# Patient Record
Sex: MALE | Race: BLACK OR AFRICAN AMERICAN | NOT HISPANIC OR LATINO | Employment: OTHER | RURAL
[De-identification: names, ages, dates, MRNs, and addresses within clinical notes are randomized per-mention and may not be internally consistent; named-entity substitution may affect disease eponyms.]

---

## 2020-10-28 ENCOUNTER — HISTORICAL (OUTPATIENT)
Dept: ADMINISTRATIVE | Facility: HOSPITAL | Age: 30
End: 2020-10-28

## 2020-10-28 LAB
25(OH)D3 SERPL-MCNC: 13.3 NG/ML
ALBUMIN SERPL BCP-MCNC: 3.3 G/DL (ref 3.5–5)
ALBUMIN/GLOB SERPL: 0.8 {RATIO}
ALP SERPL-CCNC: 102 U/L (ref 45–115)
ALT SERPL W P-5'-P-CCNC: 30 U/L (ref 16–61)
ANION GAP SERPL CALCULATED.3IONS-SCNC: 10.6 MMOL/L (ref 7–16)
AST SERPL W P-5'-P-CCNC: 24 U/L (ref 15–37)
BASOPHILS # BLD AUTO: 0.06 X10E3/UL (ref 0–0.2)
BASOPHILS NFR BLD AUTO: 1.2 % (ref 0–1)
BILIRUB SERPL-MCNC: 0.4 MG/DL (ref 0–1.2)
BUN SERPL-MCNC: 8 MG/DL (ref 7–18)
BUN/CREAT SERPL: 8
CALCIUM SERPL-MCNC: 8.9 MG/DL (ref 8.5–10.1)
CHLORIDE SERPL-SCNC: 107 MMOL/L (ref 98–107)
CHOLEST SERPL-MCNC: 145 MG/DL
CHOLEST/HDLC SERPL: 3.6 {RATIO}
CO2 SERPL-SCNC: 26 MMOL/L (ref 21–32)
CREAT SERPL-MCNC: 0.97 MG/DL (ref 0.7–1.3)
EOSINOPHIL # BLD AUTO: 0.11 X10E3/UL (ref 0–0.5)
EOSINOPHIL NFR BLD AUTO: 2.2 % (ref 1–4)
ERYTHROCYTE [DISTWIDTH] IN BLOOD BY AUTOMATED COUNT: 14.5 % (ref 11.5–14.5)
EST. AVERAGE GLUCOSE BLD GHB EST-MCNC: 130 MG/DL
GLOBULIN SER-MCNC: 4.3 G/DL (ref 2–4)
GLUCOSE SERPL-MCNC: 96 MG/DL (ref 74–106)
HBA1C MFR BLD HPLC: 6.5 %
HCT VFR BLD AUTO: 38.9 % (ref 40–54)
HDLC SERPL-MCNC: 40 MG/DL
HGB BLD-MCNC: 12.9 G/DL (ref 13.5–18)
IMM GRANULOCYTES # BLD AUTO: 0.03 X10E3/UL (ref 0–0.04)
IMM GRANULOCYTES NFR BLD: 0.6 % (ref 0–0.4)
LDLC SERPL CALC-MCNC: 89 MG/DL
LYMPHOCYTES # BLD AUTO: 1.3 X10E3/UL (ref 1–4.8)
LYMPHOCYTES NFR BLD AUTO: 25.4 % (ref 27–41)
MCH RBC QN AUTO: 32.6 PG (ref 27–31)
MCHC RBC AUTO-ENTMCNC: 33.2 G/DL (ref 32–36)
MCV RBC AUTO: 98.2 FL (ref 80–96)
MONOCYTES # BLD AUTO: 0.43 X10E3/UL (ref 0–0.8)
MONOCYTES NFR BLD AUTO: 8.4 % (ref 2–6)
MPC BLD CALC-MCNC: 11 FL (ref 9.4–12.4)
NEUTROPHILS # BLD AUTO: 3.18 X10E3/UL (ref 1.8–7.7)
NEUTROPHILS NFR BLD AUTO: 62.2 % (ref 53–65)
NRBC # BLD AUTO: 0 X10E3/UL (ref 0–0)
NRBC, AUTO (.00): 0 /100 (ref 0–0)
PLATELET # BLD AUTO: 338 X10E3/UL (ref 150–400)
POTASSIUM SERPL-SCNC: 3.6 MMOL/L (ref 3.5–5.1)
PROT SERPL-MCNC: 7.6 G/DL (ref 6.4–8.2)
RBC # BLD AUTO: 3.96 X10E6/UL (ref 4.6–6.2)
SODIUM SERPL-SCNC: 140 MMOL/L (ref 136–145)
TRIGL SERPL-MCNC: 82 MG/DL
TSH SERPL DL<=0.005 MIU/L-ACNC: 2.21 UIU/ML (ref 0.36–3.74)
WBC # BLD AUTO: 5.11 X10E3/UL (ref 4.5–11)

## 2023-11-09 ENCOUNTER — HOSPITAL ENCOUNTER (INPATIENT)
Facility: HOSPITAL | Age: 33
LOS: 83 days | Discharge: HOME HEALTH | DRG: 189 | End: 2024-01-31
Attending: INTERNAL MEDICINE | Admitting: INTERNAL MEDICINE
Payer: MEDICARE

## 2023-11-09 DIAGNOSIS — J96.02 ACUTE HYPERCAPNIC RESPIRATORY FAILURE: Primary | ICD-10-CM

## 2023-11-09 DIAGNOSIS — J96.90 RESPIRATORY FAILURE: ICD-10-CM

## 2023-11-09 LAB
ALBUMIN SERPL BCP-MCNC: 1.9 G/DL (ref 3.5–5)
ALBUMIN/GLOB SERPL: 0.4 {RATIO}
ALP SERPL-CCNC: 141 U/L (ref 45–115)
ALT SERPL W P-5'-P-CCNC: 20 U/L (ref 16–61)
ANION GAP SERPL CALCULATED.3IONS-SCNC: 12 MMOL/L (ref 7–16)
ANISOCYTOSIS BLD QL SMEAR: ABNORMAL
AST SERPL W P-5'-P-CCNC: 20 U/L (ref 15–37)
BASOPHILS # BLD AUTO: 0.04 K/UL (ref 0–0.2)
BASOPHILS NFR BLD AUTO: 0.4 % (ref 0–1)
BILIRUB SERPL-MCNC: 0.3 MG/DL (ref ?–1.2)
BILIRUB UR QL STRIP: NEGATIVE
BUN SERPL-MCNC: 12 MG/DL (ref 7–18)
BUN/CREAT SERPL: 19 (ref 6–20)
CALCIUM SERPL-MCNC: 9 MG/DL (ref 8.5–10.1)
CHLORIDE SERPL-SCNC: 97 MMOL/L (ref 98–107)
CLARITY UR: CLEAR
CO2 SERPL-SCNC: 27 MMOL/L (ref 21–32)
COLOR UR: YELLOW
CREAT SERPL-MCNC: 0.62 MG/DL (ref 0.7–1.3)
DIFFERENTIAL METHOD BLD: ABNORMAL
EGFR (NO RACE VARIABLE) (RUSH/TITUS): 131 ML/MIN/1.73M2
EOSINOPHIL # BLD AUTO: 0.25 K/UL (ref 0–0.5)
EOSINOPHIL NFR BLD AUTO: 2.3 % (ref 1–4)
EOSINOPHIL NFR BLD MANUAL: 2 % (ref 1–4)
ERYTHROCYTE [DISTWIDTH] IN BLOOD BY AUTOMATED COUNT: 16.5 % (ref 11.5–14.5)
GLOBULIN SER-MCNC: 5.2 G/DL (ref 2–4)
GLUCOSE SERPL-MCNC: 159 MG/DL (ref 74–106)
GLUCOSE UR STRIP-MCNC: NEGATIVE MG/DL
HCO3 UR-SCNC: 28.9 MMOL/L (ref 21–28)
HCT VFR BLD AUTO: 29.6 % (ref 40–54)
HGB BLD-MCNC: 8.7 G/DL (ref 13.5–18)
HYPOCHROMIA BLD QL SMEAR: ABNORMAL
KETONES UR STRIP-SCNC: NEGATIVE MG/DL
LEUKOCYTE ESTERASE UR QL STRIP: NEGATIVE
LEVETIRACETAM SERPL-MCNC: 16.7 ΜG/ML (ref 12–46)
LYMPHOCYTES # BLD AUTO: 1.36 K/UL (ref 1–4.8)
LYMPHOCYTES NFR BLD AUTO: 12.3 % (ref 27–41)
LYMPHOCYTES NFR BLD MANUAL: 19 % (ref 27–41)
MAGNESIUM SERPL-MCNC: 1.9 MG/DL (ref 1.7–2.3)
MCH RBC QN AUTO: 27.4 PG (ref 27–31)
MCHC RBC AUTO-ENTMCNC: 29.4 G/DL (ref 32–36)
MCV RBC AUTO: 93.1 FL (ref 80–96)
MONOCYTES # BLD AUTO: 0.74 K/UL (ref 0–0.8)
MONOCYTES NFR BLD AUTO: 6.7 % (ref 2–6)
MONOCYTES NFR BLD MANUAL: 9 % (ref 2–6)
MPC BLD CALC-MCNC: 9.9 FL (ref 9.4–12.4)
NEUTROPHILS # BLD AUTO: 8.64 K/UL (ref 1.8–7.7)
NEUTROPHILS NFR BLD AUTO: 78.3 % (ref 53–65)
NEUTS BAND NFR BLD MANUAL: 3 % (ref 1–5)
NEUTS SEG NFR BLD MANUAL: 67 % (ref 50–62)
NITRITE UR QL STRIP: NEGATIVE
NRBC BLD MANUAL-RTO: ABNORMAL %
PCO2 BLDA: 37 MMHG (ref 35–48)
PH SMN: 7.5 [PH] (ref 7.35–7.45)
PH UR STRIP: 6 PH UNITS
PLATELET # BLD AUTO: 614 K/UL (ref 150–400)
PO2 BLDA: 86 MMHG (ref 83–108)
POC BASE EXCESS: 5.5 MMOL/L (ref -2–3)
POC SATURATED O2: 97 %
POIKILOCYTOSIS BLD QL SMEAR: ABNORMAL
POTASSIUM SERPL-SCNC: 4 MMOL/L (ref 3.5–5.1)
PREALB SERPL NEPH-MCNC: 20 MG/DL (ref 20–40)
PROT SERPL-MCNC: 7.1 G/DL (ref 6.4–8.2)
PROT UR QL STRIP: NEGATIVE
RBC # BLD AUTO: 3.18 M/UL (ref 4.6–6.2)
RBC # UR STRIP: NEGATIVE /UL
SODIUM SERPL-SCNC: 132 MMOL/L (ref 136–145)
SP GR UR STRIP: 1.02
UROBILINOGEN UR STRIP-ACNC: 0.2 MG/DL
WBC # BLD AUTO: 11.03 K/UL (ref 4.5–11)

## 2023-11-09 PROCEDURE — 27100108

## 2023-11-09 PROCEDURE — 25000003 PHARM REV CODE 250: Performed by: REGISTERED NURSE

## 2023-11-09 PROCEDURE — 84134 ASSAY OF PREALBUMIN: CPT | Performed by: NURSE PRACTITIONER

## 2023-11-09 PROCEDURE — 81003 URINALYSIS AUTO W/O SCOPE: CPT | Performed by: NURSE PRACTITIONER

## 2023-11-09 PROCEDURE — 82803 BLOOD GASES ANY COMBINATION: CPT

## 2023-11-09 PROCEDURE — 99900035 HC TECH TIME PER 15 MIN (STAT)

## 2023-11-09 PROCEDURE — 11000004 HC SNF PRIVATE

## 2023-11-09 PROCEDURE — 63600175 PHARM REV CODE 636 W HCPCS: Performed by: REGISTERED NURSE

## 2023-11-09 PROCEDURE — 27000221 HC OXYGEN, UP TO 24 HOURS

## 2023-11-09 PROCEDURE — 87186 SC STD MICRODIL/AGAR DIL: CPT | Performed by: INTERNAL MEDICINE

## 2023-11-09 PROCEDURE — 80053 COMPREHEN METABOLIC PANEL: CPT | Performed by: NURSE PRACTITIONER

## 2023-11-09 PROCEDURE — 93010 ELECTROCARDIOGRAM REPORT: CPT | Mod: ,,, | Performed by: INTERNAL MEDICINE

## 2023-11-09 PROCEDURE — 80177 DRUG SCRN QUAN LEVETIRACETAM: CPT | Performed by: NURSE PRACTITIONER

## 2023-11-09 PROCEDURE — 99900031 HC PATIENT EDUCATION (STAT)

## 2023-11-09 PROCEDURE — 94761 N-INVAS EAR/PLS OXIMETRY MLT: CPT

## 2023-11-09 PROCEDURE — 94002 VENT MGMT INPAT INIT DAY: CPT

## 2023-11-09 PROCEDURE — 27200966 HC CLOSED SUCTION SYSTEM

## 2023-11-09 PROCEDURE — 85025 COMPLETE CBC W/AUTO DIFF WBC: CPT | Performed by: NURSE PRACTITIONER

## 2023-11-09 PROCEDURE — 99900026 HC AIRWAY MAINTENANCE (STAT)

## 2023-11-09 PROCEDURE — 93005 ELECTROCARDIOGRAM TRACING: CPT

## 2023-11-09 PROCEDURE — 83036 HEMOGLOBIN GLYCOSYLATED A1C: CPT | Performed by: REGISTERED NURSE

## 2023-11-09 PROCEDURE — 36600 WITHDRAWAL OF ARTERIAL BLOOD: CPT

## 2023-11-09 PROCEDURE — 83735 ASSAY OF MAGNESIUM: CPT | Performed by: NURSE PRACTITIONER

## 2023-11-09 RX ORDER — PANTOPRAZOLE SODIUM 40 MG/1
40 FOR SUSPENSION ORAL DAILY
Status: DISCONTINUED | OUTPATIENT
Start: 2023-11-10 | End: 2023-11-12

## 2023-11-09 RX ORDER — IPRATROPIUM BROMIDE AND ALBUTEROL SULFATE 2.5; .5 MG/3ML; MG/3ML
3 SOLUTION RESPIRATORY (INHALATION) EVERY 6 HOURS
Status: ON HOLD | COMMUNITY
End: 2024-01-30

## 2023-11-09 RX ORDER — METOPROLOL TARTRATE 25 MG/1
25 TABLET, FILM COATED ORAL 2 TIMES DAILY
Status: ON HOLD | COMMUNITY
End: 2024-01-30

## 2023-11-09 RX ORDER — ALBUTEROL SULFATE 0.83 MG/ML
2.5 SOLUTION RESPIRATORY (INHALATION) EVERY 6 HOURS PRN
Status: DISCONTINUED | OUTPATIENT
Start: 2023-11-09 | End: 2024-01-31 | Stop reason: HOSPADM

## 2023-11-09 RX ORDER — LEVETIRACETAM 100 MG/ML
500 SOLUTION ORAL 2 TIMES DAILY
Status: DISCONTINUED | OUTPATIENT
Start: 2023-11-09 | End: 2024-01-31 | Stop reason: HOSPADM

## 2023-11-09 RX ORDER — PANTOPRAZOLE SODIUM 40 MG/1
40 FOR SUSPENSION ORAL DAILY
COMMUNITY

## 2023-11-09 RX ORDER — LEVETIRACETAM 100 MG/ML
500 SOLUTION ORAL 2 TIMES DAILY
Status: ON HOLD | COMMUNITY
End: 2024-01-30

## 2023-11-09 RX ORDER — ALBUTEROL SULFATE 0.83 MG/ML
2.5 SOLUTION RESPIRATORY (INHALATION) EVERY 12 HOURS
Status: DISCONTINUED | OUTPATIENT
Start: 2023-11-10 | End: 2023-11-09

## 2023-11-09 RX ORDER — LORAZEPAM 2 MG/ML
1 INJECTION INTRAMUSCULAR DAILY PRN
Status: DISCONTINUED | OUTPATIENT
Start: 2023-11-09 | End: 2024-01-31 | Stop reason: HOSPADM

## 2023-11-09 RX ORDER — IPRATROPIUM BROMIDE AND ALBUTEROL SULFATE 2.5; .5 MG/3ML; MG/3ML
3 SOLUTION RESPIRATORY (INHALATION) EVERY 12 HOURS
Status: DISCONTINUED | OUTPATIENT
Start: 2023-11-10 | End: 2024-01-31 | Stop reason: HOSPADM

## 2023-11-09 RX ORDER — IPRATROPIUM BROMIDE AND ALBUTEROL SULFATE 2.5; .5 MG/3ML; MG/3ML
3 SOLUTION RESPIRATORY (INHALATION) EVERY 6 HOURS
Status: DISCONTINUED | OUTPATIENT
Start: 2023-11-10 | End: 2023-11-09

## 2023-11-09 RX ORDER — IPRATROPIUM BROMIDE 0.5 MG/2.5ML
0.5 SOLUTION RESPIRATORY (INHALATION) EVERY 12 HOURS
Status: DISCONTINUED | OUTPATIENT
Start: 2023-11-10 | End: 2023-11-09

## 2023-11-09 RX ORDER — METOPROLOL TARTRATE 25 MG/1
25 TABLET, FILM COATED ORAL 2 TIMES DAILY
Status: DISCONTINUED | OUTPATIENT
Start: 2023-11-09 | End: 2023-11-12

## 2023-11-09 RX ADMIN — LEVETIRACETAM 500 MG: 500 SOLUTION ORAL at 09:11

## 2023-11-09 RX ADMIN — LORAZEPAM 1 MG: 2 INJECTION INTRAMUSCULAR; INTRAVENOUS at 10:11

## 2023-11-09 RX ADMIN — METOPROLOL TARTRATE 25 MG: 25 TABLET, FILM COATED ORAL at 09:11

## 2023-11-09 RX ADMIN — APIXABAN 5 MG: 2.5 TABLET, FILM COATED ORAL at 09:11

## 2023-11-09 NOTE — RESPIRATORY THERAPY
Patient arrived at 13:20 and was put on the following settings: SIMV, , Rate of 8 FIO2 40%, PEEP of 5 and Pressure Support of 12

## 2023-11-09 NOTE — PLAN OF CARE
Problem: Adult Inpatient Plan of Care  Goal: Plan of Care Review  Outcome: Ongoing, Progressing  Goal: Patient-Specific Goal (Individualized)  Outcome: Ongoing, Progressing     Problem: Device-Related Complication Risk (Mechanical Ventilation, Invasive)  Goal: Optimal Device Function  Outcome: Ongoing, Progressing     Problem: Nutrition Impairment (Mechanical Ventilation, Invasive)  Goal: Optimal Nutrition Delivery  Outcome: Ongoing, Progressing     Problem: Skin and Tissue Injury (Mechanical Ventilation, Invasive)  Goal: Absence of Device-Related Skin and Tissue Injury  Outcome: Ongoing, Progressing     Problem: Device-Related Complication Risk (Artificial Airway)  Goal: Optimal Device Function  Outcome: Ongoing, Progressing     Problem: Skin and Tissue Injury (Artificial Airway)  Goal: Absence of Device-Related Skin or Tissue Injury  Outcome: Ongoing, Progressing     Problem: Fall Injury Risk  Goal: Absence of Fall and Fall-Related Injury  Outcome: Ongoing, Progressing     Problem: Skin Injury Risk Increased  Goal: Skin Health and Integrity  Outcome: Ongoing, Progressing     Problem: Impaired Wound Healing  Goal: Optimal Wound Healing  Outcome: Ongoing, Progressing

## 2023-11-10 PROBLEM — M62.81 MUSCLE WEAKNESS: Status: ACTIVE | Noted: 2023-11-10

## 2023-11-10 LAB
EST. AVERAGE GLUCOSE BLD GHB EST-MCNC: 137 MG/DL
GLUCOSE SERPL-MCNC: 144 MG/DL (ref 70–105)
HBA1C MFR BLD HPLC: 6.4 % (ref 4.5–6.6)

## 2023-11-10 PROCEDURE — 94003 VENT MGMT INPAT SUBQ DAY: CPT

## 2023-11-10 PROCEDURE — 25000242 PHARM REV CODE 250 ALT 637 W/ HCPCS: Performed by: INTERNAL MEDICINE

## 2023-11-10 PROCEDURE — 99305 1ST NF CARE MODERATE MDM 35: CPT | Mod: 95,AI,, | Performed by: INTERNAL MEDICINE

## 2023-11-10 PROCEDURE — 94761 N-INVAS EAR/PLS OXIMETRY MLT: CPT

## 2023-11-10 PROCEDURE — 25000003 PHARM REV CODE 250: Performed by: REGISTERED NURSE

## 2023-11-10 PROCEDURE — 27200966 HC CLOSED SUCTION SYSTEM

## 2023-11-10 PROCEDURE — 94640 AIRWAY INHALATION TREATMENT: CPT

## 2023-11-10 PROCEDURE — 99900035 HC TECH TIME PER 15 MIN (STAT)

## 2023-11-10 PROCEDURE — 97167 OT EVAL HIGH COMPLEX 60 MIN: CPT

## 2023-11-10 PROCEDURE — 82962 GLUCOSE BLOOD TEST: CPT

## 2023-11-10 PROCEDURE — 97162 PT EVAL MOD COMPLEX 30 MIN: CPT

## 2023-11-10 PROCEDURE — 27000221 HC OXYGEN, UP TO 24 HOURS

## 2023-11-10 PROCEDURE — 99900026 HC AIRWAY MAINTENANCE (STAT)

## 2023-11-10 PROCEDURE — 25000242 PHARM REV CODE 250 ALT 637 W/ HCPCS: Performed by: REGISTERED NURSE

## 2023-11-10 PROCEDURE — 11000004 HC SNF PRIVATE

## 2023-11-10 RX ADMIN — METOPROLOL TARTRATE 25 MG: 25 TABLET, FILM COATED ORAL at 09:11

## 2023-11-10 RX ADMIN — IPRATROPIUM BROMIDE AND ALBUTEROL SULFATE 3 ML: 2.5; .5 SOLUTION RESPIRATORY (INHALATION) at 07:11

## 2023-11-10 RX ADMIN — APIXABAN 5 MG: 2.5 TABLET, FILM COATED ORAL at 08:11

## 2023-11-10 RX ADMIN — LEVETIRACETAM 500 MG: 500 SOLUTION ORAL at 09:11

## 2023-11-10 RX ADMIN — LEVETIRACETAM 500 MG: 500 SOLUTION ORAL at 08:11

## 2023-11-10 RX ADMIN — PANTOPRAZOLE SODIUM 40 MG: 40 GRANULE, DELAYED RELEASE ORAL at 09:11

## 2023-11-10 RX ADMIN — METOPROLOL TARTRATE 25 MG: 25 TABLET, FILM COATED ORAL at 08:11

## 2023-11-10 RX ADMIN — APIXABAN 5 MG: 2.5 TABLET, FILM COATED ORAL at 09:11

## 2023-11-10 NOTE — SUBJECTIVE & OBJECTIVE
Past Medical History:   Diagnosis Date    Diabetes mellitus     Digestive disorder     Seizures     Sleep apnea, unspecified        History reviewed. No pertinent surgical history.    Review of patient's allergies indicates:  Not on File    No current facility-administered medications on file prior to encounter.     Current Outpatient Medications on File Prior to Encounter   Medication Sig    albuterol-ipratropium (DUO-NEB) 2.5 mg-0.5 mg/3 mL nebulizer solution Take 3 mLs by nebulization every 6 (six) hours. Rescue    apixaban (ELIQUIS) 5 mg Tab 5 mg by Per G Tube route 2 (two) times daily.    levETIRAcetam (KEPPRA) 100 mg/mL Soln 500 mg by Per G Tube route 2 (two) times daily.    metoprolol tartrate (LOPRESSOR) 25 MG tablet 25 mg by Per G Tube route 2 (two) times daily.    pantoprazole (PROTONIX) 40 mg suspension 40 mg by Per G Tube route once daily.     Family History       Problem Relation (Age of Onset)    ADD / ADHD Sister    Hypertension Mother          Tobacco Use    Smoking status: Never    Smokeless tobacco: Never   Substance and Sexual Activity    Alcohol use: Never    Drug use: Never    Sexual activity: Never     Review of Systems   Unable to perform ROS: Acuity of condition     Objective:     Vital Signs (Most Recent):  Temp: (!) 100.9 °F (38.3 °C) (11/10/23 0737)  Pulse: (!) 113 (11/10/23 0745)  Resp: (!) 31 (11/10/23 0745)  BP: (!) 155/70 (11/10/23 0737)  SpO2: 100 % (11/10/23 0745) Vital Signs (24h Range):  Temp:  [98.7 °F (37.1 °C)-100.9 °F (38.3 °C)] 100.9 °F (38.3 °C)  Pulse:  [] 113  Resp:  [17-45] 31  SpO2:  [91 %-100 %] 100 %  BP: (100-155)/(52-70) 155/70     Weight: 89.5 kg (197 lb 4.8 oz)  Body mass index is 39.85 kg/m².     Physical Exam  Vitals reviewed.   Constitutional:       Appearance: Normal appearance.      Interventions: He is not intubated.  HENT:      Head: Normocephalic and atraumatic.      Nose: Nose normal.      Mouth/Throat:      Mouth: Mucous membranes are dry.       Pharynx: Oropharynx is clear.   Eyes:      Extraocular Movements: Extraocular movements intact.      Conjunctiva/sclera: Conjunctivae normal.      Pupils: Pupils are equal, round, and reactive to light.   Cardiovascular:      Rate and Rhythm: Normal rate.      Heart sounds: Normal heart sounds. No murmur heard.  Pulmonary:      Effort: Pulmonary effort is normal. He is not intubated.      Breath sounds: Normal breath sounds.   Abdominal:      General: Abdomen is flat. Bowel sounds are normal.      Palpations: Abdomen is soft.   Musculoskeletal:         General: Normal range of motion.      Cervical back: Normal range of motion and neck supple.      Right lower leg: No edema.      Left lower leg: No edema.   Skin:     General: Skin is warm and dry.      Capillary Refill: Capillary refill takes less than 2 seconds.   Neurological:      General: No focal deficit present.      Mental Status: He is alert and oriented to person, place, and time.   Psychiatric:         Mood and Affect: Mood normal.         Behavior: Behavior normal.              CRANIAL NERVES     CN III, IV, VI   Pupils are equal, round, and reactive to light.       Significant Labs: All pertinent labs within the past 24 hours have been reviewed.  Recent Lab Results         11/10/23  0524   11/09/23  1811   11/09/23  1700   11/09/23  1513        Albumin/Globulin Ratio     0.4         Albumin     1.9         ALP     141         ALT     20         Anion Gap     12         Aniso     1+         Appearance, UA   Clear           AST     20         Bands     3         Baso #     0.04         Basophil %     0.4         Bilirubin (UA)   Negative           BILIRUBIN TOTAL     0.3         BUN     12         BUN/CREAT RATIO     19         Calcium     9.0         Chloride     97         CO2     27         Color, UA   Yellow           Creatinine     0.62         Differential Method     Manual         eGFR     131         Eos #     0.25         Eosinophil %     2.3               2         Globulin, Total     5.2         Glucose     159         Glucose, UA   Negative           Hematocrit     29.6         Hemoglobin     8.7         Hypo     Few         Ketones, UA   Negative           Leukocytes, UA   Negative           Levetiracetam Lvl     16.7         Lymph #     1.36         Lymph %     12.3              19         Magnesium      1.9         MCH     27.4         MCHC     29.4         MCV     93.1         Mono #     0.74         Mono %     6.7              9         MPV     9.9         Neutrophils, Abs     8.64         Neutrophils Relative     78.3         NITRITE UA   Negative           nRBC             Occult Blood UA   Negative           pH, UA   6.0           Platelet Count     614         POC Base Excess       5.5       POC Glucose 144             POC HCO3       28.9       POC PCO2       37       POC PH       7.50       POC PO2       86       POC SATURATED O2       97       Poikilocytosis     1+         Potassium     4.0         Prealbumin     20         PROTEIN TOTAL     7.1         Protein, UA   Negative           RBC     3.18         RDW     16.5         Segmented Neutrophils, Man %     67         Sodium     132         Specific Gravity, UA   1.020           UROBILINOGEN UA   0.2           WBC     11.03                 Significant Imaging: I have reviewed all pertinent imaging results/findings within the past 24 hours.

## 2023-11-10 NOTE — RESPIRATORY THERAPY
10:11   Put patient on AVAPS heart rate and respirations were a little elevated.  Heart rate was 101 and Respirations were 24.  Patient was resting comfortably.      11:55  Took patient off of AVAPS and put him back on SIMV, Respirations were up toward the end, but SAT was 96%. After a few minutes on SIMV patients respirations went down into the 20's    14:06 Put patient on AVAPS, vital signs were good    15:12  Took patient off of AVAPS and put patient back on SIMV.  Patient had no problems during this trial.

## 2023-11-10 NOTE — PLAN OF CARE
Problem: Communication Impairment (Mechanical Ventilation, Invasive)  Goal: Effective Communication  Outcome: Ongoing, Progressing     Problem: Device-Related Complication Risk (Mechanical Ventilation, Invasive)  Goal: Optimal Device Function  Outcome: Ongoing, Progressing     Problem: Inability to Wean (Mechanical Ventilation, Invasive)  Goal: Mechanical Ventilation Liberation  Outcome: Ongoing, Progressing     Problem: Nutrition Impairment (Mechanical Ventilation, Invasive)  Goal: Optimal Nutrition Delivery  Outcome: Ongoing, Progressing     Problem: Skin and Tissue Injury (Mechanical Ventilation, Invasive)  Goal: Absence of Device-Related Skin and Tissue Injury  Outcome: Ongoing, Progressing     Problem: Ventilator-Induced Lung Injury (Mechanical Ventilation, Invasive)  Goal: Absence of Ventilator-Induced Lung Injury  Outcome: Ongoing, Progressing     Problem: Communication Impairment (Artificial Airway)  Goal: Effective Communication  Outcome: Ongoing, Progressing     Problem: Device-Related Complication Risk (Artificial Airway)  Goal: Optimal Device Function  Outcome: Ongoing, Progressing     Problem: Skin and Tissue Injury (Artificial Airway)  Goal: Absence of Device-Related Skin or Tissue Injury  Outcome: Ongoing, Progressing     Problem: Noninvasive Ventilation Acute  Goal: Effective Unassisted Ventilation and Oxygenation  Outcome: Ongoing, Progressing

## 2023-11-10 NOTE — PLAN OF CARE
Problem: Occupational Therapy  Goal: Occupational Therapy Goal  Description: ST. Pt will visually track from L<>R sides   2. Patient will follow one step command for Active Range of Motion of extremities to allow active participation in therapeutic intervention.       LTG  to be established if patient is able to participate in therapy.   Outcome: Ongoing, Progressing

## 2023-11-10 NOTE — PLAN OF CARE
[facility] - Medical Surgical Unit  Initial Discharge Assessment       Primary Care Provider: Nati Doyle FNP    Admission Diagnosis: Respiratory failure [J96.90]    Admission Date: 11/9/2023  Expected Discharge Date:     Transition of Care Barriers: None    Payor: MEDICARE / Plan: MEDICARE PART A & B / Product Type: Government /     Extended Emergency Contact Information  Primary Emergency Contact: Ramon Ramirez  Mobile Phone: [phone]  Relation: Mother   needed? No    Discharge Plan A: Home with family, Home Health       No Pharmacies Listed    Initial Assessment (most recent)       Adult Discharge Assessment - 11/10/23 1000          Discharge Assessment    Assessment Type Discharge Planning Assessment     Confirmed/corrected address, phone number and insurance Yes     Confirmed Demographics Correct on Facesheet     Source of Information family     If unable to respond/provide information was family/caregiver contacted? Yes     Contact Name/Number Ramon Ramirez mother     Communicated CRICKET with patient/caregiver Date not available/Unable to determine     Reason For Admission vent weaning and decannulation     People in Home parent(s)     Facility Arrived From: ramon ramirez mother and step dad     Do you expect to return to your current living situation? Yes     Do you have help at home or someone to help you manage your care at home? Yes     Who are your caregiver(s) and their phone number(s)? mother and step father and sister and surrounding family     Current cognitive status: Unable to Assess     Do you have any problems with: Needs other help     Specify other help pt is disable and mother provides all necessities     Home Accessibility stairs to enter home;not wheelchair accessible     Number of Stairs, Main Entrance five     Surface of Stairs, Main Entrance carpeting     Landing, Stairs, Main Entrance adequate turning radius     Home Layout Able to live on 1st floor      Equipment Currently Used at Home nebulizer;CPAP     Readmission within 30 days? No     Patient currently being followed by outpatient case management? No     Do you currently have service(s) that help you manage your care at home? No     Do you take prescription medications? Yes     Do you have prescription coverage? Yes     Coverage medicaid     Do you have any problems affording any of your prescribed medications? No     Is the patient taking medications as prescribed? yes     Who is going to help you get home at discharge? mother Linn Valdez     How do you get to doctors appointments? family or friend will provide     Are you on dialysis? No     Do you take coumadin? No     DME Needed Upon Discharge  hospital bed     Discharge Plan discussed with: Caregiver     Name(s) and Number(s) Linn Valdez [phone]     Transition of Care Barriers None     Discharge Plan A Home with family;Home Health        Physical Activity    On average, how many days per week do you engage in moderate to strenuous exercise (like a brisk walk)? 0 days     On average, how many minutes do you engage in exercise at this level? 0 min        Financial Resource Strain    How hard is it for you to pay for the very basics like food, housing, medical care, and heating? Not hard at all        Housing Stability    In the last 12 months, was there a time when you were not able to pay the mortgage or rent on time? No     In the last 12 months, how many places have you lived? 1     In the last 12 months, was there a time when you did not have a steady place to sleep or slept in a shelter (including now)? No        Transportation Needs    In the past 12 months, has lack of transportation kept you from medical appointments or from getting medications? No     In the past 12 months, has lack of transportation kept you from meetings, work, or from getting things needed for daily living? No        Food Insecurity    Within the past 12 months, you  worried that your food would run out before you got the money to buy more. Never true     Within the past 12 months, the food you bought just didn't last and you didn't have money to get more. Never true        Stress    Do you feel stress - tense, restless, nervous, or anxious, or unable to sleep at night because your mind is troubled all the time - these days? Not at all        Social Connections    In a typical week, how many times do you talk on the phone with family, friends, or neighbors? More than three times a week     How often do you get together with friends or relatives? Twice a week     How often do you attend Yarsanism or Moravian services? More than 4 times per year     Do you belong to any clubs or organizations such as Yarsanism groups, unions, fraternal or athletic groups, or school groups? No     How often do you attend meetings of the clubs or organizations you belong to? Never     Are you , , , , never , or living with a partner? Never         Alcohol Use    Q1: How often do you have a drink containing alcohol? Never     Q2: How many drinks containing alcohol do you have on a typical day when you are drinking? Patient does not drink     Q3: How often do you have six or more drinks on one occasion? Never        OTHER    Name(s) of People in Home Ena Valdez and Mr Valdez                    ADMITTED TO [facility] VENT WEANING AND DECANNULATION , MOTHER PRESENT , PT LIVES WITH MOTHER ENA AND HIS STEP FATHER , WAS ABLE TO BATH SELF AND SERVE HIMSELF , MOTHER STILL HAD TO OBSERVE HIS CARE . DISCHARGE PLAN IS HOME WITH MOTHER AND WILL NEED DME AT DISCHARGE AND HOME HEALTH OF CHOICE.

## 2023-11-10 NOTE — ASSESSMENT & PLAN NOTE
Patient with Hypercapnic and Hypoxic Respiratory failure which is Chronic.  he is not on home oxygen. Supplemental oxygen was provided and noted- Vent Mode: SIMV  Oxygen Concentration (%):  [40] 40  Resp Rate Total:  [15 br/min-45 br/min] 27 br/min  Vt Set:  [450 mL] 450 mL  PEEP/CPAP:  [5 cmH20] 5 cmH20  Pressure Support:  [12 cmH20] 12 cmH20  Mean Airway Pressure:  [10.6 cmH20] 10.6 cmH20    .   Signs/symptoms of respiratory failure include- tachypnea, increased work of breathing, respiratory distress, use of accessory muscles, wheezing and stridor. Contributing diagnoses includes - Aspiration Labs and images were reviewed. Patient Has not had a recent ABG. Will treat underlying causes and adjust management of respiratory failure as follows- Vent weaning per protocol

## 2023-11-10 NOTE — PT/OT/SLP EVAL
Physical Therapy Evaluation    Patient Name:  Blue Abdul   MRN:  [id number]    Recommendations:     Discharge Recommendations: Low Intensity Therapy   Discharge Equipment Recommendations: hospital bed, lift device   Barriers to discharge: Inaccessible home and respiratory instability with vent dependence    Assessment:     Blue Abdul is a 33 y.o. male admitted with a medical diagnosis of Acute hypercapnic respiratory failure.  He presents with the following impairments/functional limitations: weakness, impaired self care skills, impaired functional mobility, impaired cognition, impaired cardiopulmonary response to activity Patient did not follow any commands throughout eval.  During gentle Active Range of Motion of Ue's and LE's his respiratory rate increased to 40 breaths per minute, his heartrate increased to 130 BPM, and his O2 sats dropped to 72%.  He was not appropriate to attempt any bed mobility or transfers.  I recommend one week trial to determine if he is able to actively participate in therapy safely. If no improvement will discharge to a ROM program for caregiver to perform.  His mother reported she was educated on a UE and LE ROM program at a previous hospital and she has been performing that but he does have some negative cardiovascular reactions when receiving Passive range of motion per her report.      Rehab Prognosis: Poor; patient would benefit from acute skilled PT services to address these deficits and reach maximum level of function.    Recent Surgery: * No surgery found *      Plan:     During this hospitalization, patient to be seen 5 x/week to address the identified rehab impairments via therapeutic exercises, therapeutic activities and progress toward the following goals:    Plan of Care Expires:  11/17/23    Subjective     Chief Complaint: vent dependence and lack of active following of commands  Patient/Family Comments/goals: return home with mother    Pain/Comfort:  Pain Rating 1:  (unresponsive)    Patients cultural, spiritual, Christian conflicts given the current situation: no    Living Environment:  Patient lived at home with mother prior to illness and hospitalization.  He was independent with mobility and used 3-4 words to communicate per his mother's report.    Prior to admission, patients level of function was independent with supervision due to Down Syndrome diagnosis.  Equipment used at home: none.  DME owned (not currently used): none.  Upon discharge, patient will have assistance from mother.    Objective:     Communicated with nurse prior to session.  Patient found supine with blood pressure cuff, rodriguez catheter, PEG Tube, peripheral IV, pulse ox (continuous), telemetry, ventilator, Tracheostomy  upon PT entry to room.    General Precautions: Standard,    Orthopedic Precautions:    Braces: N/A  Respiratory Status: Ventilator    Exams:  RLE ROM: WFL  RLE Strength: unable to follow any commands and did not demonstrate any Active Range of Motion in either of his LE's   LLE ROM: WFL  LLE Strength: unable to follow any commands and did not demonstrate any Active Range of Motion in either of his LE's    Functional Mobility:  Patient with very high heart rate and high respiration rate with decreasing O2 sats while on the ventilator during Passive range of motion and not stable enough to attempt any bed mobility or transfers.      AM-PAC 6 CLICK MOBILITY  Total Score:6       Treatment & Education:  Eval only.  Educated mother on PT Plan of care.     Patient left supine with call button in reach and mother present.    GOALS:   Multidisciplinary Problems       Physical Therapy Goals          Problem: Physical Therapy    Goal Priority Disciplines Outcome Goal Variances Interventions   Physical Therapy Goal     PT, PT/OT Ongoing, Not Progressing     Description: ST. Patient will follow one step command for Active Range of Motion of extremities to allow  active participation in therapeutic intervention.       LTG- to be established if patient is able to participate in therapy.                        History:     Past Medical History:   Diagnosis Date    Diabetes mellitus     Digestive disorder     Seizures     Sleep apnea, unspecified        History reviewed. No pertinent surgical history.    Time Tracking:     PT Received On: 11/10/23  PT Start Time: 0930     PT Stop Time: 0950  PT Total Time (min): 20 min     Billable Minutes: Evaluation 20      11/10/2023

## 2023-11-10 NOTE — PLAN OF CARE
Problem: Occupational Therapy  Goal: Occupational Therapy Goal  Description: ST. Pt will visually track from L<>R sides   2. Patient will follow one step command for Active Range of Motion of extremities to allow active participation in therapeutic intervention.       LTG  to be established if patient is able to participate in therapy.   11/10/2023 1305 by Alcides Jerez, OT  Outcome: Ongoing, Progressing  11/10/2023 1008 by Alcides Jerez, OT  Outcome: Ongoing, Progressing

## 2023-11-10 NOTE — CONSULTS
"Pt admitted to chronic vent unit with hx of hypoglycemic episode, aspiration. Resp failure with trach, cardiac arrest.  PMHx: DM, asthma, GERD, LILLIE, PEG, Seizure disorder.  Meds: Protonix, SS Insulin.  Ht: 59" Wt: 197# IBW: 100# %IBW: 197%, Adjusted BW: 121# EEN: 1577-7180 kcal, 55-66 g PRO, 1800-1980ml fluid.  Current TF: Glucerna 1.5@40ml per hour with 125ml water flush every 3 hours=1440 kcal, 79g PRO, 1728ml free water, 1960ml total fluids.  Pt has multiple HR areas to his skin.  BSS 10 noted.  Labs: Na 132, Cl 97, Alb 1.9, Hgb 8.7, Hct 29.6.  Last BM 11/10.  B-159.  Rec 1. Change flush to: 45ml water every hour.  This will provide: 2040ml free water, 1808ml free water.  F/U for further assessment upon return to facility.   "

## 2023-11-10 NOTE — PROGRESS NOTES
[facility] - [facility] Surgical Unit  Hospital Medicine  Progress Note    Patient Name: Blue Abdul  MRN: [id number]  Patient Class: IP- Swing   Admission Date: 11/9/2023  Length of Stay: 0 days  Attending Physician: Ramiro Flores MD  Primary Care Provider: Nati Doyle FNP        Subjective:     Principal Problem:<principal problem not specified>        HPI:  33 y-old AAM with PMH of Trisomy 21, asthma, DM, GERD, and LILLIE. Patient presented to an outside hospital system on 09/08/2023 for behavioral issues r/t medication changes. While at the hospital he experienced a hypoglycemic episode with aspiration and was coded. Patient developed new onset seizure at that time with MRI showing cerebral edema. Patient experienced difficulty with vent weaning hattie trach was placed on 09/27/2023, PEG tube placed on 10/05/2023. Patient is admitted to [facility] for continued vent weaning, PT/OT eval and treatment.       Overview/Hospital Course:  No notes on file    Past Medical History:   Diagnosis Date    Diabetes mellitus     Digestive disorder     Seizures     Sleep apnea, unspecified        History reviewed. No pertinent surgical history.    Review of patient's allergies indicates:  Not on File    No current facility-administered medications on file prior to encounter.     Current Outpatient Medications on File Prior to Encounter   Medication Sig    albuterol-ipratropium (DUO-NEB) 2.5 mg-0.5 mg/3 mL nebulizer solution Take 3 mLs by nebulization every 6 (six) hours. Rescue    apixaban (ELIQUIS) 5 mg Tab 5 mg by Per G Tube route 2 (two) times daily.    levETIRAcetam (KEPPRA) 100 mg/mL Soln 500 mg by Per G Tube route 2 (two) times daily.    metoprolol tartrate (LOPRESSOR) 25 MG tablet 25 mg by Per G Tube route 2 (two) times daily.    pantoprazole (PROTONIX) 40 mg suspension 40 mg by Per G Tube route once daily.     Family History       Problem Relation (Age of Onset)    ADD / ADHD  Sister    Hypertension Mother          Tobacco Use    Smoking status: Never    Smokeless tobacco: Never   Substance and Sexual Activity    Alcohol use: Never    Drug use: Never    Sexual activity: Never     Review of Systems   Constitutional:  Positive for activity change and fatigue.   HENT: Negative.     Eyes: Negative.    Respiratory:  Positive for cough.    Cardiovascular: Negative.    Gastrointestinal: Negative.    Endocrine: Negative.    Genitourinary: Negative.    Musculoskeletal: Negative.    Skin: Negative.    Allergic/Immunologic: Negative.    Neurological: Negative.    Hematological: Negative.    Psychiatric/Behavioral: Negative.       Objective:     Vital Signs (Most Recent):  Temp: 98.8 °F (37.1 °C) (11/09/23 1930)  Pulse: 88 (11/09/23 1930)  Resp: (!) 27 (11/09/23 1930)  BP: 123/69 (11/09/23 1930)  SpO2: 98 % (11/09/23 1930) Vital Signs (24h Range):  Temp:  [98.8 °F (37.1 °C)-99.6 °F (37.6 °C)] 98.8 °F (37.1 °C)  Pulse:  [] 88  Resp:  [27-45] 27  SpO2:  [94 %-99 %] 98 %  BP: (115-123)/(54-69) 123/69     Weight: 84.8 kg (187 lb)  Body mass index is 37.77 kg/m².     Physical Exam  Vitals and nursing note reviewed. Exam conducted with a chaperone present.   Constitutional:       Appearance: He is ill-appearing.   HENT:      Head: Normocephalic.      Right Ear: Ear canal and external ear normal.      Left Ear: Ear canal and external ear normal.      Nose: Nose normal.      Mouth/Throat:      Mouth: Mucous membranes are moist.      Pharynx: Oropharynx is clear.   Eyes:      Conjunctiva/sclera: Conjunctivae normal.   Cardiovascular:      Rate and Rhythm: Normal rate and regular rhythm.      Pulses: Normal pulses.      Heart sounds: Normal heart sounds.   Pulmonary:      Effort: Pulmonary effort is normal.      Breath sounds: Normal breath sounds.      Comments: Mechanically ventilated SIMV  Abdominal:      General: Bowel sounds are normal.   Skin:     General: Skin is warm and dry.      Capillary  Refill: Capillary refill takes 2 to 3 seconds.   Neurological:      Mental Status: Mental status is at baseline.      Comments: Patient does not follow commands and does not acknowledge the NP on initial exam.                 Significant Labs: All pertinent labs within the past 24 hours have been reviewed.  CBC:   Recent Labs   Lab 11/09/23  1700   WBC 11.03*   HGB 8.7*   HCT 29.6*   *     CMP:   Recent Labs   Lab 11/09/23  1700   *   K 4.0   CL 97*   CO2 27   *   BUN 12   CREATININE 0.62*   CALCIUM 9.0   PROT 7.1   ALBUMIN 1.9*   BILITOT 0.3   ALKPHOS 141*   AST 20   ALT 20   ANIONGAP 12     Urine Studies:   Recent Labs   Lab 11/09/23  1811   COLORU Yellow   APPEARANCEUA Clear   PHUR 6.0   SPECGRAV 1.020   PROTEINUA Negative   GLUCUA Negative   KETONESU Negative   BILIRUBINUA Negative   OCCULTUA Negative   NITRITE Negative   UROBILINOGEN 0.2   LEUKOCYTESUR Negative       Significant Imaging: I have reviewed all pertinent imaging results/findings within the past 24 hours.      Assessment/Plan:      Acute hypercapnic respiratory failure  Patient with Hypercapnic and Hypoxic Respiratory failure which is Chronic.  he is not on home oxygen. Supplemental oxygen was provided and noted- Vent Mode: SIMV  Oxygen Concentration (%):  [40] 40  Resp Rate Total:  [15 br/min-45 br/min] 27 br/min  Vt Set:  [450 mL] 450 mL  PEEP/CPAP:  [5 cmH20] 5 cmH20  Pressure Support:  [12 cmH20] 12 cmH20  Mean Airway Pressure:  [10.6 cmH20] 10.6 cmH20    .   Signs/symptoms of respiratory failure include- tachypnea, increased work of breathing, respiratory distress, use of accessory muscles, wheezing and stridor. Contributing diagnoses includes - Aspiration Labs and images were reviewed. Patient Has not had a recent ABG. Will treat underlying causes and adjust management of respiratory failure as follows- Vent weaning per protocol    Seizures  MRI showed encephalopathy  Keppra  Ativan PRN      Diabetes mellitus  Patient's  "FSGs are uncontrolled due to hyperglycemia on current medication regimen.  Last A1c reviewed-   Lab Results   Component Value Date    HGBA1C 6.5 10/28/2020     Most recent fingerstick glucose reviewed- No results for input(s): "POCTGLUCOSE" in the last 24 hours.  Current correctional scale  Low  Maintain anti-hyperglycemic dose as follows-   Antihyperglycemics (From admission, onward)    None        Hold Oral hypoglycemics while patient is in the hospital.    VTE Risk Mitigation (From admission, onward)         Ordered     apixaban tablet 5 mg  2 times daily         11/09/23 2008     IP VTE HIGH RISK PATIENT  Once         11/09/23 1641     Place NORMA hose  Until discontinued         11/09/23 1641     Place sequential compression device  Until discontinued         11/09/23 1641                Discharge Planning   CRICKET:      Code Status: Full Code   Is the patient medically ready for discharge?:     Reason for patient still in hospital (select all that apply): Patient unstable, Treatment and PT / OT recommendations                     Jose De Jesus Saravia NP-C  Department of Hospital Medicine   [facility]  "

## 2023-11-10 NOTE — H&P
[facility]  Hospital Medicine  History & Physical    Patient Name: Blue Abdul  MRN: [id number]  Patient Class: IP- Swing  Admission Date: 11/9/2023  Attending Physician: Ramiro Flores MD   Primary Care Provider: Nati Doyle FNP         Patient information was obtained from parent and past medical records.     Subjective:     Principal Problem:Acute hypercapnic respiratory failure    Chief Complaint: No chief complaint on file.       HPI: 33 y-old AAM with PMH of Trisomy 21, asthma, DM, GERD, and LILLIE. Patient presented to an outside hospital system on 09/08/2023 for behavioral issues r/t medication changes. While at the hospital he experienced a hypoglycemic episode with aspiration and was coded. Patient developed new onset seizure at that time with MRI showing cerebral edema. Patient experienced difficulty with vent weaning hattie trach was placed on 09/27/2023, PEG tube placed on 10/05/2023. Patient is admitted to [facility] for continued vent weaning, PT/OT eval and treatment.       Past Medical History:   Diagnosis Date    Diabetes mellitus     Digestive disorder     Seizures     Sleep apnea, unspecified        History reviewed. No pertinent surgical history.    Review of patient's allergies indicates:  Not on File    No current facility-administered medications on file prior to encounter.     Current Outpatient Medications on File Prior to Encounter   Medication Sig    albuterol-ipratropium (DUO-NEB) 2.5 mg-0.5 mg/3 mL nebulizer solution Take 3 mLs by nebulization every 6 (six) hours. Rescue    apixaban (ELIQUIS) 5 mg Tab 5 mg by Per G Tube route 2 (two) times daily.    levETIRAcetam (KEPPRA) 100 mg/mL Soln 500 mg by Per G Tube route 2 (two) times daily.    metoprolol tartrate (LOPRESSOR) 25 MG tablet 25 mg by Per G Tube route 2 (two) times daily.    pantoprazole (PROTONIX) 40 mg suspension 40 mg by Per G Tube route once daily.     Family  History       Problem Relation (Age of Onset)    ADD / ADHD Sister    Hypertension Mother          Tobacco Use    Smoking status: Never    Smokeless tobacco: Never   Substance and Sexual Activity    Alcohol use: Never    Drug use: Never    Sexual activity: Never     Review of Systems   Unable to perform ROS: Acuity of condition     Objective:     Vital Signs (Most Recent):  Temp: (!) 100.9 °F (38.3 °C) (11/10/23 0737)  Pulse: (!) 113 (11/10/23 0745)  Resp: (!) 31 (11/10/23 0745)  BP: (!) 155/70 (11/10/23 0737)  SpO2: 100 % (11/10/23 0745) Vital Signs (24h Range):  Temp:  [98.7 °F (37.1 °C)-100.9 °F (38.3 °C)] 100.9 °F (38.3 °C)  Pulse:  [] 113  Resp:  [17-45] 31  SpO2:  [91 %-100 %] 100 %  BP: (100-155)/(52-70) 155/70     Weight: 89.5 kg (197 lb 4.8 oz)  Body mass index is 39.85 kg/m².     Physical Exam  Vitals reviewed.   Constitutional:       Appearance: Normal appearance.      Interventions: He is not intubated.  HENT:      Head: Normocephalic and atraumatic.      Nose: Nose normal.      Mouth/Throat:      Mouth: Mucous membranes are dry.      Pharynx: Oropharynx is clear.   Eyes:      Extraocular Movements: Extraocular movements intact.      Conjunctiva/sclera: Conjunctivae normal.      Pupils: Pupils are equal, round, and reactive to light.   Cardiovascular:      Rate and Rhythm: Normal rate.      Heart sounds: Normal heart sounds. No murmur heard.  Pulmonary:      Effort: Pulmonary effort is normal. He is not intubated.      Breath sounds: Normal breath sounds.   Abdominal:      General: Abdomen is flat. Bowel sounds are normal.      Palpations: Abdomen is soft.   Musculoskeletal:         General: Normal range of motion.      Cervical back: Normal range of motion and neck supple.      Right lower leg: No edema.      Left lower leg: No edema.   Skin:     General: Skin is warm and dry.      Capillary Refill: Capillary refill takes less than 2 seconds.   Neurological:      General: No focal deficit  present.      Mental Status: He is alert and oriented to person, place, and time.   Psychiatric:         Mood and Affect: Mood normal.         Behavior: Behavior normal.              CRANIAL NERVES     CN III, IV, VI   Pupils are equal, round, and reactive to light.       Significant Labs: All pertinent labs within the past 24 hours have been reviewed.  Recent Lab Results         11/10/23  0524   11/09/23  1811   11/09/23  1700   11/09/23  1513        Albumin/Globulin Ratio     0.4         Albumin     1.9         ALP     141         ALT     20         Anion Gap     12         Aniso     1+         Appearance, UA   Clear           AST     20         Bands     3         Baso #     0.04         Basophil %     0.4         Bilirubin (UA)   Negative           BILIRUBIN TOTAL     0.3         BUN     12         BUN/CREAT RATIO     19         Calcium     9.0         Chloride     97         CO2     27         Color, UA   Yellow           Creatinine     0.62         Differential Method     Manual         eGFR     131         Eos #     0.25         Eosinophil %     2.3              2         Globulin, Total     5.2         Glucose     159         Glucose, UA   Negative           Hematocrit     29.6         Hemoglobin     8.7         Hypo     Few         Ketones, UA   Negative           Leukocytes, UA   Negative           Levetiracetam Lvl     16.7         Lymph #     1.36         Lymph %     12.3              19         Magnesium      1.9         MCH     27.4         MCHC     29.4         MCV     93.1         Mono #     0.74         Mono %     6.7              9         MPV     9.9         Neutrophils, Abs     8.64         Neutrophils Relative     78.3         NITRITE UA   Negative           nRBC             Occult Blood UA   Negative           pH, UA   6.0           Platelet Count     614         POC Base Excess       5.5       POC Glucose 144             POC HCO3       28.9       POC PCO2       37       POC PH       7.50        "POC PO2       86       POC SATURATED O2       97       Poikilocytosis     1+         Potassium     4.0         Prealbumin     20         PROTEIN TOTAL     7.1         Protein, UA   Negative           RBC     3.18         RDW     16.5         Segmented Neutrophils, Man %     67         Sodium     132         Specific Gravity, UA   1.020           UROBILINOGEN UA   0.2           WBC     11.03                 Significant Imaging: I have reviewed all pertinent imaging results/findings within the past 24 hours.    Assessment/Plan:     * Acute hypercapnic respiratory failure  Patient with prolonged mechanical ventilation will begin AVAPS weaning trials today    Muscle weakness  Start PT and OT      Seizures  MRI showed encephalopathy  Keppra  Ativan PRN      Diabetes mellitus  Patient's FSGs are uncontrolled due to hyperglycemia on current medication regimen.  Last A1c reviewed-   Lab Results   Component Value Date    HGBA1C 6.5 10/28/2020     Most recent fingerstick glucose reviewed- No results for input(s): "POCTGLUCOSE" in the last 24 hours.  Current correctional scale  Low  Maintain anti-hyperglycemic dose as follows-   Antihyperglycemics (From admission, onward)    None        Hold Oral hypoglycemics while patient is in the hospital.      VTE Risk Mitigation (From admission, onward)         Ordered     apixaban tablet 5 mg  2 times daily         11/09/23 2008     IP VTE HIGH RISK PATIENT  Once         11/09/23 1641     Place NORMA hose  Until discontinued         11/09/23 1641     Place sequential compression device  Until discontinued         11/09/23 1641                 This was a telemedicine visit I was at [facility] and patient was at [facility] video time was 5 minutes RT and nursing were in the room for the visit              Ramiro Flores MD  Department of Hospital Medicine  [facility]    COMPLETED  Family history is reviewed and has not changed "   Pertinent information:

## 2023-11-10 NOTE — PT/OT/SLP EVAL
Occupational Therapy   Evaluation    Name: Blue Abdul  MRN: [id number]  Admitting Diagnosis: Acute hypercapnic respiratory failure  Recent Surgery: * No surgery found *      Recommendations:     Discharge Recommendations: Low Intensity Therapy  Discharge Equipment Recommendations:  hospital bed, lift device   Barriers to discharge:  Inaccessible home and respiratory instability with vent dependence    Assessment:     Blue Abdul is a 33 y.o. male with a medical diagnosis of Acute hypercapnic respiratory failure.  He presents with the following performance deficits affecting function: weakness, impaired self care skills, impaired functional mobility, impaired cognition, impaired cardiopulmonary response to activity.  Patient did not follow any commands throughout evaluation.  During gentle AROM of upper and lower extremities, his RR increased to 40 breaths per minute, his HR increased to 130 BPM, and his O2 sats dropped to 72%.  He was not appropriate to attempt any bed mobility or transfers.  OT recommends a 1 week trial to determine if he is able to actively participate in therapy safely. If no improvement is observed, pt will be discharged to a ROM program for caregiver to perform.  His mother reported she was educated on a UE and LE ROM program at a previous hospital and she has been performing that but he does have some negative cardiovascular reactions when receiving Passive range of motion per her report.       Rehab Prognosis: Poor; patient would benefit from acute skilled OT services to address these deficits and reach maximum level of function.       Plan:     Patient to be seen 5 x/week to address the above listed problems via self-care/home management, neuromuscular re-education, therapeutic activities, therapeutic exercises  Plan of Care Expires: 11/17/23  Plan of Care Reviewed with: mother    Subjective     Chief Complaint: not responsive to cues,  vent dependence and lack of active  following of commands  Patient/Family Comments/goals: return home with mother     Occupational Profile:  Living Environment: lives with mother   Previous level of function: pt was able to perform ADL with supervision due to Down Syndrome; mother reports pt had 3-4 word vocabulary   Equipment Used at Home: none  Assistance upon Discharge: to be determined     Pain/Comfort:  Pain Rating 1:  (unresponsive)    Patients cultural, spiritual, Anabaptist conflicts given the current situation: no    Objective:     Communicated with: RN and mother prior to session.  Patient found supine with blood pressure cuff, rodriguez catheter, PEG Tube, peripheral IV, pulse ox (continuous), telemetry, ventilator, Tracheostomy upon OT entry to room.    General Precautions: Standard,    Orthopedic Precautions: N/A  Braces: N/A  Respiratory Status: Ventilator    Occupational Performance:    Functional Mobility/Transfers:  Patient with very high heart rate and high respiration rate with decreasing O2 sats while on the ventilator during Passive range of motion and not stable enough to attempt any bed mobility or transfers    Activities of Daily Living:  Dependent    Cognitive/Visual Perceptual:  Does not respond to cues     Physical Exam:  BUE motion:  WFL passively  BUE strength:  unable to follow any commands and did not demonstrate any Active Range of Motion in either of his UE's     AMPAC 6 Click ADL:  AMPAC Total Score: 6    Treatment & Education:  Eval only.  Educated mother on OT plan of care.     OT met face to face and performed supervision visit with JATINDER Cosby to discuss patient's diagnosis and POC including goals, intervention strategies and D/C planning.     Patient left supine with all lines intact, call button in reach, and mother present    GOALS:   Multidisciplinary Problems       Occupational Therapy Goals          Problem: Occupational Therapy    Goal Priority Disciplines Outcome Interventions   Occupational Therapy  Goal     OT, PT/OT Ongoing, Progressing    Description: ST. Pt will visually track from L<>R sides   2. Patient will follow one step command for Active Range of Motion of extremities to allow active participation in therapeutic intervention.       LTG  to be established if patient is able to participate in therapy.                        History:     Past Medical History:   Diagnosis Date    Diabetes mellitus     Digestive disorder     Seizures     Sleep apnea, unspecified        History reviewed. No pertinent surgical history.    Time Tracking:     OT Date of Treatment: 11/10/23  OT Start Time: 930  OT Stop Time: 950  OT Total Time (min): 20 min    Billable Minutes:Evaluation 20    11/10/2023

## 2023-11-10 NOTE — PLAN OF CARE
Problem: Physical Therapy  Goal: Physical Therapy Goal  Description: ST. Patient will follow one step command for Active Range of Motion of extremities to allow active participation in therapeutic intervention.       LTG- to be established if patient is able to participate in therapy.   Outcome: Ongoing, Not Progressing

## 2023-11-10 NOTE — PLAN OF CARE
Patient's SAT this AM was 100% on SIMV with FIO2 of 40%          Problem: Device-Related Complication Risk (Mechanical Ventilation, Invasive)  Goal: Optimal Device Function  Outcome: Ongoing, Progressing     Problem: Inability to Wean (Mechanical Ventilation, Invasive)  Goal: Mechanical Ventilation Liberation  Outcome: Ongoing, Progressing     Problem: Skin and Tissue Injury (Mechanical Ventilation, Invasive)  Goal: Absence of Device-Related Skin and Tissue Injury  Outcome: Ongoing, Progressing     Problem: Ventilator-Induced Lung Injury (Mechanical Ventilation, Invasive)  Goal: Absence of Ventilator-Induced Lung Injury  Outcome: Ongoing, Progressing     Problem: Communication Impairment (Artificial Airway)  Goal: Effective Communication  Outcome: Ongoing, Progressing     Problem: Device-Related Complication Risk (Artificial Airway)  Goal: Optimal Device Function  Outcome: Ongoing, Progressing     Problem: Skin and Tissue Injury (Artificial Airway)  Goal: Absence of Device-Related Skin or Tissue Injury  Outcome: Ongoing, Progressing

## 2023-11-10 NOTE — SUBJECTIVE & OBJECTIVE
Past Medical History:   Diagnosis Date    Diabetes mellitus     Digestive disorder     Seizures     Sleep apnea, unspecified        History reviewed. No pertinent surgical history.    Review of patient's allergies indicates:  Not on File    No current facility-administered medications on file prior to encounter.     Current Outpatient Medications on File Prior to Encounter   Medication Sig    albuterol-ipratropium (DUO-NEB) 2.5 mg-0.5 mg/3 mL nebulizer solution Take 3 mLs by nebulization every 6 (six) hours. Rescue    apixaban (ELIQUIS) 5 mg Tab 5 mg by Per G Tube route 2 (two) times daily.    levETIRAcetam (KEPPRA) 100 mg/mL Soln 500 mg by Per G Tube route 2 (two) times daily.    metoprolol tartrate (LOPRESSOR) 25 MG tablet 25 mg by Per G Tube route 2 (two) times daily.    pantoprazole (PROTONIX) 40 mg suspension 40 mg by Per G Tube route once daily.     Family History       Problem Relation (Age of Onset)    ADD / ADHD Sister    Hypertension Mother          Tobacco Use    Smoking status: Never    Smokeless tobacco: Never   Substance and Sexual Activity    Alcohol use: Never    Drug use: Never    Sexual activity: Never     Review of Systems   Constitutional:  Positive for activity change and fatigue.   HENT: Negative.     Eyes: Negative.    Respiratory:  Positive for cough.    Cardiovascular: Negative.    Gastrointestinal: Negative.    Endocrine: Negative.    Genitourinary: Negative.    Musculoskeletal: Negative.    Skin: Negative.    Allergic/Immunologic: Negative.    Neurological: Negative.    Hematological: Negative.    Psychiatric/Behavioral: Negative.       Objective:     Vital Signs (Most Recent):  Temp: 98.8 °F (37.1 °C) (11/09/23 1930)  Pulse: 88 (11/09/23 1930)  Resp: (!) 27 (11/09/23 1930)  BP: 123/69 (11/09/23 1930)  SpO2: 98 % (11/09/23 1930) Vital Signs (24h Range):  Temp:  [98.8 °F (37.1 °C)-99.6 °F (37.6 °C)] 98.8 °F (37.1 °C)  Pulse:  [] 88  Resp:  [27-45] 27  SpO2:  [94 %-99 %] 98 %  BP:  (115-123)/(54-69) 123/69     Weight: 84.8 kg (187 lb)  Body mass index is 37.77 kg/m².     Physical Exam  Vitals and nursing note reviewed. Exam conducted with a chaperone present.   Constitutional:       Appearance: He is ill-appearing.   HENT:      Head: Normocephalic.      Right Ear: Ear canal and external ear normal.      Left Ear: Ear canal and external ear normal.      Nose: Nose normal.      Mouth/Throat:      Mouth: Mucous membranes are moist.      Pharynx: Oropharynx is clear.   Eyes:      Conjunctiva/sclera: Conjunctivae normal.   Cardiovascular:      Rate and Rhythm: Normal rate and regular rhythm.      Pulses: Normal pulses.      Heart sounds: Normal heart sounds.   Pulmonary:      Effort: Pulmonary effort is normal.      Breath sounds: Normal breath sounds.      Comments: Mechanically ventilated SIMV  Abdominal:      General: Bowel sounds are normal.   Skin:     General: Skin is warm and dry.      Capillary Refill: Capillary refill takes 2 to 3 seconds.   Neurological:      Mental Status: Mental status is at baseline.      Comments: Patient does not follow commands and does not acknowledge the NP on initial exam.                 Significant Labs: All pertinent labs within the past 24 hours have been reviewed.  CBC:   Recent Labs   Lab 11/09/23  1700   WBC 11.03*   HGB 8.7*   HCT 29.6*   *     CMP:   Recent Labs   Lab 11/09/23  1700   *   K 4.0   CL 97*   CO2 27   *   BUN 12   CREATININE 0.62*   CALCIUM 9.0   PROT 7.1   ALBUMIN 1.9*   BILITOT 0.3   ALKPHOS 141*   AST 20   ALT 20   ANIONGAP 12     Urine Studies:   Recent Labs   Lab 11/09/23  1811   COLORU Yellow   APPEARANCEUA Clear   PHUR 6.0   SPECGRAV 1.020   PROTEINUA Negative   GLUCUA Negative   KETONESU Negative   BILIRUBINUA Negative   OCCULTUA Negative   NITRITE Negative   UROBILINOGEN 0.2   LEUKOCYTESUR Negative       Significant Imaging: I have reviewed all pertinent imaging results/findings within the past 24 hours.

## 2023-11-10 NOTE — HPI
33 y-old AAM with PMH of Trisomy 21, asthma, DM, GERD, and LILLIE. Patient presented to an outside hospital system on 09/08/2023 for behavioral issues r/t medication changes. While at the hospital he experienced a hypoglycemic episode with aspiration and was coded. Patient developed new onset seizure at that time with MRI showing cerebral edema. Patient experienced difficulty with vent weaning hattie trach was placed on 09/27/2023, PEG tube placed on 10/05/2023. Patient is admitted to [facility] for continued vent weaning, PT/OT eval and treatment.

## 2023-11-10 NOTE — ASSESSMENT & PLAN NOTE
"Patient's FSGs are uncontrolled due to hyperglycemia on current medication regimen.  Last A1c reviewed-   Lab Results   Component Value Date    HGBA1C 6.5 10/28/2020     Most recent fingerstick glucose reviewed- No results for input(s): "POCTGLUCOSE" in the last 24 hours.  Current correctional scale  Low  Maintain anti-hyperglycemic dose as follows-   Antihyperglycemics (From admission, onward)    None        Hold Oral hypoglycemics while patient is in the hospital.  "

## 2023-11-11 LAB
GLUCOSE SERPL-MCNC: 131 MG/DL (ref 70–105)
GLUCOSE SERPL-MCNC: 131 MG/DL (ref 70–105)
GLUCOSE SERPL-MCNC: 132 MG/DL (ref 70–105)
GLUCOSE SERPL-MCNC: 138 MG/DL (ref 70–105)
GLUCOSE SERPL-MCNC: 148 MG/DL (ref 70–105)

## 2023-11-11 PROCEDURE — 94761 N-INVAS EAR/PLS OXIMETRY MLT: CPT

## 2023-11-11 PROCEDURE — 25000003 PHARM REV CODE 250: Performed by: REGISTERED NURSE

## 2023-11-11 PROCEDURE — 99900035 HC TECH TIME PER 15 MIN (STAT)

## 2023-11-11 PROCEDURE — 99900026 HC AIRWAY MAINTENANCE (STAT)

## 2023-11-11 PROCEDURE — 94003 VENT MGMT INPAT SUBQ DAY: CPT

## 2023-11-11 PROCEDURE — 27000221 HC OXYGEN, UP TO 24 HOURS

## 2023-11-11 PROCEDURE — 27000935

## 2023-11-11 PROCEDURE — 11000004 HC SNF PRIVATE

## 2023-11-11 PROCEDURE — 25000242 PHARM REV CODE 250 ALT 637 W/ HCPCS: Performed by: INTERNAL MEDICINE

## 2023-11-11 PROCEDURE — 27000958

## 2023-11-11 PROCEDURE — 27200966 HC CLOSED SUCTION SYSTEM

## 2023-11-11 PROCEDURE — 94640 AIRWAY INHALATION TREATMENT: CPT

## 2023-11-11 PROCEDURE — 82962 GLUCOSE BLOOD TEST: CPT

## 2023-11-11 RX ADMIN — METOPROLOL TARTRATE 25 MG: 25 TABLET, FILM COATED ORAL at 08:11

## 2023-11-11 RX ADMIN — APIXABAN 5 MG: 2.5 TABLET, FILM COATED ORAL at 09:11

## 2023-11-11 RX ADMIN — IPRATROPIUM BROMIDE AND ALBUTEROL SULFATE 3 ML: 2.5; .5 SOLUTION RESPIRATORY (INHALATION) at 07:11

## 2023-11-11 RX ADMIN — APIXABAN 5 MG: 2.5 TABLET, FILM COATED ORAL at 08:11

## 2023-11-11 RX ADMIN — LEVETIRACETAM 500 MG: 500 SOLUTION ORAL at 08:11

## 2023-11-11 RX ADMIN — METOPROLOL TARTRATE 25 MG: 25 TABLET, FILM COATED ORAL at 09:11

## 2023-11-11 RX ADMIN — LEVETIRACETAM 500 MG: 500 SOLUTION ORAL at 09:11

## 2023-11-11 RX ADMIN — PANTOPRAZOLE SODIUM 40 MG: 40 GRANULE, DELAYED RELEASE ORAL at 09:11

## 2023-11-11 NOTE — PLAN OF CARE
Problem: Adult Inpatient Plan of Care  Goal: Plan of Care Review  Outcome: Ongoing, Progressing  Goal: Patient-Specific Goal (Individualized)  Outcome: Ongoing, Progressing  Goal: Absence of Hospital-Acquired Illness or Injury  Outcome: Ongoing, Progressing  Goal: Optimal Comfort and Wellbeing  Outcome: Ongoing, Progressing  Goal: Readiness for Transition of Care  Outcome: Ongoing, Progressing     Problem: Communication Impairment (Mechanical Ventilation, Invasive)  Goal: Effective Communication  Outcome: Ongoing, Progressing     Problem: Device-Related Complication Risk (Mechanical Ventilation, Invasive)  Goal: Optimal Device Function  Outcome: Ongoing, Progressing     Problem: Inability to Wean (Mechanical Ventilation, Invasive)  Goal: Mechanical Ventilation Liberation  Outcome: Ongoing, Progressing     Problem: Nutrition Impairment (Mechanical Ventilation, Invasive)  Goal: Optimal Nutrition Delivery  Outcome: Ongoing, Progressing     Problem: Skin and Tissue Injury (Mechanical Ventilation, Invasive)  Goal: Absence of Device-Related Skin and Tissue Injury  Outcome: Ongoing, Progressing     Problem: Ventilator-Induced Lung Injury (Mechanical Ventilation, Invasive)  Goal: Absence of Ventilator-Induced Lung Injury  Outcome: Ongoing, Progressing     Problem: Communication Impairment (Artificial Airway)  Goal: Effective Communication  Outcome: Ongoing, Progressing     Problem: Device-Related Complication Risk (Artificial Airway)  Goal: Optimal Device Function  Outcome: Ongoing, Progressing     Problem: Skin and Tissue Injury (Artificial Airway)  Goal: Absence of Device-Related Skin or Tissue Injury  Outcome: Ongoing, Progressing     Problem: Noninvasive Ventilation Acute  Goal: Effective Unassisted Ventilation and Oxygenation  Outcome: Ongoing, Progressing     Problem: Fall Injury Risk  Goal: Absence of Fall and Fall-Related Injury  Outcome: Ongoing, Progressing     Problem: Skin Injury Risk Increased  Goal: Skin  Health and Integrity  Outcome: Ongoing, Progressing     Problem: Impaired Wound Healing  Goal: Optimal Wound Healing  Outcome: Ongoing, Progressing     Problem: Diabetes Comorbidity  Goal: Blood Glucose Level Within Targeted Range  Outcome: Ongoing, Progressing     Problem: Infection  Goal: Absence of Infection Signs and Symptoms  Outcome: Ongoing, Progressing     Problem: Bariatric Environmental Safety  Goal: Safety Maintained with Care  Outcome: Ongoing, Progressing

## 2023-11-11 NOTE — PLAN OF CARE
Problem: Adult Inpatient Plan of Care  Goal: Plan of Care Review  Outcome: Ongoing, Progressing     Problem: Adult Inpatient Plan of Care  Goal: Absence of Hospital-Acquired Illness or Injury  Outcome: Ongoing, Progressing     Problem: Adult Inpatient Plan of Care  Goal: Optimal Comfort and Wellbeing  Outcome: Ongoing, Progressing     Problem: Nutrition Impairment (Mechanical Ventilation, Invasive)  Goal: Optimal Nutrition Delivery  Outcome: Ongoing, Progressing

## 2023-11-11 NOTE — NURSING
Pt vomited feeding. Feeding held, will reassess within an hour. Respiratory called, I suctioned pt 3 times, RT suctioned pt. Feeding noted coming from tracheostomy. Vs stable, o2 sats 98%, no s/s resp distress. MD notified.

## 2023-11-11 NOTE — CARE UPDATE
Placed back on SIMV. Patient did 2 - 2 hour trials and did well with them. Patient will get another 2 hour trial tonight.

## 2023-11-12 LAB
ANION GAP SERPL CALCULATED.3IONS-SCNC: 13 MMOL/L (ref 7–16)
BUN SERPL-MCNC: 8 MG/DL (ref 7–18)
BUN/CREAT SERPL: 15 (ref 6–20)
CALCIUM SERPL-MCNC: 8.9 MG/DL (ref 8.5–10.1)
CHLORIDE SERPL-SCNC: 98 MMOL/L (ref 98–107)
CO2 SERPL-SCNC: 29 MMOL/L (ref 21–32)
CREAT SERPL-MCNC: 0.55 MG/DL (ref 0.7–1.3)
CULTURE, LOWER RESPIRATORY: ABNORMAL
CULTURE, LOWER RESPIRATORY: ABNORMAL
EGFR (NO RACE VARIABLE) (RUSH/TITUS): 136 ML/MIN/1.73M2
GLUCOSE SERPL-MCNC: 132 MG/DL (ref 70–105)
GLUCOSE SERPL-MCNC: 134 MG/DL (ref 70–105)
GLUCOSE SERPL-MCNC: 141 MG/DL (ref 74–106)
GLUCOSE SERPL-MCNC: 143 MG/DL (ref 70–105)
GLUCOSE SERPL-MCNC: 159 MG/DL (ref 70–105)
GRAM STN SPEC: ABNORMAL
GRAM STN SPEC: ABNORMAL
POTASSIUM SERPL-SCNC: 3.8 MMOL/L (ref 3.5–5.1)
SODIUM SERPL-SCNC: 136 MMOL/L (ref 136–145)

## 2023-11-12 PROCEDURE — 99900026 HC AIRWAY MAINTENANCE (STAT)

## 2023-11-12 PROCEDURE — 94761 N-INVAS EAR/PLS OXIMETRY MLT: CPT

## 2023-11-12 PROCEDURE — 27000221 HC OXYGEN, UP TO 24 HOURS

## 2023-11-12 PROCEDURE — 27200966 HC CLOSED SUCTION SYSTEM

## 2023-11-12 PROCEDURE — 25000003 PHARM REV CODE 250: Performed by: REGISTERED NURSE

## 2023-11-12 PROCEDURE — 25000242 PHARM REV CODE 250 ALT 637 W/ HCPCS: Performed by: INTERNAL MEDICINE

## 2023-11-12 PROCEDURE — 99900035 HC TECH TIME PER 15 MIN (STAT)

## 2023-11-12 PROCEDURE — 94640 AIRWAY INHALATION TREATMENT: CPT

## 2023-11-12 PROCEDURE — 27000935

## 2023-11-12 PROCEDURE — 82962 GLUCOSE BLOOD TEST: CPT

## 2023-11-12 PROCEDURE — 25000003 PHARM REV CODE 250: Performed by: INTERNAL MEDICINE

## 2023-11-12 PROCEDURE — 94003 VENT MGMT INPAT SUBQ DAY: CPT

## 2023-11-12 PROCEDURE — 11000004 HC SNF PRIVATE

## 2023-11-12 PROCEDURE — 27000958

## 2023-11-12 PROCEDURE — 80048 BASIC METABOLIC PNL TOTAL CA: CPT | Performed by: INTERNAL MEDICINE

## 2023-11-12 RX ORDER — SODIUM CHLORIDE 450 MG/100ML
INJECTION, SOLUTION INTRAVENOUS CONTINUOUS
Status: DISPENSED | OUTPATIENT
Start: 2023-11-12 | End: 2023-11-13

## 2023-11-12 RX ORDER — PANTOPRAZOLE SODIUM 40 MG/1
40 FOR SUSPENSION ORAL 2 TIMES DAILY
Status: DISCONTINUED | OUTPATIENT
Start: 2023-11-12 | End: 2024-01-31 | Stop reason: HOSPADM

## 2023-11-12 RX ADMIN — SODIUM CHLORIDE: 4.5 INJECTION, SOLUTION INTRAVENOUS at 09:11

## 2023-11-12 RX ADMIN — LEVETIRACETAM 500 MG: 500 SOLUTION ORAL at 09:11

## 2023-11-12 RX ADMIN — APIXABAN 5 MG: 2.5 TABLET, FILM COATED ORAL at 09:11

## 2023-11-12 RX ADMIN — PANTOPRAZOLE SODIUM 40 MG: 40 GRANULE, DELAYED RELEASE ORAL at 09:11

## 2023-11-12 RX ADMIN — IPRATROPIUM BROMIDE AND ALBUTEROL SULFATE 3 ML: 2.5; .5 SOLUTION RESPIRATORY (INHALATION) at 07:11

## 2023-11-12 NOTE — PLAN OF CARE
Patient's SAT this AM was 93% on FIO2 of 40%        Problem: Device-Related Complication Risk (Mechanical Ventilation, Invasive)  Goal: Optimal Device Function  Outcome: Ongoing, Progressing     Problem: Skin and Tissue Injury (Mechanical Ventilation, Invasive)  Goal: Absence of Device-Related Skin and Tissue Injury  Outcome: Ongoing, Progressing     Problem: Ventilator-Induced Lung Injury (Mechanical Ventilation, Invasive)  Goal: Absence of Ventilator-Induced Lung Injury  Outcome: Ongoing, Progressing     Problem: Device-Related Complication Risk (Artificial Airway)  Goal: Optimal Device Function  Outcome: Ongoing, Progressing     Problem: Skin and Tissue Injury (Artificial Airway)  Goal: Absence of Device-Related Skin or Tissue Injury  Outcome: Ongoing, Progressing

## 2023-11-12 NOTE — NURSING
Pt vomited tube feeding about an hour after tube feeding was restarted. Respiratory called, I suctioned pt twice, some tube feeding noted in tracheostomy. Rt suctioned pt. MD notified, pending KUB and held tube feedings until further changes.

## 2023-11-12 NOTE — RESPIRATORY THERAPY
Trial #1    0911 Put patient on AVAPS.  Vital signs were: Heart Rate 110, Respirations 21, SAT was 93% with FIO2 of 40%.  Patient was awake and did not appear to be in any distress.    1207  Switched patient from AVAPS to SIMV mode.  Vital yadiel were: Heart Rate 100, SAT 99% and Respirations were 24.  Patient was resting comfortably.  No problems or issues during this trial.        Trial #2    1415  Switched patient from SIMV to AVAPS mode. Vital signs were: Heart Rate 99%, SAT 98, Respirations 16.  Patient was asleep and resting comfortably.      1703  Switched patient form AVAPS to SIMV mode.  Vital signs were Heart Rate 97, SAT 95%, respirations 24.  Patient was awake and resting comfortably.  There were no problems durig this trial

## 2023-11-12 NOTE — PLAN OF CARE
Patient did two 2 hour AVAPS trials today and passed each. He is currently on his third two hour trial. O2 sat 95% prior to treatment and 98% post.

## 2023-11-13 LAB
GLUCOSE SERPL-MCNC: 126 MG/DL (ref 70–105)
GLUCOSE SERPL-MCNC: 141 MG/DL (ref 70–105)
GLUCOSE SERPL-MCNC: 148 MG/DL (ref 70–105)
GLUCOSE SERPL-MCNC: 154 MG/DL (ref 70–105)

## 2023-11-13 PROCEDURE — 11000004 HC SNF PRIVATE

## 2023-11-13 PROCEDURE — 27000958

## 2023-11-13 PROCEDURE — 94003 VENT MGMT INPAT SUBQ DAY: CPT

## 2023-11-13 PROCEDURE — 94640 AIRWAY INHALATION TREATMENT: CPT

## 2023-11-13 PROCEDURE — 25000003 PHARM REV CODE 250: Performed by: INTERNAL MEDICINE

## 2023-11-13 PROCEDURE — 25000003 PHARM REV CODE 250: Performed by: REGISTERED NURSE

## 2023-11-13 PROCEDURE — 94761 N-INVAS EAR/PLS OXIMETRY MLT: CPT

## 2023-11-13 PROCEDURE — 27000935

## 2023-11-13 PROCEDURE — 99900026 HC AIRWAY MAINTENANCE (STAT)

## 2023-11-13 PROCEDURE — 27000221 HC OXYGEN, UP TO 24 HOURS

## 2023-11-13 PROCEDURE — 25000242 PHARM REV CODE 250 ALT 637 W/ HCPCS: Performed by: INTERNAL MEDICINE

## 2023-11-13 PROCEDURE — 99900035 HC TECH TIME PER 15 MIN (STAT)

## 2023-11-13 PROCEDURE — 97110 THERAPEUTIC EXERCISES: CPT | Mod: CO

## 2023-11-13 PROCEDURE — 97140 MANUAL THERAPY 1/> REGIONS: CPT

## 2023-11-13 PROCEDURE — 82962 GLUCOSE BLOOD TEST: CPT

## 2023-11-13 RX ADMIN — APIXABAN 5 MG: 2.5 TABLET, FILM COATED ORAL at 09:11

## 2023-11-13 RX ADMIN — LEVETIRACETAM 500 MG: 500 SOLUTION ORAL at 09:11

## 2023-11-13 RX ADMIN — IPRATROPIUM BROMIDE AND ALBUTEROL SULFATE 3 ML: 2.5; .5 SOLUTION RESPIRATORY (INHALATION) at 07:11

## 2023-11-13 RX ADMIN — PANTOPRAZOLE SODIUM 40 MG: 40 GRANULE, DELAYED RELEASE ORAL at 09:11

## 2023-11-13 NOTE — PLAN OF CARE
[facility] - Medical Surgical Unit  Discharge Reassessment    Primary Care Provider: Nati Doyle FNP    Expected Discharge Date:     Reassessment (most recent)       Discharge Reassessment - 11/13/23 1156          Discharge Reassessment    Assessment Type Discharge Planning Assessment     Did the patient's condition or plan change since previous assessment? No     Discharge Plan discussed with: Caregiver     Name(s) and Number(s) Linn     Communicated CRICKET with patient/caregiver Date not available/Unable to determine     Discharge Plan A Home with family;Home Health     DME Needed Upon Discharge  nutrition supplies;oxygen;suction machine;ventilator;colostomy/ostomy supplies;lift device;hospital bed;feeding device     Transition of Care Barriers Mobility     Why the patient remains in the hospital Requires continued medical care        Post-Acute Status    Post-Acute Authorization Home Health     Patient choice form signed by patient/caregiver List with quality metrics by geographic area provided     Discharge Delays None known at this time                 Weaning vent and PT an OT  DR Flores will see tomorrow and invited mother Linn to swingbed meeting discharge planis homewith family and will need all DME and education

## 2023-11-13 NOTE — PT/OT/SLP PROGRESS
Occupational Therapy   Treatment    Name: Blue Abdul  MRN: [id number]  Admitting Diagnosis:  Acute hypercapnic respiratory failure       Recommendations:     Discharge Recommendations: Low Intensity Therapy  Discharge Equipment Recommendations:  hospital bed, lift device, wheelchair  Barriers to discharge:       Assessment:     Blue Abdul is a 33 y.o. male with a medical diagnosis of Acute hypercapnic respiratory failure.  He presents with the following Performance deficits affecting function are weakness, decreased upper extremity function, impaired endurance, impaired balance, decreased safety awareness, impaired self care skills, impaired functional mobility, decreased coordination, impaired cardiopulmonary response to activity.   Case conference regarding plan of care and goals completed with Alcides Jerez prior to treatment.   Rehab Prognosis:  Fair and Poor; patient would benefit from acute skilled OT services to address these deficits and reach maximum level of function.       Plan:     Patient to be seen 5 x/week to address the above listed problems via self-care/home management, therapeutic activities, therapeutic exercises, neuromuscular re-education  Plan of Care Expires: 11/17/23  Plan of Care Reviewed with: mother    Subjective   Patient was laying supine in bed upon TOBIAS arrival. Patient mom was in room throughout treatment. Patient is unable to respond and track with eyes when talked to. TOBIAS completed PROM on BUE today for treatment.   Pain/Comfort:  Pain Rating 1: 0/10    Objective:     Communicated with: nursing staff prior to session.  Patient found HOB elevated with blood pressure cuff, rodriguez catheter, oxygen, pulse ox (continuous), PEG Tube, telemetry, Tracheostomy, ventilator, peripheral IV upon OT entry to room.    General Precautions: Standard, fall, contact    Orthopedic Precautions:N/A  Braces: N/A  Respiratory Status: Ventilator    Therapeutic exercises:  Patient  completed the following PROM exercises on right and left upper extremities  to work on ROM/strengthening in preparation to complete of bed mobility, ADLs and transfers:     -Elbow flexion/extension: 2 sets of 10   -Shoulder flexion: 2 sets of 10   -Chest press: 2 sets of 10   -Internal Rotation/External Rotation: 2 sets of 10    Increased time/effort needed to complete each exercise.       Patient left HOB elevated with all lines intact, call button in reach, RN notified, and mom present    GOALS:   Multidisciplinary Problems       Occupational Therapy Goals          Problem: Occupational Therapy    Goal Priority Disciplines Outcome Interventions   Occupational Therapy Goal     OT, PT/OT Ongoing, Progressing    Description: ST. Pt will visually track from L<>R sides   2. Patient will follow one step command for Active Range of Motion of extremities to allow active participation in therapeutic intervention.       LTG  to be established if patient is able to participate in therapy.                        Time Tracking:     OT Date of Treatment: 23  OT Start Time: 1425  OT Stop Time: 1440  OT Total Time (min): 15 min    Billable Minutes:Therapeutic Exercise 15 minutes    OT/PARDEEP: PARDEEP     Number of PARDEEP visits since last OT visit: 1    PARDEEP Cosby  2023  3:34 PM

## 2023-11-13 NOTE — RESPIRATORY THERAPY
0957  Switched patient from SIMV to AVAPS.  Patient's vitals were Heart Rate 111, Respirations 16, % on 40% FIO2.  Patient was awake and resting comfortably.      1400  Switched patient from AVAPS to SIMV.  Patient's vitals were Heart Rate 124, Respirations 41, % on 40% FIO2.  Patient was awake and was a little anxious.  Changed out HME and suctioned patient, that helped respirations and heart rate went down some.

## 2023-11-13 NOTE — PLAN OF CARE
Patient's SAT this AM was 100% on SIMV with 40% FIO2        Problem: Device-Related Complication Risk (Mechanical Ventilation, Invasive)  Goal: Optimal Device Function  Outcome: Ongoing, Progressing     Problem: Inability to Wean (Mechanical Ventilation, Invasive)  Goal: Mechanical Ventilation Liberation  Outcome: Ongoing, Progressing     Problem: Skin and Tissue Injury (Mechanical Ventilation, Invasive)  Goal: Absence of Device-Related Skin and Tissue Injury  Outcome: Ongoing, Progressing     Problem: Device-Related Complication Risk (Artificial Airway)  Goal: Optimal Device Function  Outcome: Ongoing, Progressing     Problem: Skin and Tissue Injury (Artificial Airway)  Goal: Absence of Device-Related Skin or Tissue Injury  Outcome: Ongoing, Progressing

## 2023-11-13 NOTE — CARE UPDATE
2255 Placed patient back on simv rate of 8 vt 450 peep 5 PS 12 @ 40%. Patient did 3 hour avaps trial with no complications.  RR 29 Sats 100%. CH crt

## 2023-11-14 LAB
ANION GAP SERPL CALCULATED.3IONS-SCNC: 15 MMOL/L (ref 7–16)
ANISOCYTOSIS BLD QL SMEAR: ABNORMAL
BASOPHILS # BLD AUTO: 0.05 K/UL (ref 0–0.2)
BASOPHILS NFR BLD AUTO: 0.7 % (ref 0–1)
BUN SERPL-MCNC: 7 MG/DL (ref 7–18)
BUN/CREAT SERPL: 11 (ref 6–20)
CALCIUM SERPL-MCNC: 8.8 MG/DL (ref 8.5–10.1)
CHLORIDE SERPL-SCNC: 100 MMOL/L (ref 98–107)
CO2 SERPL-SCNC: 26 MMOL/L (ref 21–32)
CREAT SERPL-MCNC: 0.64 MG/DL (ref 0.7–1.3)
DIFFERENTIAL METHOD BLD: ABNORMAL
EGFR (NO RACE VARIABLE) (RUSH/TITUS): 130 ML/MIN/1.73M2
EOSINOPHIL # BLD AUTO: 0.17 K/UL (ref 0–0.5)
EOSINOPHIL NFR BLD AUTO: 2.4 % (ref 1–4)
EOSINOPHIL NFR BLD MANUAL: 1 % (ref 1–4)
ERYTHROCYTE [DISTWIDTH] IN BLOOD BY AUTOMATED COUNT: 16.3 % (ref 11.5–14.5)
GLUCOSE SERPL-MCNC: 128 MG/DL (ref 70–105)
GLUCOSE SERPL-MCNC: 136 MG/DL (ref 70–105)
GLUCOSE SERPL-MCNC: 147 MG/DL (ref 74–106)
GLUCOSE SERPL-MCNC: 149 MG/DL (ref 70–105)
GLUCOSE SERPL-MCNC: 150 MG/DL (ref 70–105)
GLUCOSE SERPL-MCNC: 162 MG/DL (ref 70–105)
HCT VFR BLD AUTO: 29.3 % (ref 40–54)
HGB BLD-MCNC: 8.7 G/DL (ref 13.5–18)
HYPOCHROMIA BLD QL SMEAR: ABNORMAL
LYMPHOCYTES # BLD AUTO: 1.17 K/UL (ref 1–4.8)
LYMPHOCYTES NFR BLD AUTO: 16.9 % (ref 27–41)
LYMPHOCYTES NFR BLD MANUAL: 26 % (ref 27–41)
MCH RBC QN AUTO: 27.5 PG (ref 27–31)
MCHC RBC AUTO-ENTMCNC: 29.7 G/DL (ref 32–36)
MCV RBC AUTO: 92.7 FL (ref 80–96)
MONOCYTES # BLD AUTO: 0.54 K/UL (ref 0–0.8)
MONOCYTES NFR BLD AUTO: 7.8 % (ref 2–6)
MONOCYTES NFR BLD MANUAL: 4 % (ref 2–6)
MPC BLD CALC-MCNC: 9.5 FL (ref 9.4–12.4)
NEUTROPHILS # BLD AUTO: 5.01 K/UL (ref 1.8–7.7)
NEUTROPHILS NFR BLD AUTO: 72.2 % (ref 53–65)
NEUTS BAND NFR BLD MANUAL: 4 % (ref 1–5)
NEUTS SEG NFR BLD MANUAL: 65 % (ref 50–62)
NRBC BLD MANUAL-RTO: ABNORMAL %
PLATELET # BLD AUTO: 550 K/UL (ref 150–400)
POIKILOCYTOSIS BLD QL SMEAR: ABNORMAL
POLYCHROMASIA BLD QL SMEAR: ABNORMAL
POTASSIUM SERPL-SCNC: 3.8 MMOL/L (ref 3.5–5.1)
RBC # BLD AUTO: 3.16 M/UL (ref 4.6–6.2)
SODIUM SERPL-SCNC: 137 MMOL/L (ref 136–145)
WBC # BLD AUTO: 6.94 K/UL (ref 4.5–11)

## 2023-11-14 PROCEDURE — 27000939

## 2023-11-14 PROCEDURE — 94003 VENT MGMT INPAT SUBQ DAY: CPT

## 2023-11-14 PROCEDURE — 94640 AIRWAY INHALATION TREATMENT: CPT

## 2023-11-14 PROCEDURE — 85025 COMPLETE CBC W/AUTO DIFF WBC: CPT | Performed by: INTERNAL MEDICINE

## 2023-11-14 PROCEDURE — 99900026 HC AIRWAY MAINTENANCE (STAT)

## 2023-11-14 PROCEDURE — 99900035 HC TECH TIME PER 15 MIN (STAT)

## 2023-11-14 PROCEDURE — 25000003 PHARM REV CODE 250: Performed by: REGISTERED NURSE

## 2023-11-14 PROCEDURE — 11000004 HC SNF PRIVATE

## 2023-11-14 PROCEDURE — 25000003 PHARM REV CODE 250: Performed by: INTERNAL MEDICINE

## 2023-11-14 PROCEDURE — 82962 GLUCOSE BLOOD TEST: CPT

## 2023-11-14 PROCEDURE — 25000242 PHARM REV CODE 250 ALT 637 W/ HCPCS: Performed by: INTERNAL MEDICINE

## 2023-11-14 PROCEDURE — 97110 THERAPEUTIC EXERCISES: CPT | Mod: CO

## 2023-11-14 PROCEDURE — A6212 FOAM DRG <=16 SQ IN W/BORDER: HCPCS

## 2023-11-14 PROCEDURE — 27000221 HC OXYGEN, UP TO 24 HOURS

## 2023-11-14 PROCEDURE — 94761 N-INVAS EAR/PLS OXIMETRY MLT: CPT

## 2023-11-14 PROCEDURE — 97110 THERAPEUTIC EXERCISES: CPT | Mod: CQ

## 2023-11-14 PROCEDURE — 80048 BASIC METABOLIC PNL TOTAL CA: CPT | Performed by: INTERNAL MEDICINE

## 2023-11-14 RX ADMIN — LEVETIRACETAM 500 MG: 500 SOLUTION ORAL at 09:11

## 2023-11-14 RX ADMIN — IPRATROPIUM BROMIDE AND ALBUTEROL SULFATE 3 ML: 2.5; .5 SOLUTION RESPIRATORY (INHALATION) at 07:11

## 2023-11-14 RX ADMIN — PANTOPRAZOLE SODIUM 40 MG: 40 GRANULE, DELAYED RELEASE ORAL at 09:11

## 2023-11-14 RX ADMIN — APIXABAN 5 MG: 2.5 TABLET, FILM COATED ORAL at 09:11

## 2023-11-14 RX ADMIN — APIXABAN 5 MG: 2.5 TABLET, FILM COATED ORAL at 08:11

## 2023-11-14 RX ADMIN — LEVETIRACETAM 500 MG: 500 SOLUTION ORAL at 08:11

## 2023-11-14 RX ADMIN — PANTOPRAZOLE SODIUM 40 MG: 40 GRANULE, DELAYED RELEASE ORAL at 08:11

## 2023-11-14 NOTE — PLAN OF CARE
Patient's SAT this AM was 100% on SIMV with FIO2 of 40%        Problem: Device-Related Complication Risk (Mechanical Ventilation, Invasive)  Goal: Optimal Device Function  Outcome: Ongoing, Progressing     Problem: Skin and Tissue Injury (Mechanical Ventilation, Invasive)  Goal: Absence of Device-Related Skin and Tissue Injury  Outcome: Ongoing, Progressing     Problem: Ventilator-Induced Lung Injury (Mechanical Ventilation, Invasive)  Goal: Absence of Ventilator-Induced Lung Injury  Outcome: Ongoing, Progressing     Problem: Device-Related Complication Risk (Artificial Airway)  Goal: Optimal Device Function  Outcome: Ongoing, Progressing     Problem: Skin and Tissue Injury (Artificial Airway)  Goal: Absence of Device-Related Skin or Tissue Injury  Outcome: Ongoing, Progressing

## 2023-11-14 NOTE — PLAN OF CARE
Weekly Swing Bed Notes    Patient is doing AVAPS trials today.  He did AVAPS for 5 hours with the following settings , Fio2 50%.  Patient will do another trial tonight for 5 hours.  He will rest on SIMV with , rate 8, PS 12, peep 5, Fio2 40%.  BBS coarse.  Sats have been ranging 92% to 97% today.

## 2023-11-14 NOTE — PROGRESS NOTES
[facility] - Medical Surgical Unit  Adult Nutrition  Consult Note    SUMMARY     Recommendations    Recommendation/Intervention: 1. Change TF to: Peptamen AF @50ml with 35ml water every 1 hour=1440 kcal, 91g PRO, 1813ml free water, 2040ml total fluids 2. F/U to monitor progress and NS  Goals: Meet % via NS  Nutrition Goal Status: new  Communication of RD Recs: discussed on rounds    Assessment and Plan    No new Assessment & Plan notes have been filed under this hospital service since the last note was generated.  Service: Nutrition       Malnutrition Assessment  Malnutrition Level:  (Pt is at high risk for malnutrition R/T multiple medical problems.)  Skin (Micronutrient): red, thinned                                 Reason for Assessment    Reason For Assessment: consult, ginna Barrie (chronic vent pt)  Diagnosis:  (Behavioral change, hypoglycemic episode, Trach/PEG, Trisomy 21)  Relevant Medical History: Trisomy 21, DM, GERD, LILLIE, Seizure disorder, Obesity, IBS  Interdisciplinary Rounds: did not attend  General Information Comments: RDN visited pt this a.m. and talked with pt's mother.  He has been having gas, belching and vomitted several times but no inc residuals noted.  Protonix was started.  Pt's mother stated that he is a mouth breather and had GI problems prior to this event.  Last BM 11/13.  He has several HR areas including L thigh (open), L heel, scrotum, Aroud PEG. Will adjust TF then consider treating skin issues.    Nutrition Risk Screen    Nutrition Risk Screen: tube feeding or parenteral nutrition    Nutrition/Diet History    Spiritual, Cultural Beliefs, Jew Practices, Values that Affect Care: no  Food Allergies: NKFA  Factors Affecting Nutritional Intake: NPO  Nutrition Support Formula Prior to Admit: Glucerna 1.5  Nutrition Support Rate Prior to Admit: 40 (ml)  Nutrtion Support Frequency Prior to Admit: continuous  Nutrition Support Provision Prior to Admit:  "PEG    Anthropometrics    Temp: 98.4 °F (36.9 °C)  Height: 4' 11" (149.9 cm)  Height (inches): 59 in  Weight Method: Bed Scale  Weight: 91.2 kg (201 lb)  Weight (lb): 201 lb  Ideal Body Weight (IBW), Male: 100 lb  % Ideal Body Weight, Male (lb): 201 %  BMI (Calculated): 40.6       Lab/Procedures/Meds    Pertinent Labs Reviewed: reviewed  Pertinent Labs Comments: Alb 1.9, B-159, Hgb A1C 6.4  Pertinent Medications Reviewed: reviewed  Pertinent Medications Comments: Protonix    Physical Findings/Assessment         Estimated/Assessed Needs    Weight Used For Calorie Calculations: 56.7 kg (125 lb)  Energy Calorie Requirements (kcal): 6618-0798  Energy Need Method: Kcal/kg  Protein Requirements: 57-63  Weight Used For Protein Calculations: 56.7 kg (125 lb)     Estimated Fluid Requirement Method: other (see comments) (0942-6413)  RDA Method (mL): 1417         Nutrition Prescription Ordered    Current Nutrition Support Formula Ordered: Glucerna 1.5  Current Nutrition Support Rate Ordered: 40 (ml)  Current Nutrition Support Frequency Ordered: continuous    Evaluation of Received Nutrient/Fluid Intake    Enteral Calories (kcal): 1440  Enteral Protein (gm): 79  Enteral (Free Water) Fluid (mL): 728  Free Water Flush Fluid (mL): 1080  % Kcal Needs: 100  % Protein Needs: 100  IV Fluid (mL): 1728  Total Fluid Intake (mL): 19 (ABW, 30ml per kg AdjBW)  Energy Calories Required: meeting needs  Protein Required: meeting needs  Fluid Required: meeting needs  Tolerance: not tolerating  % Intake of Estimated Energy Needs: 75 - 100 %  % Meal Intake: NPO    Nutrition Risk    Level of Risk/Frequency of Follow-up: high       Monitor and Evaluation    Food and Nutrient Intake: enteral nutrition intake  Food and Nutrient Adminstration: enteral and parenteral nutrition administration  Anthropometric Measurements: weight  Biochemical Data, Medical Tests and Procedures: electrolyte and renal panel, glucose/endocrine " profile  Nutrition-Focused Physical Findings: overall appearance, skin       Nutrition Follow-Up    RD Follow-up?: Yes

## 2023-11-14 NOTE — PT/OT/SLP PROGRESS
Physical Therapy Treatment    Patient Name:  Blue Abdul   MRN:  [id number]    Recommendations:     Discharge Recommendations: Low Intensity Therapy  Discharge Equipment Recommendations: hospital bed, lift device  Barriers to discharge: Decreased caregiver support    Assessment:     Blue Abdul is a 33 y.o. male admitted with a medical diagnosis of Acute hypercapnic respiratory failure.  He presents with the following impairments/functional limitations: weakness, impaired functional mobility, impaired cognition, decreased safety awareness, impaired coordination, impaired endurance, gait instability, decreased coordination, impaired fine motor, impaired sensation, impaired balance, impaired self care skills, visual deficits, decreased lower extremity function .    Pt was supine, pt unable to voice pain , pt presented no facial grimacing.  Pt tolerated tx poor, unable to perform any AROM or track therapist with eyes with voice cues.     Rehab Prognosis: Poor; patient would benefit from acute skilled PT services to address these deficits and reach maximum level of function.    Recent Surgery: * No surgery found *      Plan:     During this hospitalization, patient to be seen 5 x/week to address the identified rehab impairments via therapeutic exercises and progress toward the following goals:    Plan of Care Expires:  11/17/23    Subjective     Chief Complaint: no pain voiced, no facial grimacing present  Patient/Family Comments/goals: increased BLE ROM  Pain/Comfort:  Pain Rating 1: 0/10  Pain Rating Post-Intervention 2: 0/10      Objective:     Communicated with pt prior to session.  Patient found supine with blood pressure cuff, rodriguez catheter, oxygen, PEG Tube, telemetry, ventilator, peripheral IV upon PT entry to room.     General Precautions: Standard,    Orthopedic Precautions:    Braces: N/A  Respiratory Status: Ventilator     Functional Mobility:  Bed Mobility:     Rolling Left:   unable  Rolling Right: unable      AM-PAC 6 CLICK MOBILITY  Turning over in bed (including adjusting bedclothes, sheets and blankets)?: 1  Sitting down on and standing up from a chair with arms (e.g., wheelchair, bedside commode, etc.): 1  Moving from lying on back to sitting on the side of the bed?: 1  Moving to and from a bed to a chair (including a wheelchair)?: 1  Need to walk in hospital room?: 1  Climbing 3-5 steps with a railing?: 1  Basic Mobility Total Score: 6       Treatment & Education:  PTA performed x 15 min PROM of the following BLE exercises to promote increased ROM, increased blood flow to enhance circulation: heel slides, hip ADD/ABD, PF/DF, SLRs, HS and PF/DF stretching.  Pt was unable to communicate throughout Tx, with no facial grimacing present, remains unable to track therapist with eyes with voice commands.     Patient left supine with all lines intact and pt mother present..    GOALS:   Multidisciplinary Problems       Physical Therapy Goals          Problem: Physical Therapy    Goal Priority Disciplines Outcome Goal Variances Interventions   Physical Therapy Goal     PT, PT/OT Ongoing, Not Progressing     Description: ST. Patient will follow one step command for Active Range of Motion of extremities to allow active participation in therapeutic intervention.       LTG- to be established if patient is able to participate in therapy.                        Time Tracking:     PT Received On: 23  PT Start Time: 1220     PT Stop Time: 1235  PT Total Time (min): 15 min     Billable Minutes: Therapeutic Exercise 15    Treatment Type: Treatment  PT/PTA: PTA     Number of PTA visits since last PT visit: 2023

## 2023-11-14 NOTE — PT/OT/SLP PROGRESS
Occupational Therapy   Treatment    Name: Blue Abdul  MRN: [id number]  Admitting Diagnosis:  Acute hypercapnic respiratory failure       Recommendations:     Discharge Recommendations: Low Intensity Therapy  Discharge Equipment Recommendations:  wheelchair, hospital bed, lift device  Barriers to discharge:       Assessment:     Blue Abdul is a 33 y.o. male with a medical diagnosis of Acute hypercapnic respiratory failure.  He presents with the following Performance deficits affecting function are weakness, decreased upper extremity function, impaired endurance, impaired balance, decreased safety awareness, impaired self care skills, impaired functional mobility, decreased coordination, abnormal tone, impaired coordination, decreased ROM.     Rehab Prognosis:  Fair and Poor; patient would benefit from acute skilled OT services to address these deficits and reach maximum level of function.       Plan:     Patient to be seen 5 x/week to address the above listed problems via self-care/home management, therapeutic activities, therapeutic exercises  Plan of Care Expires: 11/17/23  Plan of Care Reviewed with: mother    Subjective   Patient was laying supine in bed upon TOBIAS arrival. Patient mom was in room throughout treatment. Patient is unable to respond and track with eyes when talked to. TOBIAS completed PROM on BUE today for treatment.   Pain/Comfort:  Pain Rating 1: 0/10 (unable to voice pain with no grimace of any pain present)    Objective:     Communicated with: nursing staff prior to session.  Patient found HOB elevated with blood pressure cuff, rodriguez catheter, oxygen, pulse ox (continuous), PEG Tube, telemetry, Tracheostomy, ventilator, peripheral IV, SCD upon OT entry to room.    General Precautions: Standard, contact, fall    Orthopedic Precautions:N/A  Braces: N/A  Respiratory Status: Ventilator    Therapeutic exercises:  Patient completed the following PROM exercises on right and left upper  extremities  to work on ROM/strengthening in preparation to complete of bed mobility, ADLs and transfers:     -Elbow flexion/extension: 2 sets of 10   -Shoulder flexion: 2 sets of 10   -Chest press: 2 sets of 10   -Internal Rotation/External Rotation: 2 sets of 10   -Pronation/Supination: 2 sets of 10    Increased time/effort needed to complete each exercise.       Patient left HOB elevated with all lines intact, call button in reach, RN notified, and mom present    GOALS:   Multidisciplinary Problems       Occupational Therapy Goals          Problem: Occupational Therapy    Goal Priority Disciplines Outcome Interventions   Occupational Therapy Goal     OT, PT/OT Ongoing, Progressing    Description: ST. Pt will visually track from L<>R sides   2. Patient will follow one step command for Active Range of Motion of extremities to allow active participation in therapeutic intervention.       LTG  to be established if patient is able to participate in therapy.                        Time Tracking:     OT Date of Treatment: 23  OT Start Time: 1240  OT Stop Time: 1255  OT Total Time (min): 15 min    Billable Minutes:Therapeutic Exercise 15 minutes    OT/PARDEEP: PARDEEP     Number of PARDEEP visits since last OT visit: 2    PARDEEP Cosby  2023  2:27 PM

## 2023-11-14 NOTE — PT/OT/SLP PROGRESS
Physical Therapy Treatment    Patient Name:  Blue Abdul   MRN:  [id number]    Recommendations:     Discharge Recommendations: Low Intensity Therapy  Discharge Equipment Recommendations: hospital bed, lift device  Barriers to discharge: None    Assessment:     Blue Abdul is a 33 y.o. male admitted with a medical diagnosis of Acute hypercapnic respiratory failure.  He presents with the following impairments/functional limitations: weakness, impaired self care skills, impaired functional mobility, impaired cognition, impaired cardiopulmonary response to activity .    Rehab Prognosis: Good; patient would benefit from acute skilled PT services to address these deficits and reach maximum level of function.    Recent Surgery: * No surgery found *      Plan:     During this hospitalization, patient to be seen 5 x/week to address the identified rehab impairments via therapeutic exercises, therapeutic activities and progress toward the following goals:    Plan of Care Expires:  11/17/23    Subjective     Chief Complaint: N/A  Patient/Family Comments/goals:   Pain/Comfort:         Objective:     Communicated with nursing staff and mother prior to session.  Patient found supine with   upon PT entry to room.     General Precautions: Standard,    Orthopedic Precautions:    Braces: N/A  Respiratory Status: Ventilator     Functional Mobility:  Bed Mobility:     Rolling Left:  total assistance  Rolling Right: total assistance  Supine to Sit: total assistance  Transfers:     Sit to Stand: Unable    AM-PAC 6 CLICK MOBILITY          Treatment & Education:  Therapist facilitated PROM of all joints of LE to maintain joint integrity and improve blood flow. Patient tolerated treatment well    Patient left supine with  mother present..    GOALS:   Multidisciplinary Problems       Physical Therapy Goals          Problem: Physical Therapy    Goal Priority Disciplines Outcome Goal Variances Interventions   Physical Therapy  Goal     PT, PT/OT Ongoing, Not Progressing     Description: ST. Patient will follow one step command for Active Range of Motion of extremities to allow active participation in therapeutic intervention.       LTG- to be established if patient is able to participate in therapy.                        Time Tracking:     PT Received On: 23  PT Start Time: 1400     PT Stop Time: 1425  PT Total Time (min): 25 min

## 2023-11-14 NOTE — CARE UPDATE
0000 Placed patient back on simv rate of 8 vt 450 peep of 5 PS of 12 @ 40%. Patient did 4 hour avaps trial with no complications.  RR 36 Sats 95%. CH crt

## 2023-11-14 NOTE — PROGRESS NOTES
Wt: 166# ST rec [facility] diet with chopped meats and gravy. Meal intake ~60%.  RDN took Ensure Clear for pt to try and she liked it.  Noted new order for Nitrofurantoin 111/8 R/T UTI.  BUN 21, crt 1.1.  RDN enc intake.  Rec 1. Ensure Clear one carton po daily.

## 2023-11-14 NOTE — RESPIRATORY THERAPY
0745  Switched patient from SIMV to AVAPS for a trial.  Patient's vitals were: Heart rate 79, Respirations 10, %  with FIO2 of 40%      1320  Switched patient from AVAPS to SIMV.  Patient's vital signs were within the normal range.  Patient was asleep and resting comfortably.

## 2023-11-15 PROBLEM — G93.41 ENCEPHALOPATHY, METABOLIC: Status: ACTIVE | Noted: 2023-11-15

## 2023-11-15 LAB
GLUCOSE SERPL-MCNC: 144 MG/DL (ref 70–105)
GLUCOSE SERPL-MCNC: 159 MG/DL (ref 70–105)
GLUCOSE SERPL-MCNC: 180 MG/DL (ref 70–105)

## 2023-11-15 PROCEDURE — 25000003 PHARM REV CODE 250: Performed by: INTERNAL MEDICINE

## 2023-11-15 PROCEDURE — 27100108

## 2023-11-15 PROCEDURE — 97110 THERAPEUTIC EXERCISES: CPT | Mod: CO

## 2023-11-15 PROCEDURE — 94003 VENT MGMT INPAT SUBQ DAY: CPT

## 2023-11-15 PROCEDURE — 82962 GLUCOSE BLOOD TEST: CPT

## 2023-11-15 PROCEDURE — 63600175 PHARM REV CODE 636 W HCPCS: Performed by: INTERNAL MEDICINE

## 2023-11-15 PROCEDURE — 25000242 PHARM REV CODE 250 ALT 637 W/ HCPCS: Performed by: INTERNAL MEDICINE

## 2023-11-15 PROCEDURE — 94640 AIRWAY INHALATION TREATMENT: CPT

## 2023-11-15 PROCEDURE — 94761 N-INVAS EAR/PLS OXIMETRY MLT: CPT

## 2023-11-15 PROCEDURE — 27000221 HC OXYGEN, UP TO 24 HOURS

## 2023-11-15 PROCEDURE — 99309 SBSQ NF CARE MODERATE MDM 30: CPT | Mod: ,,, | Performed by: INTERNAL MEDICINE

## 2023-11-15 PROCEDURE — 99900035 HC TECH TIME PER 15 MIN (STAT)

## 2023-11-15 PROCEDURE — 11000004 HC SNF PRIVATE

## 2023-11-15 PROCEDURE — 25000003 PHARM REV CODE 250: Performed by: REGISTERED NURSE

## 2023-11-15 PROCEDURE — 97110 THERAPEUTIC EXERCISES: CPT | Mod: CQ

## 2023-11-15 PROCEDURE — 99900026 HC AIRWAY MAINTENANCE (STAT)

## 2023-11-15 RX ORDER — LINEZOLID 2 MG/ML
600 INJECTION, SOLUTION INTRAVENOUS
Status: COMPLETED | OUTPATIENT
Start: 2023-11-15 | End: 2023-11-18

## 2023-11-15 RX ORDER — MUPIROCIN 20 MG/G
OINTMENT TOPICAL 2 TIMES DAILY
Status: COMPLETED | OUTPATIENT
Start: 2023-11-15 | End: 2023-11-20

## 2023-11-15 RX ADMIN — LEVETIRACETAM 500 MG: 500 SOLUTION ORAL at 08:11

## 2023-11-15 RX ADMIN — IPRATROPIUM BROMIDE AND ALBUTEROL SULFATE 3 ML: 2.5; .5 SOLUTION RESPIRATORY (INHALATION) at 07:11

## 2023-11-15 RX ADMIN — PANTOPRAZOLE SODIUM 40 MG: 40 GRANULE, DELAYED RELEASE ORAL at 08:11

## 2023-11-15 RX ADMIN — APIXABAN 5 MG: 2.5 TABLET, FILM COATED ORAL at 08:11

## 2023-11-15 RX ADMIN — MUPIROCIN: 20 OINTMENT TOPICAL at 08:11

## 2023-11-15 RX ADMIN — LINEZOLID 600 MG: 600 INJECTION, SOLUTION INTRAVENOUS at 03:11

## 2023-11-15 NOTE — CARE UPDATE
Patient completed first 6 hour AVAPS trial.  HR 97, R R 21, o2 sat 91% on 40% vent.  O2 sat incr. To 95% once mode was switched to SIMV. Will do one more six hour trial ramakrishna.

## 2023-11-15 NOTE — ASSESSMENT & PLAN NOTE
Accuchecks are ok  Currently on no medications  Can monitor blood sugar and if needed add sliding scale

## 2023-11-15 NOTE — PROGRESS NOTES
[facility] - Medical Surgical Unit  Pulmonology  Progress Note    Patient Name: Blue Abdul  MRN: [id number]  Admission Date: 11/9/2023  Hospital Length of Stay: 6 days  Code Status: Full Code  Attending Provider: Ramiro Flores MD  Primary Care Provider: Nati Doyle FNP   Principal Problem: Acute hypercapnic respiratory failure    Subjective:     Interval History: No acute events. Currently resting comfortably. Opens eyes spontaneously but does not obey any commands. Afebrile today and vital signs are stable.      Objective:     Vital Signs (Most Recent):  Temp: 99.8 °F (37.7 °C) (11/15/23 1230)  Pulse: 92 (11/15/23 1230)  Resp: (!) 26 (11/15/23 1230)  BP: (!) 113/59 (11/15/23 1230)  SpO2: 100 % (11/15/23 1230) Vital Signs (24h Range):  Temp:  [98.8 °F (37.1 °C)-99.9 °F (37.7 °C)] 99.8 °F (37.7 °C)  Pulse:  [] 92  Resp:  [8-44] 26  SpO2:  [96 %-100 %] 100 %  BP: ()/(53-71) 113/59     Weight: 91.6 kg (202 lb)  Body mass index is 40.8 kg/m².      Intake/Output Summary (Last 24 hours) at 11/15/2023 1419  Last data filed at 11/14/2023 1809  Gross per 24 hour   Intake 909 ml   Output --   Net 909 ml        Physical Exam  Vitals reviewed.   Constitutional:       General: He is not in acute distress.     Appearance: He is ill-appearing.      Interventions: He is not intubated.  HENT:      Head: Normocephalic and atraumatic.      Right Ear: External ear normal.      Left Ear: External ear normal.      Nose: Nose normal.      Mouth/Throat:      Mouth: Mucous membranes are dry.      Pharynx: Oropharynx is clear.      Comments: Trach in place  Eyes:      Extraocular Movements: Extraocular movements intact.      Conjunctiva/sclera: Conjunctivae normal.      Pupils: Pupils are equal, round, and reactive to light.   Neck:      Comments: trach  Cardiovascular:      Rate and Rhythm: Normal rate.      Heart sounds: Normal heart sounds. No murmur heard.  Pulmonary:      Effort: Pulmonary  effort is normal. He is not intubated.      Breath sounds: Rhonchi present. No wheezing or rales.   Abdominal:      General: Abdomen is flat. Bowel sounds are normal.      Palpations: Abdomen is soft.      Comments: peg   Musculoskeletal:         General: Normal range of motion.      Cervical back: Neck supple.      Right lower leg: No edema.      Left lower leg: No edema.   Skin:     General: Skin is warm and dry.      Capillary Refill: Capillary refill takes less than 2 seconds.      Coloration: Skin is not pale.   Neurological:      Mental Status: He is alert.      Comments: Opens eyes spontaneously but does not obey any commands   Psychiatric:         Behavior: Behavior normal.           Review of Systems    Vents:  Vent Mode: AVAPS AE (11/15/23 1150)  Ventilator Initiated: Yes (11/14/23 1802)  Set Rate: 0 BPM (11/15/23 1150)  Vt Set: 450 mL (11/15/23 1150)  Pressure Support: 12 cmH20 (11/15/23 0940)  PEEP/CPAP: 5 cmH20 (11/15/23 0940)  Oxygen Concentration (%): 40 (11/15/23 1230)  Peak Airway Pressure: 15.1 cmH20 (11/15/23 1150)  Total Ve: 11.4 L/m (11/15/23 0511)  F/VT Ratio<105 (RSBI): (!) 85.55 (11/15/23 1150)    Lines/Drains/Airways       Drain  Duration                  Gastrostomy/Enterostomy midline -- days              Airway  Duration             Adult Surgical Airway Portex 8.0 mm DIC cuffed -- days                    Significant Labs:    CBC/Anemia Profile:  Recent Labs   Lab 11/14/23  0550   WBC 6.94   HGB 8.7*   HCT 29.3*   *   MCV 92.7   RDW 16.3*        Chemistries:  Recent Labs   Lab 11/14/23  0550      K 3.8      CO2 26   BUN 7   CREATININE 0.64*   CALCIUM 8.8       All pertinent labs within the past 24 hours have been reviewed.    Significant Imaging:  I have reviewed all pertinent imaging results/findings within the past 24 hours.  Assessment/Plan:     Neuro  Encephalopathy, metabolic  Opens eyes but does not do anything to command  Likely component of anoxic  encephalopathy  He also had low blood sugar which could have contributed    Seizures  No recent seizure activity  Patient is on keppra 500 mg bid    Pulmonary  * Acute hypercapnic respiratory failure  Currently on AVAPS 6 hours bid and doing ok  Continue to wean as tolerated  Sputum culture with heavy growth MRSA so Started linezolid bid for 7 days    Endocrine  Diabetes mellitus  Accuchecks are ok  Currently on no medications  Can monitor blood sugar and if needed add sliding scale                 Craig Estrada, DO  Pulmonology  [facility] - [facility]

## 2023-11-15 NOTE — PT/OT/SLP PROGRESS
Physical Therapy Treatment    Patient Name:  Blue Abdul   MRN:  [id number]    Recommendations:     Discharge Recommendations: Low Intensity Therapy  Discharge Equipment Recommendations: hospital bed, lift device  Barriers to discharge: Decreased caregiver support    Assessment:     Blue Abdul is a 33 y.o. male admitted with a medical diagnosis of Acute hypercapnic respiratory failure.  He presents with the following impairments/functional limitations: weakness, impaired functional mobility, impaired cognition, decreased safety awareness, impaired coordination, impaired endurance, gait instability, decreased coordination, impaired fine motor, impaired sensation, impaired balance, impaired self care skills, visual deficits, decreased lower extremity function .    Pt was supine, pt unable to voice pain , pt presented no facial grimacing.  Pt tolerated tx poor, unable to perform any AROM or track therapist with eyes with voice cues.     Rehab Prognosis: Poor; patient would benefit from acute skilled PT services to address these deficits and reach maximum level of function.    Recent Surgery: * No surgery found *      Plan:     During this hospitalization, patient to be seen 5 x/week to address the identified rehab impairments via therapeutic exercises and progress toward the following goals:    Plan of Care Expires:  11/17/23    Subjective     Chief Complaint: no pain voiced, no facial grimacing present  Patient/Family Comments/goals: increased BLE ROM  Pain/Comfort:         Objective:     Communicated with pt prior to session.  Patient found supine with blood pressure cuff, rodriguez catheter, oxygen, PEG Tube, telemetry, ventilator, peripheral IV upon PT entry to room.     General Precautions: Standard,    Orthopedic Precautions:    Braces: N/A  Respiratory Status: Ventilator     Functional Mobility:  Bed Mobility:     Rolling Left:  unable  Rolling Right: unable      AM-PAC 6 CLICK MOBILITY           Treatment & Education:  PTA performed x 15 min PROM of the following BLE exercises to promote increased ROM, increased blood flow to enhance circulation: heel slides, hip ADD/ABD, PF/DF, SLRs, HS and PF/DF stretching.  Pt was unable to communicate throughout Tx, with no facial grimacing present, remains unable to track therapist with eyes with voice commands.     Patient left supine with all lines intact and pt mother present..    GOALS:   Multidisciplinary Problems       Physical Therapy Goals          Problem: Physical Therapy    Goal Priority Disciplines Outcome Goal Variances Interventions   Physical Therapy Goal     PT, PT/OT Ongoing, Not Progressing     Description: ST. Patient will follow one step command for Active Range of Motion of extremities to allow active participation in therapeutic intervention.       LTG- to be established if patient is able to participate in therapy.                        Time Tracking:     PT Received On: 11/15/23  PT Start Time: 812     PT Stop Time: 824  PT Total Time (min): 12 min     Billable Minutes: Therapeutic Exercise 12    Treatment Type: Treatment  PT/PTA: PTA     Number of PTA visits since last PT visit: 2     11/15/2023

## 2023-11-15 NOTE — PT/OT/SLP PROGRESS
Occupational Therapy   Treatment    Name: Blue Abdul  MRN: [id number]  Admitting Diagnosis:  Acute hypercapnic respiratory failure       Recommendations:     Discharge Recommendations: Low Intensity Therapy  Discharge Equipment Recommendations:  hospital bed, lift device, wheelchair  Barriers to discharge:       Assessment:     Blue Abdul is a 33 y.o. male with a medical diagnosis of Acute hypercapnic respiratory failure.  He presents with the following Performance deficits affecting function are weakness, decreased upper extremity function, impaired endurance, impaired balance, decreased ROM, decreased safety awareness, impaired self care skills, impaired functional mobility, decreased coordination.     Rehab Prognosis:  Fair and Poor; patient would benefit from acute skilled OT services to address these deficits and reach maximum level of function.       Plan:     Patient to be seen 5 x/week to address the above listed problems via self-care/home management, therapeutic activities, therapeutic exercises  Plan of Care Expires: 11/17/23  Plan of Care Reviewed with: mother    Subjective   Patient was laying supine in bed upon TOBIAS arrival. Patient is unable to respond and track with eyes when talked to. TOBIAS completed PROM on BUE today for treatment.   Pain/Comfort:  Pain Rating 1: 0/10    Objective:     Communicated with: nursing staff prior to session.  Patient found HOB elevated with blood pressure cuff, rodriguez catheter, oxygen, pulse ox (continuous), PEG Tube, SCD, telemetry, Tracheostomy, ventilator, peripheral IV upon OT entry to room.    General Precautions: Standard, fall, contact    Orthopedic Precautions:N/A  Braces: N/A  Respiratory Status: Ventilator    Therapeutic exercises:  Patient completed the following PROM exercises on right and left upper extremities  to work on ROM/strengthening in preparation to complete of bed mobility, ADLs and transfers:     -Elbow flexion/extension: 2 sets  of 10   -Shoulder flexion: 2 sets of 10   -Chest press: 2 sets of 10   -Internal Rotation/External Rotation: 2 sets of 10   -Pronation/Supination: 2 sets of 10    Increased time/effort needed to complete each exercise.       Patient left HOB elevated with all lines intact, call button in reach, and RN notified    GOALS:   Multidisciplinary Problems       Occupational Therapy Goals          Problem: Occupational Therapy    Goal Priority Disciplines Outcome Interventions   Occupational Therapy Goal     OT, PT/OT Ongoing, Progressing    Description: ST. Pt will visually track from L<>R sides   2. Patient will follow one step command for Active Range of Motion of extremities to allow active participation in therapeutic intervention.       LTG  to be established if patient is able to participate in therapy.                        Time Tracking:     OT Date of Treatment: 11/15/23  OT Start Time: 48  OT Stop Time: 1003  OT Total Time (min): 15 min    Billable Minutes:Therapeutic Exercise 15 minutes    OT/PARDEEP: PARDEEP     Number of PARDEEP visits since last OT visit: 3    PARDEEP Cosby  11/15/2023  4:15 PM

## 2023-11-15 NOTE — PLAN OF CARE
Patient has eyes open but does not try to respond.  HR 99, RR 44, o2 sat 99% on 40% on vent. Patient has bilateral rhonci and coarseness.    Problem: Communication Impairment (Mechanical Ventilation, Invasive)  Goal: Effective Communication  Outcome: Ongoing, Progressing  Intervention: Ensure Effective Communication  Flowsheets (Taken 11/15/2023 0741)  Communication Enhancement Strategies:   call light answered in person   family involved in communication plan   family/caregiver assisted with communication     Problem: Device-Related Complication Risk (Mechanical Ventilation, Invasive)  Goal: Optimal Device Function  Outcome: Ongoing, Progressing  Intervention: Optimize Device Care and Function  Flowsheets (Taken 11/15/2023 0741)  Airway Safety Measures:   manual resuscitator/mask at bedside   suction at bedside     Problem: Inability to Wean (Mechanical Ventilation, Invasive)  Goal: Mechanical Ventilation Liberation  Outcome: Ongoing, Progressing  Intervention: Promote Extubation and Mechanical Ventilation Liberation  Flowsheets (Taken 11/15/2023 0741)  Sleep/Rest Enhancement: family presence promoted  Environmental Support:   calm environment promoted   caregiver consistency promoted   rest periods encouraged   rooming-in facilitated     Problem: Skin and Tissue Injury (Mechanical Ventilation, Invasive)  Goal: Absence of Device-Related Skin and Tissue Injury  Outcome: Ongoing, Progressing  Intervention: Maintain Skin and Tissue Health  Flowsheets (Taken 11/15/2023 0741)  Device Skin Pressure Protection: absorbent pad utilized/changed     Problem: Ventilator-Induced Lung Injury (Mechanical Ventilation, Invasive)  Goal: Absence of Ventilator-Induced Lung Injury  Outcome: Ongoing, Progressing  Intervention: Facilitate Lung-Protection Measures  Flowsheets (Taken 11/15/2023 0741)  Lung Protection Measures:   low tidal volume provided   lung compliance monitored   optimal PEEP applied   plateau/inspiratory pressure  monitored   ventilator synchrony promoted   low inspiratory pressure provided  Intervention: Prevent Ventilator-Associated Pneumonia  Flowsheets (Taken 11/15/2023 0741)  Head of Bed (HOB) Positioning: HOB at 30-45 degrees  Oral Care:   suction provided   swabbed with antiseptic solution     Problem: Communication Impairment (Artificial Airway)  Goal: Effective Communication  Outcome: Ongoing, Progressing  Intervention: Ensure Effective Communication  Flowsheets (Taken 11/15/2023 0741)  Communication Enhancement Strategies:   call light answered in person   family involved in communication plan   family/caregiver assisted with communication  Family/Support System Care:   caregiver stress acknowledged   involvement promoted   presence promoted  Trust Relationship/Rapport: care explained     Problem: Device-Related Complication Risk (Artificial Airway)  Goal: Optimal Device Function  Outcome: Ongoing, Progressing  Intervention: Optimize Device Care and Function  Flowsheets (Taken 11/15/2023 0741)  Airway/Ventilation Management:   airway patency maintained   calming measures promoted   pulmonary hygiene promoted   humidification applied  Airway Safety Measures:   manual resuscitator/mask at bedside   suction at bedside  Oral Care:   suction provided   swabbed with antiseptic solution     Problem: Skin and Tissue Injury (Artificial Airway)  Goal: Absence of Device-Related Skin or Tissue Injury  Outcome: Ongoing, Progressing  Intervention: Maintain Skin and Tissue Health  Flowsheets (Taken 11/15/2023 0741)  Device Skin Pressure Protection: absorbent pad utilized/changed     Problem: Noninvasive Ventilation Acute  Goal: Effective Unassisted Ventilation and Oxygenation  Outcome: Ongoing, Progressing  Intervention: Monitor and Manage Noninvasive Ventilation  Flowsheets (Taken 11/15/2023 0741)  Airway/Ventilation Management:   airway patency maintained   calming measures promoted   pulmonary hygiene promoted   humidification  applied

## 2023-11-15 NOTE — CARE UPDATE
0052 Placed patient back on simv rate of 8 vt 450 peep of 5 PS of 12 @ 40%. Patient did 5 hour avaps trial with no complications. HR 98 RR 32 Sats 100%. CH crt

## 2023-11-15 NOTE — SUBJECTIVE & OBJECTIVE
Interval History: No acute events. Currently resting comfortably. Opens eyes spontaneously but does not obey any commands. Afebrile today and vital signs are stable.      Objective:     Vital Signs (Most Recent):  Temp: 99.8 °F (37.7 °C) (11/15/23 1230)  Pulse: 92 (11/15/23 1230)  Resp: (!) 26 (11/15/23 1230)  BP: (!) 113/59 (11/15/23 1230)  SpO2: 100 % (11/15/23 1230) Vital Signs (24h Range):  Temp:  [98.8 °F (37.1 °C)-99.9 °F (37.7 °C)] 99.8 °F (37.7 °C)  Pulse:  [] 92  Resp:  [8-44] 26  SpO2:  [96 %-100 %] 100 %  BP: ()/(53-71) 113/59     Weight: 91.6 kg (202 lb)  Body mass index is 40.8 kg/m².      Intake/Output Summary (Last 24 hours) at 11/15/2023 1419  Last data filed at 11/14/2023 1809  Gross per 24 hour   Intake 909 ml   Output --   Net 909 ml        Physical Exam  Vitals reviewed.   Constitutional:       General: He is not in acute distress.     Appearance: He is ill-appearing.      Interventions: He is not intubated.  HENT:      Head: Normocephalic and atraumatic.      Right Ear: External ear normal.      Left Ear: External ear normal.      Nose: Nose normal.      Mouth/Throat:      Mouth: Mucous membranes are dry.      Pharynx: Oropharynx is clear.      Comments: Trach in place  Eyes:      Extraocular Movements: Extraocular movements intact.      Conjunctiva/sclera: Conjunctivae normal.      Pupils: Pupils are equal, round, and reactive to light.   Neck:      Comments: trach  Cardiovascular:      Rate and Rhythm: Normal rate.      Heart sounds: Normal heart sounds. No murmur heard.  Pulmonary:      Effort: Pulmonary effort is normal. He is not intubated.      Breath sounds: Rhonchi present. No wheezing or rales.   Abdominal:      General: Abdomen is flat. Bowel sounds are normal.      Palpations: Abdomen is soft.      Comments: peg   Musculoskeletal:         General: Normal range of motion.      Cervical back: Neck supple.      Right lower leg: No edema.      Left lower leg: No edema.    Skin:     General: Skin is warm and dry.      Capillary Refill: Capillary refill takes less than 2 seconds.      Coloration: Skin is not pale.   Neurological:      Mental Status: He is alert.      Comments: Opens eyes spontaneously but does not obey any commands   Psychiatric:         Behavior: Behavior normal.           Review of Systems    Vents:  Vent Mode: AVAPS AE (11/15/23 1150)  Ventilator Initiated: Yes (11/14/23 1802)  Set Rate: 0 BPM (11/15/23 1150)  Vt Set: 450 mL (11/15/23 1150)  Pressure Support: 12 cmH20 (11/15/23 0940)  PEEP/CPAP: 5 cmH20 (11/15/23 0940)  Oxygen Concentration (%): 40 (11/15/23 1230)  Peak Airway Pressure: 15.1 cmH20 (11/15/23 1150)  Total Ve: 11.4 L/m (11/15/23 0511)  F/VT Ratio<105 (RSBI): (!) 85.55 (11/15/23 1150)    Lines/Drains/Airways       Drain  Duration                  Gastrostomy/Enterostomy midline -- days              Airway  Duration             Adult Surgical Airway Portex 8.0 mm DIC cuffed -- days                    Significant Labs:    CBC/Anemia Profile:  Recent Labs   Lab 11/14/23  0550   WBC 6.94   HGB 8.7*   HCT 29.3*   *   MCV 92.7   RDW 16.3*        Chemistries:  Recent Labs   Lab 11/14/23  0550      K 3.8      CO2 26   BUN 7   CREATININE 0.64*   CALCIUM 8.8       All pertinent labs within the past 24 hours have been reviewed.    Significant Imaging:  I have reviewed all pertinent imaging results/findings within the past 24 hours.

## 2023-11-15 NOTE — CARE UPDATE
[facility] - Medical Surgical Unit - Swing Bed   Interdisciplinary Team Meeting    Patient: Blue Abdul   Today's Date: 11/15/2023   Estimated D/C Date:         Physician: Ramiro Flores MD Nurse Practitioner:  NIDA   Pharmacy:  [facility] Unit Director: Holly Carrillo   : Anita Krause Physical/Occupational Therapy: Jadon Mark   Speech Therapy:  Monica Anderson Activity Therapy: NIDA   Nursing: Anita Krause  Respiratory: Tona Beckwith Dietary: Kuldeep Boggs  Other: NA     Nurse  New Symptoms/Problems: NA  Last Bowel Movement: 11/13/23   Urine: incontinent  Rojas: Yes  Bowel: incontinent   Constipated: No  Diarrhea: No   Isolation: Yes  Wound Care: No  Wound Location/Tx: NA  Cognition: patient unresponsive  Aspiration Precautions: Yes  Comment(s): NA    Respiratory   O2 Device:  AVAPS WITH WEANING PROTOCOL . RESTINAG WITH SIMV  O2 Flow:   SpO2: 100%  Neb Tx: No  Comment(s): NA     Dietary  Nutrition: NPO  Comment(s): PEPTAMEN AF @ 50 CC WITH 35 CC H20 FLUSH Q 1 HOUR     Speech Therapy  Speech/Swallowing:  NPO UNRESPONSIVE   Comment(s): NA    Physical Therapy  Gait/Assistive Device: NA ELOS:CONT    Transfers: Activity did not occur  Bed Mobility: Total Assistance Range of Motion/Restrictions: NA  Comment(s): NA     Occupational Therapy  Eating/Grooming: Total Assistance Toileting: Total Assistance   Bathing: Total Assistance Dressing (Upper Body): Total Assistance   Dressing (Lower Body): Total Assistance Comment(s): NA     Pharmacy  Medication Changes (see all MD orders in chart): Yes  Labs Reviewed: Yes  New Lab Orders: Yes  Comment(s): INCREASED PROTONIX TO BID       Tx Plan/Recommendations reviewed with family and/or patient on (date) 11/15/23.  Additional family Conference/Training: NIDA  D/C Plan/Recommendations:  HOMEWITH FAMILY AND HOME HEALTH    CRICKET:   Comment(s): NIDA

## 2023-11-15 NOTE — ASSESSMENT & PLAN NOTE
Currently on AVAPS 6 hours bid and doing ok  Continue to wean as tolerated  Sputum culture with heavy growth MRSA so Started linezolid bid for 7 days

## 2023-11-15 NOTE — HOSPITAL COURSE
11/15-Currently no acute issues. No seizure activity noted. He is on AVAPS 6 hours bid and doing ok. Sputum culture with MRSA so started on antibiotics  11/22- Continues to do well from a respiratory standpoint. No significant change neurologically  12/13- much more awake and alert. Does not obey any commands for me. On trach collar and doing well.  12/19- having some purulent sputum/secretions from trach. Cultures pending. Doing well with trials  1/9- he is on trach collar and doing ok. Not tolerating capping  1/17- No significant changes. Mother is learning trach care

## 2023-11-15 NOTE — ASSESSMENT & PLAN NOTE
Opens eyes but does not do anything to command  Likely component of anoxic encephalopathy  He also had low blood sugar which could have contributed

## 2023-11-16 LAB
GLUCOSE SERPL-MCNC: 130 MG/DL (ref 70–105)
GLUCOSE SERPL-MCNC: 133 MG/DL (ref 70–105)
GLUCOSE SERPL-MCNC: 150 MG/DL (ref 70–105)
GLUCOSE SERPL-MCNC: 157 MG/DL (ref 70–105)

## 2023-11-16 PROCEDURE — 99900035 HC TECH TIME PER 15 MIN (STAT)

## 2023-11-16 PROCEDURE — 94003 VENT MGMT INPAT SUBQ DAY: CPT

## 2023-11-16 PROCEDURE — 63600175 PHARM REV CODE 636 W HCPCS: Performed by: INTERNAL MEDICINE

## 2023-11-16 PROCEDURE — 97110 THERAPEUTIC EXERCISES: CPT | Mod: CO

## 2023-11-16 PROCEDURE — 97140 MANUAL THERAPY 1/> REGIONS: CPT

## 2023-11-16 PROCEDURE — 27000940

## 2023-11-16 PROCEDURE — 25000003 PHARM REV CODE 250: Performed by: REGISTERED NURSE

## 2023-11-16 PROCEDURE — 11000004 HC SNF PRIVATE

## 2023-11-16 PROCEDURE — 82962 GLUCOSE BLOOD TEST: CPT

## 2023-11-16 PROCEDURE — 94761 N-INVAS EAR/PLS OXIMETRY MLT: CPT

## 2023-11-16 PROCEDURE — 27000958

## 2023-11-16 PROCEDURE — 27000221 HC OXYGEN, UP TO 24 HOURS

## 2023-11-16 PROCEDURE — 25000003 PHARM REV CODE 250: Performed by: INTERNAL MEDICINE

## 2023-11-16 PROCEDURE — 25000242 PHARM REV CODE 250 ALT 637 W/ HCPCS: Performed by: INTERNAL MEDICINE

## 2023-11-16 PROCEDURE — 99900026 HC AIRWAY MAINTENANCE (STAT)

## 2023-11-16 PROCEDURE — 94640 AIRWAY INHALATION TREATMENT: CPT

## 2023-11-16 RX ADMIN — LINEZOLID 600 MG: 600 INJECTION, SOLUTION INTRAVENOUS at 02:11

## 2023-11-16 RX ADMIN — SODIUM CHLORIDE 250 ML: 9 INJECTION, SOLUTION INTRAVENOUS at 03:11

## 2023-11-16 RX ADMIN — PANTOPRAZOLE SODIUM 40 MG: 40 GRANULE, DELAYED RELEASE ORAL at 08:11

## 2023-11-16 RX ADMIN — APIXABAN 5 MG: 2.5 TABLET, FILM COATED ORAL at 08:11

## 2023-11-16 RX ADMIN — LEVETIRACETAM 500 MG: 500 SOLUTION ORAL at 08:11

## 2023-11-16 RX ADMIN — MUPIROCIN: 20 OINTMENT TOPICAL at 08:11

## 2023-11-16 RX ADMIN — IPRATROPIUM BROMIDE AND ALBUTEROL SULFATE 3 ML: 2.5; .5 SOLUTION RESPIRATORY (INHALATION) at 07:11

## 2023-11-16 RX ADMIN — LINEZOLID 600 MG: 600 INJECTION, SOLUTION INTRAVENOUS at 03:11

## 2023-11-16 NOTE — CARE UPDATE
Patient successfully completed first 7 hr. Trial.  Vitals stable at , RR 36, and o2 sat 99% on 40 % vent.

## 2023-11-16 NOTE — PLAN OF CARE
Problem: Adult Inpatient Plan of Care  Goal: Plan of Care Review  Outcome: Ongoing, Progressing     Problem: Adult Inpatient Plan of Care  Goal: Absence of Hospital-Acquired Illness or Injury  Outcome: Ongoing, Progressing     Problem: Adult Inpatient Plan of Care  Goal: Optimal Comfort and Wellbeing  Outcome: Ongoing, Progressing     Problem: Device-Related Complication Risk (Mechanical Ventilation, Invasive)  Goal: Optimal Device Function  Outcome: Ongoing, Progressing     Problem: Skin Injury Risk Increased  Goal: Skin Health and Integrity  Outcome: Ongoing, Progressing     Problem: Diabetes Comorbidity  Goal: Blood Glucose Level Within Targeted Range  Outcome: Ongoing, Progressing

## 2023-11-16 NOTE — PT/OT/SLP PROGRESS
Physical Therapy Treatment    Patient Name:  Blue Abudl   MRN:  [id number]    Recommendations:     Discharge Recommendations: Low Intensity Therapy  Discharge Equipment Recommendations: hospital bed, lift device  Barriers to discharge: None    Assessment:     Blue Abdul is a 33 y.o. male admitted with a medical diagnosis of Acute hypercapnic respiratory failure.  He presents with the following impairments/functional limitations: weakness, impaired functional mobility, impaired cognition, decreased safety awareness, impaired coordination, impaired endurance, gait instability, decreased coordination, impaired fine motor, impaired sensation, impaired balance, impaired self care skills, visual deficits, decreased lower extremity function .    Rehab Prognosis: Good; patient would benefit from acute skilled PT services to address these deficits and reach maximum level of function.    Recent Surgery: * No surgery found *      Plan:     During this hospitalization, patient to be seen 5 x/week to address the identified rehab impairments via therapeutic exercises and progress toward the following goals:    Plan of Care Expires:  11/17/23    Subjective     Chief Complaint: N/A  Patient/Family Comments/goals:   Pain/Comfort:         Objective:     Communicated with nursing staff and mother prior to session.  Patient found supine with   upon PT entry to room.     General Precautions: Standard,    Orthopedic Precautions:    Braces: N/A  Respiratory Status: Ventilator     Functional Mobility:  Bed Mobility:     Rolling Left:  total assistance  Rolling Right: total assistance  Supine to Sit: total assistance  Transfers:     Sit to Stand: Unable    AM-PAC 6 CLICK MOBILITY          Treatment & Education:  Therapist facilitated PROM of all joints of LE to maintain joint integrity and improve blood flow. Patient tolerated treatment well    Patient left supine with  mother present..    GOALS:   Multidisciplinary  Problems       Physical Therapy Goals          Problem: Physical Therapy    Goal Priority Disciplines Outcome Goal Variances Interventions   Physical Therapy Goal     PT, PT/OT Ongoing, Not Progressing     Description: ST. Patient will follow one step command for Active Range of Motion of extremities to allow active participation in therapeutic intervention.       LTG- to be established if patient is able to participate in therapy.                        Time Tracking:     PT Received On: 23  PT Start Time: 1325     PT Stop Time: 1335  PT Total Time (min): 10 min

## 2023-11-16 NOTE — PLAN OF CARE
Care plan reviewed  Problem: Adult Inpatient Plan of Care  Goal: Plan of Care Review  Outcome: Ongoing, Progressing     Problem: Adult Inpatient Plan of Care  Goal: Patient-Specific Goal (Individualized)  Outcome: Ongoing, Progressing     Problem: Adult Inpatient Plan of Care  Goal: Absence of Hospital-Acquired Illness or Injury  Outcome: Ongoing, Progressing     Problem: Adult Inpatient Plan of Care  Goal: Readiness for Transition of Care  Outcome: Ongoing, Progressing

## 2023-11-16 NOTE — PT/OT/SLP PROGRESS
Occupational Therapy   Treatment    Name: Blue Abdul  MRN: [id number]  Admitting Diagnosis:  Acute hypercapnic respiratory failure       Recommendations:     Discharge Recommendations: Low Intensity Therapy  Discharge Equipment Recommendations:  hospital bed, lift device, wheelchair  Barriers to discharge:       Assessment:     Blue Abdul is a 33 y.o. male with a medical diagnosis of Acute hypercapnic respiratory failure.  He presents with the following Performance deficits affecting function are weakness, decreased upper extremity function, impaired endurance, impaired balance, decreased safety awareness, impaired self care skills, impaired functional mobility, decreased coordination, decreased ROM, abnormal tone.     Rehab Prognosis:  Fair and Poor; patient would benefit from acute skilled OT services to address these deficits and reach maximum level of function.       Plan:     Patient to be seen 5 x/week to address the above listed problems via self-care/home management, therapeutic activities, therapeutic exercises  Plan of Care Expires: 11/17/23  Plan of Care Reviewed with: mother    Subjective   Patient was laying supine in bed upon TOBIAS arrival. Patient mom was present in room today. Patient is unable to respond and track with eyes when talked to. TOBIAS completed PROM on BUE today for treatment.   Pain/Comfort:  Pain Rating 1: 0/10    Objective:     Communicated with: nursing staff prior to session.  Patient found HOB elevated with blood pressure cuff, rodriguez catheter, pulse ox (continuous), oxygen, PEG Tube, telemetry, Tracheostomy, ventilator, peripheral IV, SCD upon OT entry to room.    General Precautions: Standard, contact, fall    Orthopedic Precautions:N/A  Braces: N/A  Respiratory Status: Ventilator    Therapeutic exercises:  Patient completed the following PROM exercises on right and left upper extremities  to work on ROM/strengthening in preparation to complete of bed mobility,  ADLs and transfers:     -Elbow flexion/extension: 2 sets of 10   -Shoulder flexion: 2 sets of 10   -Chest press: 2 sets of 10   -Internal Rotation/External Rotation: 2 sets of 10   -Shoulder abduction: 2 sets of 10    Increased time/effort needed to complete each exercise.       Patient left HOB elevated with all lines intact, call button in reach, and RN notified    GOALS:   Multidisciplinary Problems       Occupational Therapy Goals          Problem: Occupational Therapy    Goal Priority Disciplines Outcome Interventions   Occupational Therapy Goal     OT, PT/OT Ongoing, Progressing    Description: ST. Pt will visually track from L<>R sides   2. Patient will follow one step command for Active Range of Motion of extremities to allow active participation in therapeutic intervention.       LTG  to be established if patient is able to participate in therapy.                        Time Tracking:     OT Date of Treatment: 23  OT Start Time: 1350  OT Stop Time: 1405  OT Total Time (min): 15 min    Billable Minutes:Therapeutic Exercise 15 minutes    OT/PARDEEP: PARDEEP     Number of PARDEEP visits since last OT visit: 4    PARDEEP Cosby  2023  2:31 PM

## 2023-11-16 NOTE — PLAN OF CARE
Patient has eyes open but does not respond to commands.  HR 83, RR 17, o2 sat. 96% on 40% o2 on vent.    Problem: Communication Impairment (Mechanical Ventilation, Invasive)  Goal: Effective Communication  Outcome: Ongoing, Progressing  Intervention: Ensure Effective Communication  Flowsheets (Taken 11/16/2023 0806)  Communication Enhancement Strategies:   family involved in communication plan   family/caregiver assisted with communication     Problem: Device-Related Complication Risk (Mechanical Ventilation, Invasive)  Goal: Optimal Device Function  Outcome: Ongoing, Progressing  Intervention: Optimize Device Care and Function  Flowsheets (Taken 11/16/2023 0806)  Airway Safety Measures:   manual resuscitator/mask at bedside   suction at bedside     Problem: Inability to Wean (Mechanical Ventilation, Invasive)  Goal: Mechanical Ventilation Liberation  Outcome: Ongoing, Progressing  Intervention: Promote Extubation and Mechanical Ventilation Liberation  Flowsheets (Taken 11/16/2023 0806)  Environmental Support:   calm environment promoted   caregiver consistency promoted   environmental consistency promoted   personal routine supported   rest periods encouraged   rooming-in facilitated     Problem: Skin and Tissue Injury (Mechanical Ventilation, Invasive)  Goal: Absence of Device-Related Skin and Tissue Injury  Outcome: Ongoing, Progressing  Intervention: Maintain Skin and Tissue Health  Flowsheets (Taken 11/16/2023 0806)  Device Skin Pressure Protection: absorbent pad utilized/changed     Problem: Ventilator-Induced Lung Injury (Mechanical Ventilation, Invasive)  Goal: Absence of Ventilator-Induced Lung Injury  Outcome: Ongoing, Progressing  Intervention: Facilitate Lung-Protection Measures  Flowsheets (Taken 11/16/2023 0806)  Lung Protection Measures:   low inspiratory pressure provided   low tidal volume provided   lung compliance monitored   optimal PEEP applied   ventilator synchrony promoted  Intervention:  Prevent Ventilator-Associated Pneumonia  Flowsheets (Taken 11/16/2023 0806)  Head of Bed (HOB) Positioning: HOB at 30 degrees  Oral Care:   tongue brushed   teeth brushed   mouth wash rinse   suction provided     Problem: Communication Impairment (Artificial Airway)  Goal: Effective Communication  Outcome: Ongoing, Progressing  Intervention: Ensure Effective Communication  Flowsheets (Taken 11/16/2023 0806)  Communication Enhancement Strategies:   family involved in communication plan   family/caregiver assisted with communication  Trust Relationship/Rapport: care explained     Problem: Device-Related Complication Risk (Artificial Airway)  Goal: Optimal Device Function  Outcome: Ongoing, Progressing  Intervention: Optimize Device Care and Function  Flowsheets (Taken 11/16/2023 0806)  Airway/Ventilation Management:   airway patency maintained   calming measures promoted   humidification applied   pulmonary hygiene promoted  Airway Safety Measures:   manual resuscitator/mask at bedside   suction at bedside  Oral Care:   tongue brushed   teeth brushed   mouth wash rinse   suction provided     Problem: Skin and Tissue Injury (Artificial Airway)  Goal: Absence of Device-Related Skin or Tissue Injury  Outcome: Ongoing, Progressing  Intervention: Maintain Skin and Tissue Health  Flowsheets (Taken 11/16/2023 0806)  Device Skin Pressure Protection: absorbent pad utilized/changed

## 2023-11-17 LAB
GLUCOSE SERPL-MCNC: 121 MG/DL (ref 70–105)
GLUCOSE SERPL-MCNC: 139 MG/DL (ref 70–105)
GLUCOSE SERPL-MCNC: 141 MG/DL (ref 70–105)
GLUCOSE SERPL-MCNC: 158 MG/DL (ref 70–105)
GLUCOSE SERPL-MCNC: 160 MG/DL (ref 70–105)

## 2023-11-17 PROCEDURE — 82962 GLUCOSE BLOOD TEST: CPT

## 2023-11-17 PROCEDURE — 94640 AIRWAY INHALATION TREATMENT: CPT

## 2023-11-17 PROCEDURE — 27000958

## 2023-11-17 PROCEDURE — 63600175 PHARM REV CODE 636 W HCPCS: Performed by: INTERNAL MEDICINE

## 2023-11-17 PROCEDURE — 11000004 HC SNF PRIVATE

## 2023-11-17 PROCEDURE — 27000221 HC OXYGEN, UP TO 24 HOURS

## 2023-11-17 PROCEDURE — 25000003 PHARM REV CODE 250: Performed by: INTERNAL MEDICINE

## 2023-11-17 PROCEDURE — 97110 THERAPEUTIC EXERCISES: CPT | Mod: CQ

## 2023-11-17 PROCEDURE — 27000935

## 2023-11-17 PROCEDURE — 99900035 HC TECH TIME PER 15 MIN (STAT)

## 2023-11-17 PROCEDURE — 25000242 PHARM REV CODE 250 ALT 637 W/ HCPCS: Performed by: INTERNAL MEDICINE

## 2023-11-17 PROCEDURE — 94003 VENT MGMT INPAT SUBQ DAY: CPT

## 2023-11-17 PROCEDURE — 94761 N-INVAS EAR/PLS OXIMETRY MLT: CPT

## 2023-11-17 PROCEDURE — A6212 FOAM DRG <=16 SQ IN W/BORDER: HCPCS

## 2023-11-17 PROCEDURE — 25000003 PHARM REV CODE 250: Performed by: REGISTERED NURSE

## 2023-11-17 PROCEDURE — 27200966 HC CLOSED SUCTION SYSTEM

## 2023-11-17 RX ADMIN — LEVETIRACETAM 500 MG: 500 SOLUTION ORAL at 08:11

## 2023-11-17 RX ADMIN — LEVETIRACETAM 500 MG: 500 SOLUTION ORAL at 09:11

## 2023-11-17 RX ADMIN — MUPIROCIN: 20 OINTMENT TOPICAL at 08:11

## 2023-11-17 RX ADMIN — IPRATROPIUM BROMIDE AND ALBUTEROL SULFATE 3 ML: 2.5; .5 SOLUTION RESPIRATORY (INHALATION) at 07:11

## 2023-11-17 RX ADMIN — APIXABAN 5 MG: 2.5 TABLET, FILM COATED ORAL at 08:11

## 2023-11-17 RX ADMIN — LINEZOLID 600 MG: 600 INJECTION, SOLUTION INTRAVENOUS at 03:11

## 2023-11-17 RX ADMIN — PANTOPRAZOLE SODIUM 40 MG: 40 GRANULE, DELAYED RELEASE ORAL at 08:11

## 2023-11-17 RX ADMIN — PANTOPRAZOLE SODIUM 40 MG: 40 GRANULE, DELAYED RELEASE ORAL at 09:11

## 2023-11-17 RX ADMIN — MUPIROCIN: 20 OINTMENT TOPICAL at 09:11

## 2023-11-17 RX ADMIN — APIXABAN 5 MG: 2.5 TABLET, FILM COATED ORAL at 09:11

## 2023-11-17 NOTE — PLAN OF CARE
Problem: Adult Inpatient Plan of Care  Goal: Plan of Care Review  Outcome: Ongoing, Progressing     Problem: Communication Impairment (Mechanical Ventilation, Invasive)  Goal: Effective Communication  Outcome: Ongoing, Not Progressing     Problem: Inability to Wean (Mechanical Ventilation, Invasive)  Goal: Mechanical Ventilation Liberation  Outcome: Ongoing, Progressing     Problem: Nutrition Impairment (Mechanical Ventilation, Invasive)  Goal: Optimal Nutrition Delivery  Outcome: Ongoing, Progressing

## 2023-11-17 NOTE — CARE UPDATE
RT tech discussed with manager, Tona Beckwith, and agreed to do 16 hours today instead of two 8 hr trials. Then let patient rest through the night.

## 2023-11-17 NOTE — PROGRESS NOTES
No further vomiting reported. No inc residuals.  Pt is doing AVAP trials.    B-180.  Last BM .  Continue POC.

## 2023-11-17 NOTE — CARE UPDATE
Changed Patient's vent mode to AVAPS for first 8 hr. Trial. HR 93, RR 17, o2 sat. 99% on 40% vent.

## 2023-11-17 NOTE — PLAN OF CARE
Patient is sleeping.  HR 93, Rr 17, o2 sat 99% on 40% vent. Patient has slightly coarse bilateral breath sounds.     Problem: Communication Impairment (Mechanical Ventilation, Invasive)  Goal: Effective Communication  Outcome: Ongoing, Progressing  Intervention: Ensure Effective Communication  Flowsheets (Taken 11/17/2023 1054)  Communication Enhancement Strategies:   call light answered in person   family/caregiver assisted with communication   family involved in communication plan     Problem: Device-Related Complication Risk (Mechanical Ventilation, Invasive)  Goal: Optimal Device Function  Outcome: Ongoing, Progressing  Intervention: Optimize Device Care and Function  Flowsheets (Taken 11/17/2023 1054)  Airway Safety Measures:   manual resuscitator/mask at bedside   suction at bedside   oxygen flowmeter at bedside     Problem: Inability to Wean (Mechanical Ventilation, Invasive)  Goal: Mechanical Ventilation Liberation  Outcome: Ongoing, Progressing  Intervention: Promote Extubation and Mechanical Ventilation Liberation  Flowsheets (Taken 11/17/2023 1054)  Environmental Support:   calm environment promoted   caregiver consistency promoted   environmental consistency promoted     Problem: Skin and Tissue Injury (Mechanical Ventilation, Invasive)  Goal: Absence of Device-Related Skin and Tissue Injury  Outcome: Ongoing, Progressing  Intervention: Maintain Skin and Tissue Health  Flowsheets (Taken 11/17/2023 1054)  Device Skin Pressure Protection: absorbent pad utilized/changed     Problem: Ventilator-Induced Lung Injury (Mechanical Ventilation, Invasive)  Goal: Absence of Ventilator-Induced Lung Injury  Outcome: Ongoing, Progressing  Intervention: Facilitate Lung-Protection Measures  Flowsheets (Taken 11/17/2023 1054)  Lung Protection Measures:   low inspiratory pressure provided   low tidal volume provided   lung compliance monitored   optimal PEEP applied   ventilator synchrony promoted  Intervention: Prevent  Ventilator-Associated Pneumonia  Flowsheets (Taken 11/17/2023 1054)  Head of Bed (HOB) Positioning: HOB at 30 degrees     Problem: Communication Impairment (Artificial Airway)  Goal: Effective Communication  Outcome: Ongoing, Progressing  Intervention: Ensure Effective Communication  Flowsheets (Taken 11/17/2023 1054)  Communication Enhancement Strategies:   call light answered in person   family/caregiver assisted with communication   family involved in communication plan  Trust Relationship/Rapport:   care explained   reassurance provided     Problem: Device-Related Complication Risk (Artificial Airway)  Goal: Optimal Device Function  Outcome: Ongoing, Progressing  Intervention: Optimize Device Care and Function  Flowsheets (Taken 11/17/2023 1054)  Airway/Ventilation Management:   airway patency maintained   calming measures promoted   humidification applied   pulmonary hygiene promoted  Airway Safety Measures:   manual resuscitator/mask at bedside   suction at bedside   oxygen flowmeter at bedside     Problem: Skin and Tissue Injury (Artificial Airway)  Goal: Absence of Device-Related Skin or Tissue Injury  Outcome: Ongoing, Progressing     Problem: Noninvasive Ventilation Acute  Goal: Effective Unassisted Ventilation and Oxygenation  Outcome: Ongoing, Progressing  Intervention: Monitor and Manage Noninvasive Ventilation  Flowsheets (Taken 11/17/2023 1054)  Airway/Ventilation Management:   airway patency maintained   calming measures promoted   humidification applied   pulmonary hygiene promoted

## 2023-11-17 NOTE — PT/OT/SLP PROGRESS
Physical Therapy Treatment    Patient Name:  Blue Abdul   MRN:  [id number]    Recommendations:     Discharge Recommendations: Low Intensity Therapy  Discharge Equipment Recommendations: hospital bed, lift device  Barriers to discharge: Decreased caregiver support    Assessment:     Blue Abdul is a 33 y.o. male admitted with a medical diagnosis of Acute hypercapnic respiratory failure.  He presents with the following impairments/functional limitations: weakness, impaired functional mobility, impaired cognition, decreased safety awareness, impaired coordination, impaired endurance, gait instability, decreased coordination, impaired fine motor, impaired sensation, impaired balance, impaired self care skills, visual deficits, decreased lower extremity function .    Pt was supine, pt unable to voice pain , pt presented no facial grimacing.  Pt tolerated tx poor, pt continues unable to perform any AROM or track therapist with eyes with voice cues.     Rehab Prognosis: Poor; patient would benefit from acute skilled PT services to address these deficits and reach maximum level of function.    Recent Surgery: * No surgery found *      Plan:     During this hospitalization, patient to be seen 5 x/week to address the identified rehab impairments via therapeutic exercises and progress toward the following goals:    Plan of Care Expires:  11/17/23    Subjective     Chief Complaint: no pain voiced, no facial grimacing present  Patient/Family Comments/goals: increased BLE ROM  Pain/Comfort:         Objective:     Communicated with pt prior to session.  Patient found supine with blood pressure cuff, rodriguez catheter, oxygen, PEG Tube, telemetry, ventilator, peripheral IV upon PT entry to room.     General Precautions: Standard,    Orthopedic Precautions:    Braces: N/A  Respiratory Status: Ventilator     Functional Mobility:  Bed Mobility:     Rolling Left:  unable  Rolling Right: unable      AM-PAC 6 CLICK  MOBILITY          Treatment & Education:  PTA performed x 10 min PROM of the following BLE exercises to promote increased ROM, decrease contractures, increased blood flow to enhance circulation: heel slides, hip ADD/ABD, PF/DF, SLRs, HS and PF/DF stretching.  Pt was unable to communicate throughout Tx, with no facial grimacing present, remains unable to track therapist with eyes with voice commands.   PTA repositioned pt to L sidelying maxA x2.     Patient left supine with all lines intact and pt mother present, pt nurse, Kaylen notified.     GOALS:   Multidisciplinary Problems       Physical Therapy Goals          Problem: Physical Therapy    Goal Priority Disciplines Outcome Goal Variances Interventions   Physical Therapy Goal     PT, PT/OT Ongoing, Not Progressing     Description: ST. Patient will follow one step command for Active Range of Motion of extremities to allow active participation in therapeutic intervention.       LTG- to be established if patient is able to participate in therapy.                        Time Tracking:     PT Received On: 23  PT Start Time: 1500     PT Stop Time: 1510  PT Total Time (min): 10 min     Billable Minutes: Therapeutic Exercise 10    Treatment Type: Treatment  PT/PTA: PTA     Number of PTA visits since last PT visit: 2023

## 2023-11-17 NOTE — PLAN OF CARE
Problem: Adult Inpatient Plan of Care  Goal: Plan of Care Review  Outcome: Ongoing, Progressing     Problem: Adult Inpatient Plan of Care  Goal: Absence of Hospital-Acquired Illness or Injury  Outcome: Ongoing, Progressing     Problem: Adult Inpatient Plan of Care  Goal: Optimal Comfort and Wellbeing  Outcome: Ongoing, Progressing     Problem: Nutrition Impairment (Mechanical Ventilation, Invasive)  Goal: Optimal Nutrition Delivery  Outcome: Ongoing, Progressing     Problem: Diabetes Comorbidity  Goal: Blood Glucose Level Within Targeted Range  Outcome: Ongoing, Progressing

## 2023-11-17 NOTE — CARE UPDATE
Oral care done by nurse. Trach care completed by RT. Lots of yellow drainage coming out of trach from under the flange.

## 2023-11-17 NOTE — PT/OT/SLP DISCHARGE
Occupational Therapy Discharge Summary    Blue Abdul  MRN: [id number]   Principal Problem: Acute hypercapnic respiratory failure      Patient Discharged from acute Occupational Therapy on 23.  Please refer to prior OT note dated 23 for functional status.    Assessment:      Patient has not met goals.  Pt is unable to actively participate in therapy services. Therapist discussed D/c with Pt's mother and informed her that Pt can be reevaluated when he can participate. Pt's mother was educated on how to perform PROM with Pt's Ues, She stated she understood and would continue to perform PROM and was also agreeable to D/C  Objective:     GOALS:   Multidisciplinary Problems       Occupational Therapy Goals          Problem: Occupational Therapy    Goal Priority Disciplines Outcome Interventions   Occupational Therapy Goal     OT, PT/OT Ongoing, Progressing    Description: ST. Pt will visually track from L<>R sides   2. Patient will follow one step command for Active Range of Motion of extremities to allow active participation in therapeutic intervention.       LTG  to be established if patient is able to participate in therapy.                        Reasons for Discontinuation of Therapy Services  Therapist determines that the patient will no longer benefit from therapy services.      Plan:     Patient Discharged to:  same level of care    2023

## 2023-11-18 LAB
GLUCOSE SERPL-MCNC: 151 MG/DL (ref 70–105)
GLUCOSE SERPL-MCNC: 218 MG/DL (ref 70–105)

## 2023-11-18 PROCEDURE — 27000935

## 2023-11-18 PROCEDURE — 99900035 HC TECH TIME PER 15 MIN (STAT)

## 2023-11-18 PROCEDURE — 94640 AIRWAY INHALATION TREATMENT: CPT

## 2023-11-18 PROCEDURE — 63600175 PHARM REV CODE 636 W HCPCS: Performed by: INTERNAL MEDICINE

## 2023-11-18 PROCEDURE — 25000003 PHARM REV CODE 250: Performed by: INTERNAL MEDICINE

## 2023-11-18 PROCEDURE — 25000242 PHARM REV CODE 250 ALT 637 W/ HCPCS: Performed by: INTERNAL MEDICINE

## 2023-11-18 PROCEDURE — 94761 N-INVAS EAR/PLS OXIMETRY MLT: CPT

## 2023-11-18 PROCEDURE — 27000221 HC OXYGEN, UP TO 24 HOURS

## 2023-11-18 PROCEDURE — 11000004 HC SNF PRIVATE

## 2023-11-18 PROCEDURE — 25000003 PHARM REV CODE 250: Performed by: REGISTERED NURSE

## 2023-11-18 PROCEDURE — 94003 VENT MGMT INPAT SUBQ DAY: CPT

## 2023-11-18 PROCEDURE — 27000941

## 2023-11-18 PROCEDURE — 99900026 HC AIRWAY MAINTENANCE (STAT)

## 2023-11-18 PROCEDURE — 82962 GLUCOSE BLOOD TEST: CPT

## 2023-11-18 RX ORDER — DICLOFENAC SODIUM 10 MG/G
2 GEL TOPICAL 3 TIMES DAILY PRN
Status: DISCONTINUED | OUTPATIENT
Start: 2023-11-18 | End: 2024-01-31 | Stop reason: HOSPADM

## 2023-11-18 RX ADMIN — IPRATROPIUM BROMIDE AND ALBUTEROL SULFATE 3 ML: 2.5; .5 SOLUTION RESPIRATORY (INHALATION) at 07:11

## 2023-11-18 RX ADMIN — LEVETIRACETAM 500 MG: 500 SOLUTION ORAL at 09:11

## 2023-11-18 RX ADMIN — LINEZOLID 600 MG: 600 INJECTION, SOLUTION INTRAVENOUS at 03:11

## 2023-11-18 RX ADMIN — MUPIROCIN: 20 OINTMENT TOPICAL at 08:11

## 2023-11-18 RX ADMIN — DICLOFENAC 2 G: 10 GEL TOPICAL at 08:11

## 2023-11-18 RX ADMIN — PANTOPRAZOLE SODIUM 40 MG: 40 GRANULE, DELAYED RELEASE ORAL at 09:11

## 2023-11-18 RX ADMIN — APIXABAN 5 MG: 2.5 TABLET, FILM COATED ORAL at 08:11

## 2023-11-18 RX ADMIN — PANTOPRAZOLE SODIUM 40 MG: 40 GRANULE, DELAYED RELEASE ORAL at 08:11

## 2023-11-18 RX ADMIN — MUPIROCIN: 20 OINTMENT TOPICAL at 09:11

## 2023-11-18 RX ADMIN — LEVETIRACETAM 500 MG: 500 SOLUTION ORAL at 08:11

## 2023-11-18 RX ADMIN — APIXABAN 5 MG: 2.5 TABLET, FILM COATED ORAL at 09:11

## 2023-11-18 NOTE — PLAN OF CARE
Patient has eyes open but does not respond. HR 95, RR 17, o2 sat 99% on 40% vent with slightly coarse breath sounds.    Problem: Communication Impairment (Mechanical Ventilation, Invasive)  Goal: Effective Communication  Outcome: Ongoing, Progressing  Intervention: Ensure Effective Communication  Flowsheets (Taken 11/18/2023 0747)  Communication Enhancement Strategies:   call light answered in person   family/caregiver assisted with communication   family involved in communication plan     Problem: Device-Related Complication Risk (Mechanical Ventilation, Invasive)  Goal: Optimal Device Function  Outcome: Ongoing, Progressing  Intervention: Optimize Device Care and Function  Flowsheets (Taken 11/18/2023 0747)  Airway Safety Measures:   manual resuscitator/mask at bedside   suction at bedside   oxygen flowmeter at bedside     Problem: Inability to Wean (Mechanical Ventilation, Invasive)  Goal: Mechanical Ventilation Liberation  Outcome: Ongoing, Progressing  Intervention: Promote Extubation and Mechanical Ventilation Liberation  Flowsheets (Taken 11/18/2023 0747)  Environmental Support:   calm environment promoted   caregiver consistency promoted   distractions minimized   rooming-in facilitated     Problem: Skin and Tissue Injury (Mechanical Ventilation, Invasive)  Goal: Absence of Device-Related Skin and Tissue Injury  Outcome: Ongoing, Progressing  Intervention: Maintain Skin and Tissue Health  Flowsheets (Taken 11/18/2023 0747)  Device Skin Pressure Protection: absorbent pad utilized/changed     Problem: Ventilator-Induced Lung Injury (Mechanical Ventilation, Invasive)  Goal: Absence of Ventilator-Induced Lung Injury  Outcome: Ongoing, Progressing  Intervention: Facilitate Lung-Protection Measures  Flowsheets (Taken 11/18/2023 0747)  Lung Protection Measures:   low tidal volume provided   low inspiratory pressure provided   lung compliance monitored   optimal PEEP applied   ventilator synchrony  promoted  Intervention: Prevent Ventilator-Associated Pneumonia  Flowsheets (Taken 11/18/2023 0747)  Head of Bed (HOB) Positioning: HOB at 30 degrees  Oral Care:   suction provided   lip/mouth moisturizer applied   swabbed with antiseptic solution     Problem: Communication Impairment (Artificial Airway)  Goal: Effective Communication  Outcome: Ongoing, Progressing  Intervention: Ensure Effective Communication  Flowsheets (Taken 11/18/2023 0747)  Communication Enhancement Strategies:   call light answered in person   family/caregiver assisted with communication   family involved in communication plan  Trust Relationship/Rapport:   care explained   emotional support provided   reassurance provided     Problem: Device-Related Complication Risk (Artificial Airway)  Goal: Optimal Device Function  Outcome: Ongoing, Progressing  Intervention: Optimize Device Care and Function  Flowsheets (Taken 11/18/2023 0747)  Airway/Ventilation Management:   airway patency maintained   calming measures promoted   humidification applied   pulmonary hygiene promoted  Airway Safety Measures:   manual resuscitator/mask at bedside   suction at bedside   oxygen flowmeter at bedside  Oral Care:   suction provided   lip/mouth moisturizer applied   swabbed with antiseptic solution     Problem: Skin and Tissue Injury (Artificial Airway)  Goal: Absence of Device-Related Skin or Tissue Injury  Outcome: Ongoing, Progressing  Intervention: Maintain Skin and Tissue Health  Flowsheets (Taken 11/18/2023 0747)  Device Skin Pressure Protection: absorbent pad utilized/changed

## 2023-11-18 NOTE — PLAN OF CARE
Problem: Ventilator-Induced Lung Injury (Mechanical Ventilation, Invasive)  Goal: Absence of Ventilator-Induced Lung Injury  Outcome: Ongoing, Progressing     Problem: Communication Impairment (Artificial Airway)  Goal: Effective Communication  Outcome: Ongoing, Progressing     Problem: Device-Related Complication Risk (Artificial Airway)  Goal: Optimal Device Function  Outcome: Ongoing, Progressing     Problem: Skin and Tissue Injury (Artificial Airway)  Goal: Absence of Device-Related Skin or Tissue Injury  Outcome: Ongoing, Progressing     Problem: Infection  Goal: Absence of Infection Signs and Symptoms  Outcome: Ongoing, Progressing

## 2023-11-18 NOTE — PLAN OF CARE
Problem: Adult Inpatient Plan of Care  Goal: Absence of Hospital-Acquired Illness or Injury  Outcome: Ongoing, Progressing  Goal: Optimal Comfort and Wellbeing  Outcome: Ongoing, Progressing     Problem: Adult Inpatient Plan of Care  Goal: Plan of Care Review  Outcome: Ongoing, Not Progressing  Goal: Patient-Specific Goal (Individualized)  Outcome: Ongoing, Not Progressing

## 2023-11-19 LAB
GLUCOSE SERPL-MCNC: 134 MG/DL (ref 70–105)
GLUCOSE SERPL-MCNC: 138 MG/DL (ref 70–105)
GLUCOSE SERPL-MCNC: 146 MG/DL (ref 70–105)
GLUCOSE SERPL-MCNC: 150 MG/DL (ref 70–105)
GLUCOSE SERPL-MCNC: 160 MG/DL (ref 70–105)
GLUCOSE SERPL-MCNC: 167 MG/DL (ref 70–105)

## 2023-11-19 PROCEDURE — 82962 GLUCOSE BLOOD TEST: CPT

## 2023-11-19 PROCEDURE — 99900035 HC TECH TIME PER 15 MIN (STAT)

## 2023-11-19 PROCEDURE — 27000941

## 2023-11-19 PROCEDURE — 94640 AIRWAY INHALATION TREATMENT: CPT

## 2023-11-19 PROCEDURE — 27000935

## 2023-11-19 PROCEDURE — 25000242 PHARM REV CODE 250 ALT 637 W/ HCPCS: Performed by: INTERNAL MEDICINE

## 2023-11-19 PROCEDURE — 25000003 PHARM REV CODE 250: Performed by: INTERNAL MEDICINE

## 2023-11-19 PROCEDURE — 99900026 HC AIRWAY MAINTENANCE (STAT)

## 2023-11-19 PROCEDURE — 94761 N-INVAS EAR/PLS OXIMETRY MLT: CPT

## 2023-11-19 PROCEDURE — 27000221 HC OXYGEN, UP TO 24 HOURS

## 2023-11-19 PROCEDURE — 94003 VENT MGMT INPAT SUBQ DAY: CPT

## 2023-11-19 PROCEDURE — 11000004 HC SNF PRIVATE

## 2023-11-19 PROCEDURE — 25000003 PHARM REV CODE 250: Performed by: REGISTERED NURSE

## 2023-11-19 RX ADMIN — APIXABAN 5 MG: 2.5 TABLET, FILM COATED ORAL at 08:11

## 2023-11-19 RX ADMIN — PANTOPRAZOLE SODIUM 40 MG: 40 GRANULE, DELAYED RELEASE ORAL at 08:11

## 2023-11-19 RX ADMIN — IPRATROPIUM BROMIDE AND ALBUTEROL SULFATE 3 ML: 2.5; .5 SOLUTION RESPIRATORY (INHALATION) at 07:11

## 2023-11-19 RX ADMIN — LEVETIRACETAM 500 MG: 500 SOLUTION ORAL at 08:11

## 2023-11-19 RX ADMIN — MUPIROCIN: 20 OINTMENT TOPICAL at 08:11

## 2023-11-19 NOTE — PLAN OF CARE
Problem: Adult Inpatient Plan of Care  Goal: Plan of Care Review  Outcome: Ongoing, Progressing  Goal: Patient-Specific Goal (Individualized)  Outcome: Ongoing, Progressing  Goal: Absence of Hospital-Acquired Illness or Injury  Outcome: Ongoing, Progressing  Goal: Optimal Comfort and Wellbeing  Outcome: Ongoing, Progressing     Problem: Communication Impairment (Mechanical Ventilation, Invasive)  Goal: Effective Communication  Outcome: Ongoing, Progressing     Problem: Device-Related Complication Risk (Mechanical Ventilation, Invasive)  Goal: Optimal Device Function  Outcome: Ongoing, Progressing     Problem: Inability to Wean (Mechanical Ventilation, Invasive)  Goal: Mechanical Ventilation Liberation  Outcome: Ongoing, Progressing     Problem: Nutrition Impairment (Mechanical Ventilation, Invasive)  Goal: Optimal Nutrition Delivery  Outcome: Ongoing, Progressing     Problem: Skin and Tissue Injury (Mechanical Ventilation, Invasive)  Goal: Absence of Device-Related Skin and Tissue Injury  Outcome: Ongoing, Progressing     Problem: Ventilator-Induced Lung Injury (Mechanical Ventilation, Invasive)  Goal: Absence of Ventilator-Induced Lung Injury  Outcome: Ongoing, Progressing     Problem: Communication Impairment (Artificial Airway)  Goal: Effective Communication  Outcome: Ongoing, Progressing     Problem: Device-Related Complication Risk (Artificial Airway)  Goal: Optimal Device Function  Outcome: Ongoing, Progressing     Problem: Skin and Tissue Injury (Artificial Airway)  Goal: Absence of Device-Related Skin or Tissue Injury  Outcome: Ongoing, Progressing     Problem: Noninvasive Ventilation Acute  Goal: Effective Unassisted Ventilation and Oxygenation  Outcome: Ongoing, Progressing     Problem: Skin Injury Risk Increased  Goal: Skin Health and Integrity  Outcome: Ongoing, Progressing

## 2023-11-19 NOTE — PLAN OF CARE
Patient is alert but does not communicate.  , RR 35, o2 sat 100% on 40% vent with clear bilateral breath sounds.    Problem: Communication Impairment (Mechanical Ventilation, Invasive)  Goal: Effective Communication  Outcome: Ongoing, Progressing  Intervention: Ensure Effective Communication  Flowsheets (Taken 11/19/2023 0747)  Communication Enhancement Strategies: (Patient unable to communicate at this time.)   family involved in communication plan   family/caregiver assisted with communication     Problem: Device-Related Complication Risk (Mechanical Ventilation, Invasive)  Goal: Optimal Device Function  Outcome: Ongoing, Progressing  Intervention: Optimize Device Care and Function  Flowsheets (Taken 11/19/2023 0747)  Airway Safety Measures:   manual resuscitator/mask at bedside   suction at bedside     Problem: Inability to Wean (Mechanical Ventilation, Invasive)  Goal: Mechanical Ventilation Liberation  Outcome: Ongoing, Progressing  Intervention: Promote Extubation and Mechanical Ventilation Liberation  Flowsheets (Taken 11/19/2023 0747)  Sleep/Rest Enhancement: family presence promoted  Environmental Support:   caregiver consistency promoted   rooming-in facilitated     Problem: Skin and Tissue Injury (Mechanical Ventilation, Invasive)  Goal: Absence of Device-Related Skin and Tissue Injury  Outcome: Ongoing, Progressing  Intervention: Maintain Skin and Tissue Health  Flowsheets (Taken 11/19/2023 0747)  Device Skin Pressure Protection: absorbent pad utilized/changed     Problem: Ventilator-Induced Lung Injury (Mechanical Ventilation, Invasive)  Goal: Absence of Ventilator-Induced Lung Injury  Outcome: Ongoing, Progressing  Intervention: Facilitate Lung-Protection Measures  Flowsheets (Taken 11/19/2023 0747)  Lung Protection Measures:   low tidal volume provided   lung compliance monitored   optimal PEEP applied   ventilator synchrony promoted  Intervention: Prevent Ventilator-Associated  Pneumonia  Flowsheets (Taken 11/19/2023 0747)  Head of Bed (HOB) Positioning: HOB at 30 degrees  Oral Care:   swabbed with sterile water   teeth brushed   suction provided   lip/mouth moisturizer applied   swabbed with antiseptic solution     Problem: Communication Impairment (Artificial Airway)  Goal: Effective Communication  Outcome: Ongoing, Progressing  Intervention: Ensure Effective Communication  Flowsheets (Taken 11/19/2023 0747)  Communication Enhancement Strategies: (Patient unable to communicate at this time.)   family involved in communication plan   family/caregiver assisted with communication  Trust Relationship/Rapport: care explained     Problem: Device-Related Complication Risk (Artificial Airway)  Goal: Optimal Device Function  Outcome: Ongoing, Progressing  Intervention: Optimize Device Care and Function  Flowsheets (Taken 11/19/2023 0747)  Airway/Ventilation Management:   airway patency maintained   calming measures promoted   humidification applied   pulmonary hygiene promoted  Airway Safety Measures:   manual resuscitator/mask at bedside   suction at bedside  Oral Care:   swabbed with sterile water   teeth brushed   suction provided   lip/mouth moisturizer applied   swabbed with antiseptic solution     Problem: Skin and Tissue Injury (Artificial Airway)  Goal: Absence of Device-Related Skin or Tissue Injury  Outcome: Ongoing, Progressing  Intervention: Maintain Skin and Tissue Health  Flowsheets (Taken 11/19/2023 0747)  Device Skin Pressure Protection: absorbent pad utilized/changed

## 2023-11-19 NOTE — PROGRESS NOTES
Placed patient back on the vent via SIMV with previous settings for rest. Patient did 17 hours of AVAPS -AE and did fine with no complications.

## 2023-11-20 LAB
GLUCOSE SERPL-MCNC: 140 MG/DL (ref 70–105)
GLUCOSE SERPL-MCNC: 147 MG/DL (ref 70–105)
GLUCOSE SERPL-MCNC: 147 MG/DL (ref 70–105)

## 2023-11-20 PROCEDURE — 27000941

## 2023-11-20 PROCEDURE — 94761 N-INVAS EAR/PLS OXIMETRY MLT: CPT

## 2023-11-20 PROCEDURE — 11000004 HC SNF PRIVATE

## 2023-11-20 PROCEDURE — 82962 GLUCOSE BLOOD TEST: CPT

## 2023-11-20 PROCEDURE — 27000935

## 2023-11-20 PROCEDURE — 94640 AIRWAY INHALATION TREATMENT: CPT

## 2023-11-20 PROCEDURE — 25000003 PHARM REV CODE 250: Performed by: REGISTERED NURSE

## 2023-11-20 PROCEDURE — 99900035 HC TECH TIME PER 15 MIN (STAT)

## 2023-11-20 PROCEDURE — 94003 VENT MGMT INPAT SUBQ DAY: CPT

## 2023-11-20 PROCEDURE — 25000242 PHARM REV CODE 250 ALT 637 W/ HCPCS: Performed by: INTERNAL MEDICINE

## 2023-11-20 PROCEDURE — 27000221 HC OXYGEN, UP TO 24 HOURS

## 2023-11-20 PROCEDURE — 99900026 HC AIRWAY MAINTENANCE (STAT)

## 2023-11-20 PROCEDURE — 25000003 PHARM REV CODE 250: Performed by: INTERNAL MEDICINE

## 2023-11-20 RX ADMIN — PANTOPRAZOLE SODIUM 40 MG: 40 GRANULE, DELAYED RELEASE ORAL at 08:11

## 2023-11-20 RX ADMIN — IPRATROPIUM BROMIDE AND ALBUTEROL SULFATE 3 ML: 2.5; .5 SOLUTION RESPIRATORY (INHALATION) at 07:11

## 2023-11-20 RX ADMIN — MUPIROCIN: 20 OINTMENT TOPICAL at 08:11

## 2023-11-20 RX ADMIN — LEVETIRACETAM 500 MG: 500 SOLUTION ORAL at 08:11

## 2023-11-20 RX ADMIN — APIXABAN 5 MG: 2.5 TABLET, FILM COATED ORAL at 08:11

## 2023-11-20 NOTE — CARE UPDATE
Placed patient on AVAPS for 24 trial.  Will continue on AVAPS as long as tolerated. , RR 24, o2 sat 94% on 40% vent o2.

## 2023-11-20 NOTE — PT/OT/SLP DISCHARGE
Physical Therapy Discharge Summary    Name: Blue Abdul  MRN: [id number]   Principal Problem: Acute hypercapnic respiratory failure     Patient Discharged from acute Physical Therapy on 2023.  Please refer to prior PT noted date on 2023 for functional status.     Assessment:     Patient is no longer making progress. Patient has not met goals.    Objective:     GOALS:   Multidisciplinary Problems       Physical Therapy Goals          Problem: Physical Therapy    Goal Priority Disciplines Outcome Goal Variances Interventions   Physical Therapy Goal     PT, PT/OT Ongoing, Not Progressing     Description: ST. Patient will follow one step command for Active Range of Motion of extremities to allow active participation in therapeutic intervention.       LTG- to be established if patient is able to participate in therapy.                        Reasons for Discontinuation of Therapy Services  Therapist determines that the patient will no longer benefit from therapy services.      Plan:     Patient Discharged to: Home no PT services needed.      2023

## 2023-11-20 NOTE — CARE UPDATE
0130 Placed patient back on simv rate of 8 vt 450 peep of 5 ps of 12 @ 40%. Patient did 18 hour avaps trial with no complications.  RR 18 Sats 100%. CH crt

## 2023-11-20 NOTE — PROGRESS NOTES
Wt: 196#  RDN visited pt this a.m. No further GI issues.  Pt is doing a 24 hour trach trial, trach is #8.  BG :[phone].  No new lab.  Continue NS

## 2023-11-20 NOTE — PLAN OF CARE
Patient is awake but does not communicate.  , RR 31, o2 sat 100% on 40% vent. Pt. Has some rhonci and is slightly coarse. Pt. Placed on AVAPS for 24 hour trial.    Problem: Communication Impairment (Mechanical Ventilation, Invasive)  Goal: Effective Communication  Outcome: Ongoing, Progressing  Intervention: Ensure Effective Communication  Flowsheets (Taken 11/20/2023 0957)  Communication Enhancement Strategies:   family involved in communication plan   family/caregiver assisted with communication     Problem: Device-Related Complication Risk (Mechanical Ventilation, Invasive)  Goal: Optimal Device Function  Outcome: Ongoing, Progressing  Intervention: Optimize Device Care and Function  Flowsheets (Taken 11/20/2023 0957)  Airway Safety Measures:   manual resuscitator/mask at bedside   suction at bedside   oxygen flowmeter at bedside     Problem: Inability to Wean (Mechanical Ventilation, Invasive)  Goal: Mechanical Ventilation Liberation  Outcome: Ongoing, Progressing  Intervention: Promote Extubation and Mechanical Ventilation Liberation  Flowsheets (Taken 11/20/2023 0957)  Sleep/Rest Enhancement: family presence promoted  Environmental Support:   caregiver consistency promoted   calm environment promoted     Problem: Skin and Tissue Injury (Mechanical Ventilation, Invasive)  Goal: Absence of Device-Related Skin and Tissue Injury  Outcome: Ongoing, Progressing  Intervention: Maintain Skin and Tissue Health  Flowsheets (Taken 11/20/2023 0957)  Device Skin Pressure Protection: absorbent pad utilized/changed     Problem: Ventilator-Induced Lung Injury (Mechanical Ventilation, Invasive)  Goal: Absence of Ventilator-Induced Lung Injury  Outcome: Ongoing, Progressing  Intervention: Facilitate Lung-Protection Measures  Flowsheets (Taken 11/20/2023 0957)  Lung Protection Measures:   low tidal volume provided   low inspiratory pressure provided   optimal PEEP applied   ventilator synchrony promoted  Intervention:  Prevent Ventilator-Associated Pneumonia  Flowsheets (Taken 11/20/2023 0957)  Head of Bed (HOB) Positioning: HOB at 30-45 degrees  Oral Care:   lip/mouth moisturizer applied   suction provided   swabbed with antiseptic solution   teeth brushed   tongue brushed     Problem: Communication Impairment (Artificial Airway)  Goal: Effective Communication  Outcome: Ongoing, Progressing  Intervention: Ensure Effective Communication  Flowsheets (Taken 11/20/2023 0957)  Communication Enhancement Strategies:   family involved in communication plan   family/caregiver assisted with communication  Family/Support System Care:   caregiver stress acknowledged   involvement promoted   presence promoted  Trust Relationship/Rapport:   emotional support provided   care explained     Problem: Device-Related Complication Risk (Artificial Airway)  Goal: Optimal Device Function  Outcome: Ongoing, Progressing  Intervention: Optimize Device Care and Function  Flowsheets (Taken 11/20/2023 0957)  Airway Safety Measures:   manual resuscitator/mask at bedside   suction at bedside   oxygen flowmeter at bedside  Oral Care:   lip/mouth moisturizer applied   suction provided   swabbed with antiseptic solution   teeth brushed   tongue brushed     Problem: Skin and Tissue Injury (Artificial Airway)  Goal: Absence of Device-Related Skin or Tissue Injury  Outcome: Ongoing, Progressing  Intervention: Maintain Skin and Tissue Health  Flowsheets (Taken 11/20/2023 0957)  Device Skin Pressure Protection: absorbent pad utilized/changed

## 2023-11-21 LAB
ANION GAP SERPL CALCULATED.3IONS-SCNC: 12 MMOL/L (ref 7–16)
BASOPHILS # BLD AUTO: 0.02 K/UL (ref 0–0.2)
BASOPHILS NFR BLD AUTO: 0.3 % (ref 0–1)
BUN SERPL-MCNC: 14 MG/DL (ref 7–18)
BUN/CREAT SERPL: 29 (ref 6–20)
CALCIUM SERPL-MCNC: 10 MG/DL (ref 8.5–10.1)
CHLORIDE SERPL-SCNC: 103 MMOL/L (ref 98–107)
CO2 SERPL-SCNC: 27 MMOL/L (ref 21–32)
CREAT SERPL-MCNC: 0.49 MG/DL (ref 0.7–1.3)
DIFFERENTIAL METHOD BLD: ABNORMAL
EGFR (NO RACE VARIABLE) (RUSH/TITUS): 141 ML/MIN/1.73M2
EOSINOPHIL # BLD AUTO: 0.12 K/UL (ref 0–0.5)
EOSINOPHIL NFR BLD AUTO: 1.9 % (ref 1–4)
EOSINOPHIL NFR BLD MANUAL: 2 % (ref 1–4)
ERYTHROCYTE [DISTWIDTH] IN BLOOD BY AUTOMATED COUNT: 18.1 % (ref 11.5–14.5)
GLUCOSE SERPL-MCNC: 130 MG/DL (ref 70–105)
GLUCOSE SERPL-MCNC: 130 MG/DL (ref 74–106)
GLUCOSE SERPL-MCNC: 143 MG/DL (ref 70–105)
GLUCOSE SERPL-MCNC: 144 MG/DL (ref 70–105)
GLUCOSE SERPL-MCNC: 166 MG/DL (ref 70–105)
HCT VFR BLD AUTO: 36.4 % (ref 40–54)
HGB BLD-MCNC: 11.1 G/DL (ref 13.5–18)
LYMPHOCYTES # BLD AUTO: 1.37 K/UL (ref 1–4.8)
LYMPHOCYTES NFR BLD AUTO: 22.2 % (ref 27–41)
LYMPHOCYTES NFR BLD MANUAL: 23 % (ref 27–41)
MCH RBC QN AUTO: 28.5 PG (ref 27–31)
MCHC RBC AUTO-ENTMCNC: 30.5 G/DL (ref 32–36)
MCV RBC AUTO: 93.3 FL (ref 80–96)
MONOCYTES # BLD AUTO: 0.58 K/UL (ref 0–0.8)
MONOCYTES NFR BLD AUTO: 9.4 % (ref 2–6)
MONOCYTES NFR BLD MANUAL: 6 % (ref 2–6)
MPC BLD CALC-MCNC: 10.8 FL (ref 9.4–12.4)
NEUTROPHILS # BLD AUTO: 4.09 K/UL (ref 1.8–7.7)
NEUTROPHILS NFR BLD AUTO: 66.2 % (ref 53–65)
NEUTS SEG NFR BLD MANUAL: 69 % (ref 50–62)
NRBC BLD MANUAL-RTO: ABNORMAL %
PLATELET # BLD AUTO: 541 K/UL (ref 150–400)
PLATELET MORPHOLOGY: NORMAL
POTASSIUM SERPL-SCNC: 5.1 MMOL/L (ref 3.5–5.1)
RBC # BLD AUTO: 3.9 M/UL (ref 4.6–6.2)
RBC MORPH BLD: NORMAL
SODIUM SERPL-SCNC: 137 MMOL/L (ref 136–145)
WBC # BLD AUTO: 6.18 K/UL (ref 4.5–11)

## 2023-11-21 PROCEDURE — 82962 GLUCOSE BLOOD TEST: CPT

## 2023-11-21 PROCEDURE — 27000935

## 2023-11-21 PROCEDURE — 80048 BASIC METABOLIC PNL TOTAL CA: CPT | Performed by: INTERNAL MEDICINE

## 2023-11-21 PROCEDURE — 27000958

## 2023-11-21 PROCEDURE — 94761 N-INVAS EAR/PLS OXIMETRY MLT: CPT

## 2023-11-21 PROCEDURE — 94640 AIRWAY INHALATION TREATMENT: CPT

## 2023-11-21 PROCEDURE — 27000221 HC OXYGEN, UP TO 24 HOURS

## 2023-11-21 PROCEDURE — 94003 VENT MGMT INPAT SUBQ DAY: CPT

## 2023-11-21 PROCEDURE — 99900026 HC AIRWAY MAINTENANCE (STAT)

## 2023-11-21 PROCEDURE — 11000004 HC SNF PRIVATE

## 2023-11-21 PROCEDURE — 25000003 PHARM REV CODE 250: Performed by: REGISTERED NURSE

## 2023-11-21 PROCEDURE — 25000242 PHARM REV CODE 250 ALT 637 W/ HCPCS: Performed by: INTERNAL MEDICINE

## 2023-11-21 PROCEDURE — 85025 COMPLETE CBC W/AUTO DIFF WBC: CPT | Performed by: INTERNAL MEDICINE

## 2023-11-21 PROCEDURE — 25000003 PHARM REV CODE 250: Performed by: INTERNAL MEDICINE

## 2023-11-21 PROCEDURE — 99900035 HC TECH TIME PER 15 MIN (STAT)

## 2023-11-21 RX ADMIN — APIXABAN 5 MG: 2.5 TABLET, FILM COATED ORAL at 08:11

## 2023-11-21 RX ADMIN — LEVETIRACETAM 500 MG: 500 SOLUTION ORAL at 08:11

## 2023-11-21 RX ADMIN — IPRATROPIUM BROMIDE AND ALBUTEROL SULFATE 3 ML: 2.5; .5 SOLUTION RESPIRATORY (INHALATION) at 07:11

## 2023-11-21 RX ADMIN — PANTOPRAZOLE SODIUM 40 MG: 40 GRANULE, DELAYED RELEASE ORAL at 08:11

## 2023-11-21 NOTE — PLAN OF CARE
[facility] - [facility] Unit  Discharge Reassessment    Primary Care Provider: Nati Doyle FNP    Expected Discharge Date:     Reassessment (most recent)       Discharge Reassessment - 11/21/23 0941          Discharge Reassessment    Assessment Type Discharge Planning Assessment     Did the patient's condition or plan change since previous assessment? No     Discharge Plan discussed with: Caregiver     Name(s) and Number(s) Linn     Communicated CRICKET with patient/caregiver Date not available/Unable to determine     Discharge Plan A Home with family;Home Health     Transition of Care Barriers None     Why the patient remains in the hospital Requires continued medical care        Post-Acute Status    Post-Acute Authorization Home Health     Coverage Medicare     Patient choice form signed by patient/caregiver List with quality metrics by geographic area provided     Discharge Delays None known at this time                   Discharge plan at this time is home with parents and home health , will follow for assistance pt is still on AVAPS .

## 2023-11-21 NOTE — PLAN OF CARE
Problem: Adult Inpatient Plan of Care  Goal: Plan of Care Review  Outcome: Ongoing, Progressing     Problem: Adult Inpatient Plan of Care  Goal: Absence of Hospital-Acquired Illness or Injury  Outcome: Ongoing, Progressing     Problem: Adult Inpatient Plan of Care  Goal: Optimal Comfort and Wellbeing  Outcome: Ongoing, Progressing     Problem: Device-Related Complication Risk (Mechanical Ventilation, Invasive)  Goal: Optimal Device Function  Outcome: Ongoing, Progressing     Problem: Skin and Tissue Injury (Artificial Airway)  Goal: Absence of Device-Related Skin or Tissue Injury  Outcome: Ongoing, Progressing

## 2023-11-21 NOTE — PLAN OF CARE
Patient is awake but does not respond to commands.  , RR 31, o2 sat 97% on 40% vent AVAPS settings. He has some rhonci but cleared after neb tx.    Problem: Communication Impairment (Mechanical Ventilation, Invasive)  Goal: Effective Communication  Outcome: Ongoing, Progressing  Intervention: Ensure Effective Communication  Flowsheets (Taken 11/21/2023 0746)  Communication Enhancement Strategies: family/caregiver assisted with communication     Problem: Device-Related Complication Risk (Mechanical Ventilation, Invasive)  Goal: Optimal Device Function  Outcome: Ongoing, Progressing  Intervention: Optimize Device Care and Function  Flowsheets (Taken 11/21/2023 0746)  Airway Safety Measures:   manual resuscitator/mask at bedside   suction at bedside     Problem: Inability to Wean (Mechanical Ventilation, Invasive)  Goal: Mechanical Ventilation Liberation  Outcome: Ongoing, Progressing  Intervention: Promote Extubation and Mechanical Ventilation Liberation  Flowsheets (Taken 11/21/2023 0746)  Environmental Support:   calm environment promoted   caregiver consistency promoted   environmental consistency promoted     Problem: Skin and Tissue Injury (Mechanical Ventilation, Invasive)  Goal: Absence of Device-Related Skin and Tissue Injury  Outcome: Ongoing, Progressing  Intervention: Maintain Skin and Tissue Health  Flowsheets (Taken 11/21/2023 0746)  Device Skin Pressure Protection: absorbent pad utilized/changed     Problem: Ventilator-Induced Lung Injury (Mechanical Ventilation, Invasive)  Goal: Absence of Ventilator-Induced Lung Injury  Outcome: Ongoing, Progressing  Intervention: Facilitate Lung-Protection Measures  Flowsheets (Taken 11/21/2023 0746)  Lung Protection Measures:   low tidal volume provided   low inspiratory pressure provided   optimal PEEP applied   ventilator synchrony promoted  Intervention: Prevent Ventilator-Associated Pneumonia  Flowsheets (Taken 11/21/2023 0746)  Head of Bed (HOB)  Positioning: HOB at 30 degrees  Oral Care:   lip/mouth moisturizer applied   mouth wash rinse   swabbed with antiseptic solution   suction provided     Problem: Communication Impairment (Artificial Airway)  Goal: Effective Communication  Outcome: Ongoing, Progressing  Intervention: Ensure Effective Communication  Flowsheets (Taken 11/21/2023 0746)  Communication Enhancement Strategies: family/caregiver assisted with communication  Family/Support System Care:   caregiver stress acknowledged   involvement promoted  Trust Relationship/Rapport:   care explained   reassurance provided     Problem: Device-Related Complication Risk (Artificial Airway)  Goal: Optimal Device Function  Outcome: Ongoing, Progressing  Intervention: Optimize Device Care and Function  Flowsheets (Taken 11/21/2023 0746)  Airway/Ventilation Management:   airway patency maintained   calming measures promoted   humidification applied   pulmonary hygiene promoted  Airway Safety Measures:   manual resuscitator/mask at bedside   suction at bedside  Oral Care:   lip/mouth moisturizer applied   mouth wash rinse   swabbed with antiseptic solution   suction provided     Problem: Skin and Tissue Injury (Artificial Airway)  Goal: Absence of Device-Related Skin or Tissue Injury  Outcome: Ongoing, Progressing  Intervention: Maintain Skin and Tissue Health  Flowsheets (Taken 11/21/2023 0746)  Device Skin Pressure Protection: absorbent pad utilized/changed     Problem: Noninvasive Ventilation Acute  Goal: Effective Unassisted Ventilation and Oxygenation  Outcome: Ongoing, Progressing  Intervention: Monitor and Manage Noninvasive Ventilation  Flowsheets (Taken 11/21/2023 0746)  Airway/Ventilation Management:   airway patency maintained   calming measures promoted   humidification applied   pulmonary hygiene promoted

## 2023-11-21 NOTE — PLAN OF CARE
Weekly Swing Bed Note    Patient is on AVAPS with the following settings , Fio2 40%.  Sat this morning was 97% on o2.  BBS coarse.

## 2023-11-22 LAB — GLUCOSE SERPL-MCNC: 157 MG/DL (ref 70–105)

## 2023-11-22 PROCEDURE — 94003 VENT MGMT INPAT SUBQ DAY: CPT

## 2023-11-22 PROCEDURE — 94640 AIRWAY INHALATION TREATMENT: CPT

## 2023-11-22 PROCEDURE — 25000003 PHARM REV CODE 250: Performed by: INTERNAL MEDICINE

## 2023-11-22 PROCEDURE — 27000935

## 2023-11-22 PROCEDURE — 11000004 HC SNF PRIVATE

## 2023-11-22 PROCEDURE — 82962 GLUCOSE BLOOD TEST: CPT

## 2023-11-22 PROCEDURE — 25000242 PHARM REV CODE 250 ALT 637 W/ HCPCS: Performed by: INTERNAL MEDICINE

## 2023-11-22 PROCEDURE — 27000958

## 2023-11-22 PROCEDURE — 99315 NF DSCHRG MGMT 30 MIN/LESS: CPT | Mod: ,,, | Performed by: INTERNAL MEDICINE

## 2023-11-22 PROCEDURE — 25000003 PHARM REV CODE 250: Performed by: REGISTERED NURSE

## 2023-11-22 PROCEDURE — 99900035 HC TECH TIME PER 15 MIN (STAT)

## 2023-11-22 PROCEDURE — 27000221 HC OXYGEN, UP TO 24 HOURS

## 2023-11-22 PROCEDURE — 99900026 HC AIRWAY MAINTENANCE (STAT)

## 2023-11-22 PROCEDURE — 27200966 HC CLOSED SUCTION SYSTEM

## 2023-11-22 PROCEDURE — 94761 N-INVAS EAR/PLS OXIMETRY MLT: CPT

## 2023-11-22 RX ADMIN — IPRATROPIUM BROMIDE AND ALBUTEROL SULFATE 3 ML: 2.5; .5 SOLUTION RESPIRATORY (INHALATION) at 07:11

## 2023-11-22 RX ADMIN — PANTOPRAZOLE SODIUM 40 MG: 40 GRANULE, DELAYED RELEASE ORAL at 09:11

## 2023-11-22 RX ADMIN — APIXABAN 5 MG: 2.5 TABLET, FILM COATED ORAL at 09:11

## 2023-11-22 RX ADMIN — LEVETIRACETAM 500 MG: 500 SOLUTION ORAL at 08:11

## 2023-11-22 RX ADMIN — APIXABAN 5 MG: 2.5 TABLET, FILM COATED ORAL at 08:11

## 2023-11-22 RX ADMIN — LEVETIRACETAM 500 MG: 500 SOLUTION ORAL at 09:11

## 2023-11-22 RX ADMIN — PANTOPRAZOLE SODIUM 40 MG: 40 GRANULE, DELAYED RELEASE ORAL at 08:11

## 2023-11-22 NOTE — PLAN OF CARE
Problem: Nutrition Impairment (Mechanical Ventilation, Invasive)  Goal: Optimal Nutrition Delivery  Outcome: Ongoing, Progressing     Problem: Skin and Tissue Injury (Mechanical Ventilation, Invasive)  Goal: Absence of Device-Related Skin and Tissue Injury  Outcome: Ongoing, Progressing     Problem: Ventilator-Induced Lung Injury (Mechanical Ventilation, Invasive)  Goal: Absence of Ventilator-Induced Lung Injury  Outcome: Ongoing, Progressing     Problem: Communication Impairment (Artificial Airway)  Goal: Effective Communication  Outcome: Ongoing, Progressing     Problem: Device-Related Complication Risk (Artificial Airway)  Goal: Optimal Device Function  Outcome: Ongoing, Progressing     Problem: Skin and Tissue Injury (Artificial Airway)  Goal: Absence of Device-Related Skin or Tissue Injury  Outcome: Ongoing, Progressing     Problem: Noninvasive Ventilation Acute  Goal: Effective Unassisted Ventilation and Oxygenation  Outcome: Ongoing, Progressing

## 2023-11-22 NOTE — PLAN OF CARE
Patient's SAT this AM was 96% on AVAPS mode with FIO2 of 40%        Problem: Ventilator-Induced Lung Injury (Mechanical Ventilation, Invasive)  Goal: Absence of Ventilator-Induced Lung Injury  Outcome: Ongoing, Progressing     Problem: Device-Related Complication Risk (Artificial Airway)  Goal: Optimal Device Function  Outcome: Ongoing, Progressing     Problem: Skin and Tissue Injury (Artificial Airway)  Goal: Absence of Device-Related Skin or Tissue Injury  Outcome: Ongoing, Progressing

## 2023-11-22 NOTE — CARE UPDATE
[facility] - [facility] Surgical Unit - Swing Bed   Interdisciplinary Team Meeting    Patient: Blue Abdul   Today's Date: 11/22/2023   Estimated D/C Date:         Physician: Ramiro Flores MD Nurse Practitioner:  nida   Pharmacy:  [facility] Unit Director: Hollykathy Carrillo   : Anita Krause Physical/Occupational Therapy: Jadon Mark   Speech Therapy:  Monica Anderson Activity Therapy: nida   Nursing: Holly Carrillo  Respiratory: Tona Beckwith Dietary: Kuldeep Boggs  Other: na     Nurse  New Symptoms/Problems: na  Last Bowel Movement: 11/22/23   Urine: incontinent  Rojas: No  Bowel: incontinent   Constipated: No  Diarrhea: No   Isolation: Yes  Wound Care: No  Wound Location/Tx: na  Cognition: patient unresponsive  Aspiration Precautions: Yes  Comment(s): na    Respiratory   O2 Device:  AVAPS  O2 Flow: 40%  SpO2: 95%  Neb Tx: No  Comment(s): SHOULD BE CLOSE TO STARTING TRACH TRIALS     Dietary  Nutrition: NPO  Comment(s): GLUCERNA AT 40CC/HR    Speech Therapy  Speech/Swallowing:  NPO  Comment(s): TRACH     Physical Therapy  Gait/Assistive Device: NA ELOS: CONT    Transfers: Activity did not occur  Bed Mobility: Total Assistance Range of Motion/Restrictions: NA  Comment(s): MOTHER TAUGHT PASSIVE ROM     Occupational Therapy  Eating/Grooming: Total Assistance Toileting: Activity did not occur   Bathing: Total Assistance Dressing (Upper Body): Total Assistance   Dressing (Lower Body): Total Assistance Comment(s): OT DISCHARGED PT      Pharmacy  Medication Changes (see all MD orders in chart): No  Labs Reviewed: Yes  New Lab Orders: No  Comment(s): COMPLETED ZYVOXX      Tx Plan/Recommendations reviewed with family and/or patient on (date) 11/22/23.  Additional family Conference/Training: NIDA  D/C Plan/Recommendations: Home with family  CRICKET:   Comment(s): NIDA

## 2023-11-22 NOTE — ASSESSMENT & PLAN NOTE
Opens eyes but does not do anything to command  Likely component of anoxic encephalopathy  He also had low blood sugar which could have contributed  11/22- no significant change. Questionable posturing

## 2023-11-22 NOTE — PLAN OF CARE
Problem: Adult Inpatient Plan of Care  Goal: Plan of Care Review  Outcome: Ongoing, Progressing     Problem: Adult Inpatient Plan of Care  Goal: Absence of Hospital-Acquired Illness or Injury  Outcome: Ongoing, Progressing     Problem: Adult Inpatient Plan of Care  Goal: Optimal Comfort and Wellbeing  Outcome: Ongoing, Progressing     Problem: Device-Related Complication Risk (Mechanical Ventilation, Invasive)  Goal: Optimal Device Function  Outcome: Ongoing, Progressing

## 2023-11-22 NOTE — RESPIRATORY THERAPY
10:36  Took patient off of ventilator and placed on trach collar.  Patient's  vital signs at this time was; Heart rate 123, Respiratory rate 38, and % on AVAPS mode with FIO2 of 40%      11:45  Took patient off of Trach collar and place back on the vent on AVAPS with 40% FIO2.  Vital signs were; Heart rate 122, Respiratory rate 32 and SAT of 93%.  Patient has no problems during this trial.      Trial #2    1432 Took patient off of ventilator and placed on trach collar. Patient's vital signs were; Heart rate 109, Respirations 29 and %.      1534  Took patient off of Trach collar and placed back on the ventilator on AVAPS with FIO2 of 40%.  Vital signs were; Heart rate 109, Respirations 26, and SAT 99%.  Patient was awake and resting comfortably.  No problems during this trial.

## 2023-11-22 NOTE — ASSESSMENT & PLAN NOTE
Currently on AVAPS 6 hours bid and doing ok  Continue to wean as tolerated  Sputum culture with heavy growth MRSA so Started linezolid bid for 7 days  11/22- afebrile. Will start some trach collar today

## 2023-11-22 NOTE — PROGRESS NOTES
[facility] - [facility] Unit  Pulmonology  Progress Note    Patient Name: Blue Abdul  MRN: [id number]  Admission Date: 11/9/2023  Hospital Length of Stay: 13 days  Code Status: Full Code  Attending Provider: Ramiro Flores MD  Primary Care Provider: Nati Doyle FNP   Principal Problem: Acute hypercapnic respiratory failure    Subjective:     Interval History: No acute events. Currently resting comfortably. Opens eyes spontaneously but does not obey any commands. Afebrile today and vital signs are stable.      Objective:     Vital Signs (Most Recent):  Temp: 97.4 °F (36.3 °C) (11/22/23 0745)  Pulse: (!) 111 (11/22/23 0745)  Resp: (!) 26 (11/22/23 0745)  BP: 115/63 (11/22/23 0745)  SpO2: 95 % (11/22/23 0745) Vital Signs (24h Range):  Temp:  [97.4 °F (36.3 °C)-99.8 °F (37.7 °C)] 97.4 °F (36.3 °C)  Pulse:  [] 111  Resp:  [18-40] 26  SpO2:  [95 %-100 %] 95 %  BP: (115-133)/(63-85) 115/63     Weight: 89.4 kg (197 lb 3.2 oz)  Body mass index is 39.83 kg/m².      Intake/Output Summary (Last 24 hours) at 11/22/2023 0936  Last data filed at 11/22/2023 0517  Gross per 24 hour   Intake 2220 ml   Output --   Net 2220 ml          Physical Exam  Vitals reviewed.   Constitutional:       General: He is not in acute distress.     Appearance: He is ill-appearing.      Interventions: He is not intubated.  HENT:      Head: Normocephalic and atraumatic.      Right Ear: External ear normal.      Left Ear: External ear normal.      Nose: Nose normal.      Mouth/Throat:      Mouth: Mucous membranes are dry.      Pharynx: Oropharynx is clear.      Comments: Trach in place  Eyes:      Extraocular Movements: Extraocular movements intact.      Conjunctiva/sclera: Conjunctivae normal.      Pupils: Pupils are equal, round, and reactive to light.   Neck:      Comments: trach  Cardiovascular:      Rate and Rhythm: Normal rate.      Heart sounds: Normal heart sounds. No murmur heard.  Pulmonary:       Effort: Pulmonary effort is normal. He is not intubated.      Breath sounds: Rhonchi present. No wheezing or rales.   Abdominal:      General: Abdomen is flat. Bowel sounds are normal.      Palpations: Abdomen is soft.      Comments: peg   Musculoskeletal:         General: Normal range of motion.      Cervical back: Neck supple.      Right lower leg: No edema.      Left lower leg: No edema.   Skin:     General: Skin is warm and dry.      Capillary Refill: Capillary refill takes less than 2 seconds.      Coloration: Skin is not pale.   Neurological:      Mental Status: He is alert.      Comments: Opens eyes spontaneously but does not obey any commands   Psychiatric:         Behavior: Behavior normal.           Review of Systems    Vents:  Vent Mode: AVAPS AE (11/22/23 0508)  Ventilator Initiated: Yes (11/14/23 1802)  Set Rate: 450 BPM (11/20/23 1941)  Vt Set: 450 mL (11/22/23 0508)  Pressure Support: 12 cmH20 (11/20/23 0515)  PEEP/CPAP: 5 cmH20 (11/20/23 0515)  Oxygen Concentration (%): 40 (11/22/23 0745)  Peak Airway Pressure: 15 cmH20 (11/21/23 1710)  Plateau Pressure: 341 cmH20 (11/18/23 0950)  Total Ve: 10.4 L/m (11/22/23 0508)  F/VT Ratio<105 (RSBI): 125 (11/22/23 0314)    Lines/Drains/Airways       Drain  Duration                  Gastrostomy/Enterostomy midline -- days              Airway  Duration             Adult Surgical Airway Portex 8.0 mm DIC cuffed -- days                    Significant Labs:    CBC/Anemia Profile:  Recent Labs   Lab 11/21/23  0430   WBC 6.18   HGB 11.1*   HCT 36.4*   *   MCV 93.3   RDW 18.1*          Chemistries:  Recent Labs   Lab 11/21/23  0430      K 5.1      CO2 27   BUN 14   CREATININE 0.49*   CALCIUM 10.0         All pertinent labs within the past 24 hours have been reviewed.    Significant Imaging:  I have reviewed all pertinent imaging results/findings within the past 24 hours.  Assessment/Plan:     Neuro  Encephalopathy, metabolic  Opens eyes but does not  do anything to command  Likely component of anoxic encephalopathy  He also had low blood sugar which could have contributed  11/22- no significant change. Questionable posturing    Seizures  No recent seizure activity  Patient is on keppra 500 mg bid    Pulmonary  * Acute hypercapnic respiratory failure  Currently on AVAPS 6 hours bid and doing ok  Continue to wean as tolerated  Sputum culture with heavy growth MRSA so Started linezolid bid for 7 days  11/22- afebrile. Will start some trach collar today    Endocrine  Diabetes mellitus  Accuchecks are ok  Currently on no medications  Can monitor blood sugar and if needed add sliding scale                 Craig Estrada, DO  Pulmonology  [facility]

## 2023-11-22 NOTE — SUBJECTIVE & OBJECTIVE
Interval History: No acute events. Currently resting comfortably. Opens eyes spontaneously but does not obey any commands. Afebrile today and vital signs are stable.      Objective:     Vital Signs (Most Recent):  Temp: 97.4 °F (36.3 °C) (11/22/23 0745)  Pulse: (!) 111 (11/22/23 0745)  Resp: (!) 26 (11/22/23 0745)  BP: 115/63 (11/22/23 0745)  SpO2: 95 % (11/22/23 0745) Vital Signs (24h Range):  Temp:  [97.4 °F (36.3 °C)-99.8 °F (37.7 °C)] 97.4 °F (36.3 °C)  Pulse:  [] 111  Resp:  [18-40] 26  SpO2:  [95 %-100 %] 95 %  BP: (115-133)/(63-85) 115/63     Weight: 89.4 kg (197 lb 3.2 oz)  Body mass index is 39.83 kg/m².      Intake/Output Summary (Last 24 hours) at 11/22/2023 0936  Last data filed at 11/22/2023 0517  Gross per 24 hour   Intake 2220 ml   Output --   Net 2220 ml          Physical Exam  Vitals reviewed.   Constitutional:       General: He is not in acute distress.     Appearance: He is ill-appearing.      Interventions: He is not intubated.  HENT:      Head: Normocephalic and atraumatic.      Right Ear: External ear normal.      Left Ear: External ear normal.      Nose: Nose normal.      Mouth/Throat:      Mouth: Mucous membranes are dry.      Pharynx: Oropharynx is clear.      Comments: Trach in place  Eyes:      Extraocular Movements: Extraocular movements intact.      Conjunctiva/sclera: Conjunctivae normal.      Pupils: Pupils are equal, round, and reactive to light.   Neck:      Comments: trach  Cardiovascular:      Rate and Rhythm: Normal rate.      Heart sounds: Normal heart sounds. No murmur heard.  Pulmonary:      Effort: Pulmonary effort is normal. He is not intubated.      Breath sounds: Rhonchi present. No wheezing or rales.   Abdominal:      General: Abdomen is flat. Bowel sounds are normal.      Palpations: Abdomen is soft.      Comments: peg   Musculoskeletal:         General: Normal range of motion.      Cervical back: Neck supple.      Right lower leg: No edema.      Left lower leg: No  edema.   Skin:     General: Skin is warm and dry.      Capillary Refill: Capillary refill takes less than 2 seconds.      Coloration: Skin is not pale.   Neurological:      Mental Status: He is alert.      Comments: Opens eyes spontaneously but does not obey any commands   Psychiatric:         Behavior: Behavior normal.           Review of Systems    Vents:  Vent Mode: AVAPS AE (11/22/23 0508)  Ventilator Initiated: Yes (11/14/23 1802)  Set Rate: 450 BPM (11/20/23 1941)  Vt Set: 450 mL (11/22/23 0508)  Pressure Support: 12 cmH20 (11/20/23 0515)  PEEP/CPAP: 5 cmH20 (11/20/23 0515)  Oxygen Concentration (%): 40 (11/22/23 0745)  Peak Airway Pressure: 15 cmH20 (11/21/23 1710)  Plateau Pressure: 341 cmH20 (11/18/23 0950)  Total Ve: 10.4 L/m (11/22/23 0508)  F/VT Ratio<105 (RSBI): 125 (11/22/23 0314)    Lines/Drains/Airways       Drain  Duration                  Gastrostomy/Enterostomy midline -- days              Airway  Duration             Adult Surgical Airway Portex 8.0 mm DIC cuffed -- days                    Significant Labs:    CBC/Anemia Profile:  Recent Labs   Lab 11/21/23 0430   WBC 6.18   HGB 11.1*   HCT 36.4*   *   MCV 93.3   RDW 18.1*          Chemistries:  Recent Labs   Lab 11/21/23  0430      K 5.1      CO2 27   BUN 14   CREATININE 0.49*   CALCIUM 10.0         All pertinent labs within the past 24 hours have been reviewed.    Significant Imaging:  I have reviewed all pertinent imaging results/findings within the past 24 hours.

## 2023-11-22 NOTE — PROGRESS NOTES
Wt: 197#  No problems noted with TF.  Pt to begin Trach collar today. He is not following commands.  B-167.  Lab reviewed.  Last BM .  Continue POC.

## 2023-11-23 LAB — GLUCOSE SERPL-MCNC: 159 MG/DL (ref 70–105)

## 2023-11-23 PROCEDURE — 27000935

## 2023-11-23 PROCEDURE — 82962 GLUCOSE BLOOD TEST: CPT

## 2023-11-23 PROCEDURE — 25000242 PHARM REV CODE 250 ALT 637 W/ HCPCS: Performed by: INTERNAL MEDICINE

## 2023-11-23 PROCEDURE — 27000941

## 2023-11-23 PROCEDURE — 94640 AIRWAY INHALATION TREATMENT: CPT

## 2023-11-23 PROCEDURE — 94761 N-INVAS EAR/PLS OXIMETRY MLT: CPT

## 2023-11-23 PROCEDURE — 11000004 HC SNF PRIVATE

## 2023-11-23 PROCEDURE — 99900035 HC TECH TIME PER 15 MIN (STAT)

## 2023-11-23 PROCEDURE — 27200966 HC CLOSED SUCTION SYSTEM

## 2023-11-23 PROCEDURE — 94003 VENT MGMT INPAT SUBQ DAY: CPT

## 2023-11-23 PROCEDURE — 25000003 PHARM REV CODE 250: Performed by: REGISTERED NURSE

## 2023-11-23 PROCEDURE — 99900026 HC AIRWAY MAINTENANCE (STAT)

## 2023-11-23 PROCEDURE — 25000003 PHARM REV CODE 250: Performed by: INTERNAL MEDICINE

## 2023-11-23 PROCEDURE — 27000221 HC OXYGEN, UP TO 24 HOURS

## 2023-11-23 PROCEDURE — A6212 FOAM DRG <=16 SQ IN W/BORDER: HCPCS

## 2023-11-23 RX ADMIN — LEVETIRACETAM 500 MG: 500 SOLUTION ORAL at 09:11

## 2023-11-23 RX ADMIN — APIXABAN 5 MG: 2.5 TABLET, FILM COATED ORAL at 09:11

## 2023-11-23 RX ADMIN — IPRATROPIUM BROMIDE AND ALBUTEROL SULFATE 3 ML: 2.5; .5 SOLUTION RESPIRATORY (INHALATION) at 07:11

## 2023-11-23 RX ADMIN — PANTOPRAZOLE SODIUM 40 MG: 40 GRANULE, DELAYED RELEASE ORAL at 09:11

## 2023-11-23 RX ADMIN — APIXABAN 5 MG: 2.5 TABLET, FILM COATED ORAL at 08:11

## 2023-11-23 RX ADMIN — PANTOPRAZOLE SODIUM 40 MG: 40 GRANULE, DELAYED RELEASE ORAL at 08:11

## 2023-11-23 RX ADMIN — LEVETIRACETAM 500 MG: 500 SOLUTION ORAL at 08:11

## 2023-11-23 NOTE — RESPIRATORY THERAPY
Trial #1    0734  Took patient off of Ventilator and placed patient on trach collar.  Vitals were; Heart rate 115, Respirations 27 and O2 SAT fo 100%.  Patient was awake and resting comfortably.      0934  Took patient off of trach collar and placed back on the ventilator on AVAPS mode with 40% FIO2.  Vitals were; Heart rate 108, Respirations 27, and O2 %.  Patient did great on trial, no problems or complications.      Trial #2    1405  Took patient off of ventilator and placed on trach collar. Vitals were; Heart rate 103, Respirations 26 and O2 SAT was 100%.  Patient was awake and resting comfortably      1612  Took patient off of trach collar and placed patient back on the ventilator on AVAPS with FIO2 of 40%.  Vital signs were within the normal range.  Patient was awake and resting comfortably

## 2023-11-24 LAB — GLUCOSE SERPL-MCNC: 158 MG/DL (ref 70–105)

## 2023-11-24 PROCEDURE — 27000941

## 2023-11-24 PROCEDURE — 11000004 HC SNF PRIVATE

## 2023-11-24 PROCEDURE — 82962 GLUCOSE BLOOD TEST: CPT

## 2023-11-24 PROCEDURE — 99900026 HC AIRWAY MAINTENANCE (STAT)

## 2023-11-24 PROCEDURE — 25000242 PHARM REV CODE 250 ALT 637 W/ HCPCS: Performed by: INTERNAL MEDICINE

## 2023-11-24 PROCEDURE — 25000003 PHARM REV CODE 250: Performed by: INTERNAL MEDICINE

## 2023-11-24 PROCEDURE — 99900035 HC TECH TIME PER 15 MIN (STAT)

## 2023-11-24 PROCEDURE — 27000221 HC OXYGEN, UP TO 24 HOURS

## 2023-11-24 PROCEDURE — 27000935

## 2023-11-24 PROCEDURE — 94003 VENT MGMT INPAT SUBQ DAY: CPT

## 2023-11-24 PROCEDURE — 25000003 PHARM REV CODE 250: Performed by: REGISTERED NURSE

## 2023-11-24 PROCEDURE — 94761 N-INVAS EAR/PLS OXIMETRY MLT: CPT

## 2023-11-24 PROCEDURE — 94640 AIRWAY INHALATION TREATMENT: CPT

## 2023-11-24 RX ADMIN — LEVETIRACETAM 500 MG: 500 SOLUTION ORAL at 08:11

## 2023-11-24 RX ADMIN — PANTOPRAZOLE SODIUM 40 MG: 40 GRANULE, DELAYED RELEASE ORAL at 09:11

## 2023-11-24 RX ADMIN — IPRATROPIUM BROMIDE AND ALBUTEROL SULFATE 3 ML: 2.5; .5 SOLUTION RESPIRATORY (INHALATION) at 07:11

## 2023-11-24 RX ADMIN — APIXABAN 5 MG: 2.5 TABLET, FILM COATED ORAL at 08:11

## 2023-11-24 RX ADMIN — LEVETIRACETAM 500 MG: 500 SOLUTION ORAL at 09:11

## 2023-11-24 RX ADMIN — APIXABAN 5 MG: 2.5 TABLET, FILM COATED ORAL at 09:11

## 2023-11-24 RX ADMIN — PANTOPRAZOLE SODIUM 40 MG: 40 GRANULE, DELAYED RELEASE ORAL at 08:11

## 2023-11-24 NOTE — PLAN OF CARE
Patient completed 2 two hour trach collar trials earlier today and is currently on his 3rd trial now. Trach collar 40% for 2 hours. O2 sat on AVAPS 40% was 96%.

## 2023-11-24 NOTE — RESPIRATORY THERAPY
Trial #1    0731 Took patient off of ventilator and placed patient on trach collar. Vital signs were; Heart rate 83, Respirations 19, and O2 SAT of 96%.  Patient was asleep and resting comfortably.      1133  Took patient off of trach collar and placed patient on ventilator on AVAPS mode with FIO2 of 40%.  Vital signs were; Heart Rate 112, Respirations 28, PO %.  Patient was awake and resting comfortably.      Trial #2      1408Took patient off of ventilator and placed patient on trach collar.  Vital signs were;  Heart rate 117, Respirations 35, and O2 SAT was 98%.  Patient was awake and resting comfortably.      1601  Took patient off of trach collar and placed patient back on ventilator on AVAPS mode with FIO2 of 40%.  Vitals were ; heart rate 111, Respirations 33 and O2 SAT of 93%.  Patient was awake and resting comfortably.  Patient did not have any problems or complications during this 4 hour trial

## 2023-11-24 NOTE — PLAN OF CARE
Patient's SAT this AM was 96% on ventilator on AVAPS mode with 40% FIO2        Problem: Device-Related Complication Risk (Mechanical Ventilation, Invasive)  Goal: Optimal Device Function  Outcome: Ongoing, Progressing     Problem: Ventilator-Induced Lung Injury (Mechanical Ventilation, Invasive)  Goal: Absence of Ventilator-Induced Lung Injury  Outcome: Ongoing, Progressing     Problem: Communication Impairment (Artificial Airway)  Goal: Effective Communication  Outcome: Ongoing, Progressing     Problem: Device-Related Complication Risk (Artificial Airway)  Goal: Optimal Device Function  Outcome: Ongoing, Progressing     Problem: Skin and Tissue Injury (Artificial Airway)  Goal: Absence of Device-Related Skin or Tissue Injury  Outcome: Ongoing, Progressing

## 2023-11-25 LAB — GLUCOSE SERPL-MCNC: 164 MG/DL (ref 70–105)

## 2023-11-25 PROCEDURE — 25000003 PHARM REV CODE 250: Performed by: INTERNAL MEDICINE

## 2023-11-25 PROCEDURE — 99900026 HC AIRWAY MAINTENANCE (STAT)

## 2023-11-25 PROCEDURE — 94761 N-INVAS EAR/PLS OXIMETRY MLT: CPT

## 2023-11-25 PROCEDURE — 99900035 HC TECH TIME PER 15 MIN (STAT)

## 2023-11-25 PROCEDURE — 25000003 PHARM REV CODE 250: Performed by: REGISTERED NURSE

## 2023-11-25 PROCEDURE — 94640 AIRWAY INHALATION TREATMENT: CPT

## 2023-11-25 PROCEDURE — 11000004 HC SNF PRIVATE

## 2023-11-25 PROCEDURE — 25000242 PHARM REV CODE 250 ALT 637 W/ HCPCS: Performed by: INTERNAL MEDICINE

## 2023-11-25 PROCEDURE — 82962 GLUCOSE BLOOD TEST: CPT

## 2023-11-25 PROCEDURE — 27000935

## 2023-11-25 PROCEDURE — 27000958

## 2023-11-25 PROCEDURE — 94003 VENT MGMT INPAT SUBQ DAY: CPT

## 2023-11-25 PROCEDURE — 27000221 HC OXYGEN, UP TO 24 HOURS

## 2023-11-25 RX ADMIN — IPRATROPIUM BROMIDE AND ALBUTEROL SULFATE 3 ML: 2.5; .5 SOLUTION RESPIRATORY (INHALATION) at 07:11

## 2023-11-25 RX ADMIN — LEVETIRACETAM 500 MG: 500 SOLUTION ORAL at 08:11

## 2023-11-25 RX ADMIN — APIXABAN 5 MG: 2.5 TABLET, FILM COATED ORAL at 08:11

## 2023-11-25 RX ADMIN — PANTOPRAZOLE SODIUM 40 MG: 40 GRANULE, DELAYED RELEASE ORAL at 08:11

## 2023-11-25 NOTE — CARE UPDATE
Pt completed 6 hour trach collar trial well with no complications. Placed pt back on AVAPS and pt tolerated transition fine. Pt is stable at this time.    [id number]    RR 23

## 2023-11-25 NOTE — CARE UPDATE
Placed pt on trach collar trial at 40% o2. Pts goal today is 6 hours BID. Pt tolerated transition well with only a slight increase in heart rate and respiratory rate from being aroused and stimulated. Pt is stable at this time.    Spo2 98%    RR 25

## 2023-11-25 NOTE — PLAN OF CARE
Patient was placed on 4 hour trach collar trial 40%. He completed 2 four hour trials earlier today. He seems to be tolerating treatment well.

## 2023-11-25 NOTE — CARE UPDATE
Placed pt on second 6 hour trach collar trial. Pt tolerated transition well. Pt is stable at this time.     Spo2 99%    RR 22

## 2023-11-26 LAB — GLUCOSE SERPL-MCNC: 170 MG/DL (ref 70–105)

## 2023-11-26 PROCEDURE — 27000221 HC OXYGEN, UP TO 24 HOURS

## 2023-11-26 PROCEDURE — 27000935

## 2023-11-26 PROCEDURE — 25000003 PHARM REV CODE 250: Performed by: REGISTERED NURSE

## 2023-11-26 PROCEDURE — 25000003 PHARM REV CODE 250: Performed by: INTERNAL MEDICINE

## 2023-11-26 PROCEDURE — 94761 N-INVAS EAR/PLS OXIMETRY MLT: CPT

## 2023-11-26 PROCEDURE — 99900035 HC TECH TIME PER 15 MIN (STAT)

## 2023-11-26 PROCEDURE — 94003 VENT MGMT INPAT SUBQ DAY: CPT

## 2023-11-26 PROCEDURE — 27200966 HC CLOSED SUCTION SYSTEM

## 2023-11-26 PROCEDURE — 82962 GLUCOSE BLOOD TEST: CPT

## 2023-11-26 PROCEDURE — 11000004 HC SNF PRIVATE

## 2023-11-26 PROCEDURE — 94640 AIRWAY INHALATION TREATMENT: CPT

## 2023-11-26 PROCEDURE — 27000716 HC OXISENSOR PROBE, ANY SIZE

## 2023-11-26 PROCEDURE — 27000958

## 2023-11-26 PROCEDURE — 25000242 PHARM REV CODE 250 ALT 637 W/ HCPCS: Performed by: INTERNAL MEDICINE

## 2023-11-26 PROCEDURE — 99900026 HC AIRWAY MAINTENANCE (STAT)

## 2023-11-26 RX ADMIN — PANTOPRAZOLE SODIUM 40 MG: 40 GRANULE, DELAYED RELEASE ORAL at 09:11

## 2023-11-26 RX ADMIN — APIXABAN 5 MG: 2.5 TABLET, FILM COATED ORAL at 09:11

## 2023-11-26 RX ADMIN — APIXABAN 5 MG: 2.5 TABLET, FILM COATED ORAL at 08:11

## 2023-11-26 RX ADMIN — IPRATROPIUM BROMIDE AND ALBUTEROL SULFATE 3 ML: 2.5; .5 SOLUTION RESPIRATORY (INHALATION) at 07:11

## 2023-11-26 RX ADMIN — LEVETIRACETAM 500 MG: 500 SOLUTION ORAL at 08:11

## 2023-11-26 RX ADMIN — PANTOPRAZOLE SODIUM 40 MG: 40 GRANULE, DELAYED RELEASE ORAL at 08:11

## 2023-11-26 RX ADMIN — LEVETIRACETAM 500 MG: 500 SOLUTION ORAL at 09:11

## 2023-11-26 NOTE — PLAN OF CARE
Care plan reviewed  Problem: Adult Inpatient Plan of Care  Goal: Plan of Care Review  Outcome: Ongoing, Progressing     Problem: Adult Inpatient Plan of Care  Goal: Patient-Specific Goal (Individualized)  Outcome: Ongoing, Progressing     Problem: Adult Inpatient Plan of Care  Goal: Absence of Hospital-Acquired Illness or Injury  Outcome: Ongoing, Progressing     Problem: Adult Inpatient Plan of Care  Goal: Absence of Hospital-Acquired Illness or Injury  Outcome: Ongoing, Progressing     Problem: Adult Inpatient Plan of Care  Goal: Optimal Comfort and Wellbeing  Outcome: Ongoing, Progressing

## 2023-11-26 NOTE — RESPIRATORY THERAPY
0727  Took patient off of ventilator and placed patient on trach collar at 40% FIO2.  Vitals were; Heart rate 127, Respirations 31 and O2 SAT of 95%.  Patient was awake and nurses had just change him and his bedding due to patient's feeding tube coming disconnected.  Patient was a little anxious due to all the moving him around.      1534  Took patient off of trach collar and placed back on ventilator on AVAPS mode at 40% FIO2.  Vitals were; Heart rate 93, Respirations 22 and O2 AT of 93%.  Patient was asleep and resting comfortably.  Patient did a 8 hour trach collar trial without any problems or complications.

## 2023-11-26 NOTE — PLAN OF CARE
Patient's SAT this AM was 95% on AVAPS with 40% FIO2        Problem: Device-Related Complication Risk (Mechanical Ventilation, Invasive)  Goal: Optimal Device Function  Outcome: Ongoing, Progressing     Problem: Skin and Tissue Injury (Mechanical Ventilation, Invasive)  Goal: Absence of Device-Related Skin and Tissue Injury  Outcome: Ongoing, Progressing     Problem: Ventilator-Induced Lung Injury (Mechanical Ventilation, Invasive)  Goal: Absence of Ventilator-Induced Lung Injury  Outcome: Ongoing, Progressing     Problem: Device-Related Complication Risk (Artificial Airway)  Goal: Optimal Device Function  Outcome: Ongoing, Progressing     Problem: Skin and Tissue Injury (Artificial Airway)  Goal: Absence of Device-Related Skin or Tissue Injury  Outcome: Ongoing, Progressing     Problem: Noninvasive Ventilation Acute  Goal: Effective Unassisted Ventilation and Oxygenation  Outcome: Ongoing, Progressing

## 2023-11-27 LAB — GLUCOSE SERPL-MCNC: 184 MG/DL (ref 70–105)

## 2023-11-27 PROCEDURE — 27000935

## 2023-11-27 PROCEDURE — 25000242 PHARM REV CODE 250 ALT 637 W/ HCPCS: Performed by: INTERNAL MEDICINE

## 2023-11-27 PROCEDURE — 82962 GLUCOSE BLOOD TEST: CPT

## 2023-11-27 PROCEDURE — 94640 AIRWAY INHALATION TREATMENT: CPT

## 2023-11-27 PROCEDURE — 25000003 PHARM REV CODE 250: Performed by: INTERNAL MEDICINE

## 2023-11-27 PROCEDURE — 94761 N-INVAS EAR/PLS OXIMETRY MLT: CPT

## 2023-11-27 PROCEDURE — 99900026 HC AIRWAY MAINTENANCE (STAT)

## 2023-11-27 PROCEDURE — 94003 VENT MGMT INPAT SUBQ DAY: CPT

## 2023-11-27 PROCEDURE — 11000004 HC SNF PRIVATE

## 2023-11-27 PROCEDURE — 99900035 HC TECH TIME PER 15 MIN (STAT)

## 2023-11-27 PROCEDURE — 25000003 PHARM REV CODE 250: Performed by: REGISTERED NURSE

## 2023-11-27 PROCEDURE — 27000221 HC OXYGEN, UP TO 24 HOURS

## 2023-11-27 PROCEDURE — 27000958

## 2023-11-27 RX ADMIN — PANTOPRAZOLE SODIUM 40 MG: 40 GRANULE, DELAYED RELEASE ORAL at 08:11

## 2023-11-27 RX ADMIN — LEVETIRACETAM 500 MG: 500 SOLUTION ORAL at 08:11

## 2023-11-27 RX ADMIN — APIXABAN 5 MG: 2.5 TABLET, FILM COATED ORAL at 08:11

## 2023-11-27 RX ADMIN — IPRATROPIUM BROMIDE AND ALBUTEROL SULFATE 3 ML: 2.5; .5 SOLUTION RESPIRATORY (INHALATION) at 07:11

## 2023-11-27 NOTE — RESPIRATORY THERAPY
0736  Took patient off of ventilator and placed patient on trach collar at 40% FIO2.  Vitals were;  Heart rate 82, Respirations 18 and O2 SAT of 99% .. Patient was asleep and resting comfortably.  We will try for a 12 hour trach collar trial.

## 2023-11-27 NOTE — PLAN OF CARE
Patient's SAT this AM was 99% on AVAPS mode with 40% FIO2.          Problem: Device-Related Complication Risk (Mechanical Ventilation, Invasive)  Goal: Optimal Device Function  Outcome: Ongoing, Progressing     Problem: Skin and Tissue Injury (Mechanical Ventilation, Invasive)  Goal: Absence of Device-Related Skin and Tissue Injury  Outcome: Ongoing, Progressing     Problem: Ventilator-Induced Lung Injury (Mechanical Ventilation, Invasive)  Goal: Absence of Ventilator-Induced Lung Injury  Outcome: Ongoing, Progressing     Problem: Device-Related Complication Risk (Artificial Airway)  Goal: Optimal Device Function  Outcome: Ongoing, Progressing     Problem: Skin and Tissue Injury (Artificial Airway)  Goal: Absence of Device-Related Skin or Tissue Injury  Outcome: Ongoing, Progressing

## 2023-11-27 NOTE — PROGRESS NOTES
Wt: 200# No problems noted with TF.  No new lab.  Pt continues to do TC trials.  Last BM 11/25.  Continue NS.

## 2023-11-27 NOTE — PLAN OF CARE
Problem: Adult Inpatient Plan of Care  Goal: Plan of Care Review  Outcome: Ongoing, Progressing     Problem: Inability to Wean (Mechanical Ventilation, Invasive)  Goal: Mechanical Ventilation Liberation  Outcome: Ongoing, Progressing     Problem: Skin and Tissue Injury (Mechanical Ventilation, Invasive)  Goal: Absence of Device-Related Skin and Tissue Injury  Outcome: Ongoing, Progressing     Problem: Infection  Goal: Absence of Infection Signs and Symptoms  Outcome: Ongoing, Progressing

## 2023-11-27 NOTE — PLAN OF CARE
Problem: Adult Inpatient Plan of Care  Goal: Plan of Care Review  Outcome: Ongoing, Progressing     Problem: Adult Inpatient Plan of Care  Goal: Optimal Comfort and Wellbeing  Outcome: Ongoing, Progressing     Problem: Nutrition Impairment (Mechanical Ventilation, Invasive)  Goal: Optimal Nutrition Delivery  Outcome: Ongoing, Progressing

## 2023-11-28 LAB
ANION GAP SERPL CALCULATED.3IONS-SCNC: 13 MMOL/L (ref 7–16)
BASOPHILS # BLD AUTO: 0.04 K/UL (ref 0–0.2)
BASOPHILS NFR BLD AUTO: 0.5 % (ref 0–1)
BUN SERPL-MCNC: 13 MG/DL (ref 7–18)
BUN/CREAT SERPL: 25 (ref 6–20)
CALCIUM SERPL-MCNC: 9.1 MG/DL (ref 8.5–10.1)
CHLORIDE SERPL-SCNC: 102 MMOL/L (ref 98–107)
CO2 SERPL-SCNC: 25 MMOL/L (ref 21–32)
CREAT SERPL-MCNC: 0.51 MG/DL (ref 0.7–1.3)
DIFFERENTIAL METHOD BLD: ABNORMAL
EGFR (NO RACE VARIABLE) (RUSH/TITUS): 139 ML/MIN/1.73M2
EOSINOPHIL # BLD AUTO: 0.22 K/UL (ref 0–0.5)
EOSINOPHIL NFR BLD AUTO: 2.9 % (ref 1–4)
ERYTHROCYTE [DISTWIDTH] IN BLOOD BY AUTOMATED COUNT: 17.6 % (ref 11.5–14.5)
GLUCOSE SERPL-MCNC: 136 MG/DL (ref 74–106)
HCT VFR BLD AUTO: 29.6 % (ref 40–54)
HGB BLD-MCNC: 8.9 G/DL (ref 13.5–18)
LYMPHOCYTES # BLD AUTO: 1.47 K/UL (ref 1–4.8)
LYMPHOCYTES NFR BLD AUTO: 19.7 % (ref 27–41)
MCH RBC QN AUTO: 27.9 PG (ref 27–31)
MCHC RBC AUTO-ENTMCNC: 30.1 G/DL (ref 32–36)
MCV RBC AUTO: 92.8 FL (ref 80–96)
MONOCYTES # BLD AUTO: 0.65 K/UL (ref 0–0.8)
MONOCYTES NFR BLD AUTO: 8.7 % (ref 2–6)
MPC BLD CALC-MCNC: 9.3 FL (ref 9.4–12.4)
NEUTROPHILS # BLD AUTO: 5.1 K/UL (ref 1.8–7.7)
NEUTROPHILS NFR BLD AUTO: 68.2 % (ref 53–65)
PLATELET # BLD AUTO: 554 K/UL (ref 150–400)
POTASSIUM SERPL-SCNC: 3.9 MMOL/L (ref 3.5–5.1)
RBC # BLD AUTO: 3.19 M/UL (ref 4.6–6.2)
SODIUM SERPL-SCNC: 136 MMOL/L (ref 136–145)
WBC # BLD AUTO: 7.48 K/UL (ref 4.5–11)

## 2023-11-28 PROCEDURE — 25000003 PHARM REV CODE 250: Performed by: INTERNAL MEDICINE

## 2023-11-28 PROCEDURE — 80048 BASIC METABOLIC PNL TOTAL CA: CPT | Performed by: INTERNAL MEDICINE

## 2023-11-28 PROCEDURE — 85025 COMPLETE CBC W/AUTO DIFF WBC: CPT | Performed by: INTERNAL MEDICINE

## 2023-11-28 PROCEDURE — A6212 FOAM DRG <=16 SQ IN W/BORDER: HCPCS

## 2023-11-28 PROCEDURE — 25000003 PHARM REV CODE 250: Performed by: REGISTERED NURSE

## 2023-11-28 PROCEDURE — 94761 N-INVAS EAR/PLS OXIMETRY MLT: CPT

## 2023-11-28 PROCEDURE — 94760 N-INVAS EAR/PLS OXIMETRY 1: CPT

## 2023-11-28 PROCEDURE — 27000221 HC OXYGEN, UP TO 24 HOURS

## 2023-11-28 PROCEDURE — 25000242 PHARM REV CODE 250 ALT 637 W/ HCPCS: Performed by: INTERNAL MEDICINE

## 2023-11-28 PROCEDURE — 27000941

## 2023-11-28 PROCEDURE — 94640 AIRWAY INHALATION TREATMENT: CPT

## 2023-11-28 PROCEDURE — 99900026 HC AIRWAY MAINTENANCE (STAT)

## 2023-11-28 PROCEDURE — 27000935

## 2023-11-28 PROCEDURE — 99309 SBSQ NF CARE MODERATE MDM 30: CPT | Mod: ,,, | Performed by: INTERNAL MEDICINE

## 2023-11-28 PROCEDURE — 11000004 HC SNF PRIVATE

## 2023-11-28 PROCEDURE — 99900035 HC TECH TIME PER 15 MIN (STAT)

## 2023-11-28 RX ORDER — CEFUROXIME AXETIL 250 MG/1
500 TABLET ORAL EVERY 12 HOURS
Status: COMPLETED | OUTPATIENT
Start: 2023-11-28 | End: 2023-12-01

## 2023-11-28 RX ORDER — POTASSIUM IODIDE 1 G/ML
5 SOLUTION ORAL 4 TIMES DAILY
Status: DISCONTINUED | OUTPATIENT
Start: 2023-11-28 | End: 2024-01-31 | Stop reason: HOSPADM

## 2023-11-28 RX ORDER — POTASSIUM IODIDE 1 G/ML
5 SOLUTION ORAL 4 TIMES DAILY
Status: DISCONTINUED | OUTPATIENT
Start: 2023-11-28 | End: 2023-11-28

## 2023-11-28 RX ADMIN — PANTOPRAZOLE SODIUM 40 MG: 40 GRANULE, DELAYED RELEASE ORAL at 08:11

## 2023-11-28 RX ADMIN — POTASSIUM IODIDE 5 DROP: 1 SOLUTION ORAL at 08:11

## 2023-11-28 RX ADMIN — CEFUROXIME AXETIL 500 MG: 250 TABLET, FILM COATED ORAL at 08:11

## 2023-11-28 RX ADMIN — LEVETIRACETAM 500 MG: 500 SOLUTION ORAL at 08:11

## 2023-11-28 RX ADMIN — APIXABAN 5 MG: 2.5 TABLET, FILM COATED ORAL at 08:11

## 2023-11-28 RX ADMIN — IPRATROPIUM BROMIDE AND ALBUTEROL SULFATE 3 ML: 2.5; .5 SOLUTION RESPIRATORY (INHALATION) at 08:11

## 2023-11-28 RX ADMIN — CEFUROXIME AXETIL 500 MG: 250 TABLET, FILM COATED ORAL at 12:11

## 2023-11-28 RX ADMIN — POTASSIUM IODIDE 5 DROP: 1 SOLUTION ORAL at 05:11

## 2023-11-28 RX ADMIN — PANTOPRAZOLE SODIUM 40 MG: 40 GRANULE, DELAYED RELEASE ORAL at 09:11

## 2023-11-28 RX ADMIN — LEVETIRACETAM 500 MG: 500 SOLUTION ORAL at 09:11

## 2023-11-28 RX ADMIN — IPRATROPIUM BROMIDE AND ALBUTEROL SULFATE 3 ML: 2.5; .5 SOLUTION RESPIRATORY (INHALATION) at 07:11

## 2023-11-28 RX ADMIN — POTASSIUM IODIDE 5 DROP: 1 SOLUTION ORAL at 12:11

## 2023-11-28 RX ADMIN — APIXABAN 5 MG: 2.5 TABLET, FILM COATED ORAL at 09:11

## 2023-11-28 NOTE — ASSESSMENT & PLAN NOTE
Patient tolerating 16 hours of trach collar day we need to do his secretions will place on antibiotic some SSKI

## 2023-11-28 NOTE — PLAN OF CARE
Patient's SAT this Am was 95% on the ventilator on AVAPS mode.        Problem: Device-Related Complication Risk (Mechanical Ventilation, Invasive)  Goal: Optimal Device Function  Outcome: Ongoing, Progressing     Problem: Skin and Tissue Injury (Mechanical Ventilation, Invasive)  Goal: Absence of Device-Related Skin and Tissue Injury  Outcome: Ongoing, Progressing     Problem: Ventilator-Induced Lung Injury (Mechanical Ventilation, Invasive)  Goal: Absence of Ventilator-Induced Lung Injury  Outcome: Ongoing, Progressing     Problem: Device-Related Complication Risk (Artificial Airway)  Goal: Optimal Device Function  Outcome: Ongoing, Progressing     Problem: Skin and Tissue Injury (Artificial Airway)  Goal: Absence of Device-Related Skin or Tissue Injury  Outcome: Ongoing, Progressing

## 2023-11-28 NOTE — RESPIRATORY THERAPY
0736  Took patient off of ventilator and placed patient on trach collar.  Vitals were; heart rate 92, respirations 19, and O2 SAT 95% while asleep.  We will attempt to do 16 hours today on the trach collar

## 2023-11-28 NOTE — CARE UPDATE
1946 Placed patient back on avaps vt 450 @ 40%. Patient did 12 hour TC trial with no complications.  RR 23 Sats 100%. CH crt

## 2023-11-28 NOTE — SUBJECTIVE & OBJECTIVE
Interval History:  No complaints    Review of Systems  Objective:     Vital Signs (Most Recent):  Temp: 99.9 °F (37.7 °C) (11/28/23 0749)  Pulse: 87 (11/28/23 0749)  Resp: 13 (11/28/23 0749)  BP: (!) 129/58 (11/28/23 0749)  SpO2: 100 % (11/28/23 0749) Vital Signs (24h Range):  Temp:  [97.6 °F (36.4 °C)-99.9 °F (37.7 °C)] 99.9 °F (37.7 °C)  Pulse:  [] 87  Resp:  [13-35] 13  SpO2:  [93 %-100 %] 100 %  BP: (102-129)/(54-76) 129/58     Weight: 89.9 kg (198 lb 3.2 oz)  Body mass index is 40.03 kg/m².    Intake/Output Summary (Last 24 hours) at 11/28/2023 1047  Last data filed at 11/28/2023 0920  Gross per 24 hour   Intake 2190 ml   Output --   Net 2190 ml         Physical Exam  Vitals reviewed.   Constitutional:       Appearance: Normal appearance.      Interventions: He is not intubated.     Comments: Tracheostomy   HENT:      Head: Normocephalic and atraumatic.      Nose: Nose normal.      Mouth/Throat:      Mouth: Mucous membranes are dry.      Pharynx: Oropharynx is clear.   Eyes:      Extraocular Movements: Extraocular movements intact.      Conjunctiva/sclera: Conjunctivae normal.      Pupils: Pupils are equal, round, and reactive to light.   Cardiovascular:      Rate and Rhythm: Normal rate.      Heart sounds: Normal heart sounds. No murmur heard.  Pulmonary:      Effort: Pulmonary effort is normal. He is not intubated.      Breath sounds: Normal breath sounds.   Abdominal:      General: Abdomen is flat. Bowel sounds are normal.      Palpations: Abdomen is soft.   Musculoskeletal:         General: Normal range of motion.      Cervical back: Normal range of motion and neck supple.      Right lower leg: No edema.      Left lower leg: No edema.   Skin:     General: Skin is warm and dry.      Capillary Refill: Capillary refill takes less than 2 seconds.   Neurological:      General: No focal deficit present.      Mental Status: He is alert and oriented to person, place, and time.   Psychiatric:         Mood and  Affect: Mood normal.         Behavior: Behavior normal.             Significant Labs: All pertinent labs within the past 24 hours have been reviewed.  Recent Lab Results         11/28/23  0559        Anion Gap 13       Baso # 0.04       Basophil % 0.5       BUN 13       BUN/CREAT RATIO 25       Calcium 9.1       Chloride 102       CO2 25       Creatinine 0.51       Differential Method Scan Smear       eGFR 139       Eos # 0.22       Eosinophil % 2.9       Glucose 136       Hematocrit 29.6       Hemoglobin 8.9       Lymph # 1.47       Lymph % 19.7       MCH 27.9       MCHC 30.1       MCV 92.8       Mono # 0.65       Mono % 8.7       MPV 9.3       Neutrophils, Abs 5.10       Neutrophils Relative 68.2       Platelet Count 554       Potassium 3.9       RBC 3.19       RDW 17.6       Sodium 136       WBC 7.48               Significant Imaging: I have reviewed all pertinent imaging results/findings within the past 24 hours.

## 2023-11-28 NOTE — PROGRESS NOTES
[facility] - [facility] Surgical Unit  Hospital Medicine  Progress Note    Patient Name: Blue Abdul  MRN: [id number]  Patient Class: IP- Swing   Admission Date: 11/9/2023  Length of Stay: 19 days  Attending Physician: Ramiro Flores MD  Primary Care Provider: Nati Doyle FNP        Subjective:     Principal Problem:Acute hypercapnic respiratory failure        HPI:  33 y-old AAM with PMH of Trisomy 21, asthma, DM, GERD, and LILLIE. Patient presented to an outside hospital system on 09/08/2023 for behavioral issues r/t medication changes. While at the hospital he experienced a hypoglycemic episode with aspiration and was coded. Patient developed new onset seizure at that time with MRI showing cerebral edema. Patient experienced difficulty with vent weaning hattie trach was placed on 09/27/2023, PEG tube placed on 10/05/2023. Patient is admitted to [facility] for continued vent weaning, PT/OT eval and treatment.     Overview/Hospital Course:  No notes on file    Interval History:  No complaints    Review of Systems  Objective:     Vital Signs (Most Recent):  Temp: 99.9 °F (37.7 °C) (11/28/23 0749)  Pulse: 87 (11/28/23 0749)  Resp: 13 (11/28/23 0749)  BP: (!) 129/58 (11/28/23 0749)  SpO2: 100 % (11/28/23 0749) Vital Signs (24h Range):  Temp:  [97.6 °F (36.4 °C)-99.9 °F (37.7 °C)] 99.9 °F (37.7 °C)  Pulse:  [] 87  Resp:  [13-35] 13  SpO2:  [93 %-100 %] 100 %  BP: (102-129)/(54-76) 129/58     Weight: 89.9 kg (198 lb 3.2 oz)  Body mass index is 40.03 kg/m².    Intake/Output Summary (Last 24 hours) at 11/28/2023 1047  Last data filed at 11/28/2023 0920  Gross per 24 hour   Intake 2190 ml   Output --   Net 2190 ml         Physical Exam  Vitals reviewed.   Constitutional:       Appearance: Normal appearance.      Interventions: He is not intubated.     Comments: Tracheostomy   HENT:      Head: Normocephalic and atraumatic.      Nose: Nose normal.      Mouth/Throat:      Mouth: Mucous  membranes are dry.      Pharynx: Oropharynx is clear.   Eyes:      Extraocular Movements: Extraocular movements intact.      Conjunctiva/sclera: Conjunctivae normal.      Pupils: Pupils are equal, round, and reactive to light.   Cardiovascular:      Rate and Rhythm: Normal rate.      Heart sounds: Normal heart sounds. No murmur heard.  Pulmonary:      Effort: Pulmonary effort is normal. He is not intubated.      Breath sounds: Normal breath sounds.   Abdominal:      General: Abdomen is flat. Bowel sounds are normal.      Palpations: Abdomen is soft.   Musculoskeletal:         General: Normal range of motion.      Cervical back: Normal range of motion and neck supple.      Right lower leg: No edema.      Left lower leg: No edema.   Skin:     General: Skin is warm and dry.      Capillary Refill: Capillary refill takes less than 2 seconds.   Neurological:      General: No focal deficit present.      Mental Status: He is alert and oriented to person, place, and time.   Psychiatric:         Mood and Affect: Mood normal.         Behavior: Behavior normal.             Significant Labs: All pertinent labs within the past 24 hours have been reviewed.  Recent Lab Results         11/28/23  0559        Anion Gap 13       Baso # 0.04       Basophil % 0.5       BUN 13       BUN/CREAT RATIO 25       Calcium 9.1       Chloride 102       CO2 25       Creatinine 0.51       Differential Method Scan Smear       eGFR 139       Eos # 0.22       Eosinophil % 2.9       Glucose 136       Hematocrit 29.6       Hemoglobin 8.9       Lymph # 1.47       Lymph % 19.7       MCH 27.9       MCHC 30.1       MCV 92.8       Mono # 0.65       Mono % 8.7       MPV 9.3       Neutrophils, Abs 5.10       Neutrophils Relative 68.2       Platelet Count 554       Potassium 3.9       RBC 3.19       RDW 17.6       Sodium 136       WBC 7.48               Significant Imaging: I have reviewed all pertinent imaging results/findings within the past 24  hours.    Assessment/Plan:      * Acute hypercapnic respiratory failure  Patient tolerating 16 hours of trach collar day we need to do his secretions will place on antibiotic some SSKI    Encephalopathy, metabolic  Seems to be a little bit better still some confusion      Muscle weakness  Start PT and OT      Seizures  MRI showed encephalopathy  Keppra  Ativan PRN      Diabetes mellitus  Currently good control with sliding scale      VTE Risk Mitigation (From admission, onward)           Ordered     apixaban tablet 5 mg  2 times daily         11/09/23 2008     IP VTE HIGH RISK PATIENT  Once         11/09/23 1641     Place NORMA hose  Until discontinued         11/09/23 1641     Place sequential compression device  Until discontinued         11/09/23 1641                    Discharge Planning   CRICKET:      Code Status: Full Code   Is the patient medically ready for discharge?:     Reason for patient still in hospital (select all that apply): Patient new problem, Patient trending condition, Laboratory test, Treatment, and Imaging  Discharge Plan A: Home with family, Home Health   Discharge Delays: None known at this time              Ramiro Flores MD  Department of Hospital Medicine   [facility] - [facility]

## 2023-11-29 LAB — GLUCOSE SERPL-MCNC: 184 MG/DL (ref 70–105)

## 2023-11-29 PROCEDURE — 27000221 HC OXYGEN, UP TO 24 HOURS

## 2023-11-29 PROCEDURE — 25000242 PHARM REV CODE 250 ALT 637 W/ HCPCS: Performed by: INTERNAL MEDICINE

## 2023-11-29 PROCEDURE — 99900035 HC TECH TIME PER 15 MIN (STAT)

## 2023-11-29 PROCEDURE — 27000941

## 2023-11-29 PROCEDURE — 25000003 PHARM REV CODE 250: Performed by: REGISTERED NURSE

## 2023-11-29 PROCEDURE — 94003 VENT MGMT INPAT SUBQ DAY: CPT

## 2023-11-29 PROCEDURE — 94761 N-INVAS EAR/PLS OXIMETRY MLT: CPT

## 2023-11-29 PROCEDURE — 25000003 PHARM REV CODE 250: Performed by: INTERNAL MEDICINE

## 2023-11-29 PROCEDURE — 82962 GLUCOSE BLOOD TEST: CPT

## 2023-11-29 PROCEDURE — 27000935

## 2023-11-29 PROCEDURE — 11000004 HC SNF PRIVATE

## 2023-11-29 PROCEDURE — 99900026 HC AIRWAY MAINTENANCE (STAT)

## 2023-11-29 PROCEDURE — 94640 AIRWAY INHALATION TREATMENT: CPT

## 2023-11-29 RX ADMIN — ALBUTEROL SULFATE 2.5 MG: 2.5 SOLUTION RESPIRATORY (INHALATION) at 01:11

## 2023-11-29 RX ADMIN — POTASSIUM IODIDE 5 DROP: 1 SOLUTION ORAL at 05:11

## 2023-11-29 RX ADMIN — CEFUROXIME AXETIL 500 MG: 250 TABLET, FILM COATED ORAL at 09:11

## 2023-11-29 RX ADMIN — IPRATROPIUM BROMIDE AND ALBUTEROL SULFATE 3 ML: 2.5; .5 SOLUTION RESPIRATORY (INHALATION) at 07:11

## 2023-11-29 RX ADMIN — APIXABAN 5 MG: 2.5 TABLET, FILM COATED ORAL at 09:11

## 2023-11-29 RX ADMIN — PANTOPRAZOLE SODIUM 40 MG: 40 GRANULE, DELAYED RELEASE ORAL at 09:11

## 2023-11-29 RX ADMIN — LEVETIRACETAM 500 MG: 500 SOLUTION ORAL at 09:11

## 2023-11-29 RX ADMIN — POTASSIUM IODIDE 5 DROP: 1 SOLUTION ORAL at 01:11

## 2023-11-29 RX ADMIN — POTASSIUM IODIDE 5 DROP: 1 SOLUTION ORAL at 08:11

## 2023-11-29 RX ADMIN — LEVETIRACETAM 500 MG: 500 SOLUTION ORAL at 08:11

## 2023-11-29 RX ADMIN — PANTOPRAZOLE SODIUM 40 MG: 40 GRANULE, DELAYED RELEASE ORAL at 08:11

## 2023-11-29 RX ADMIN — POTASSIUM IODIDE 5 DROP: 1 SOLUTION ORAL at 09:11

## 2023-11-29 RX ADMIN — APIXABAN 5 MG: 2.5 TABLET, FILM COATED ORAL at 08:11

## 2023-11-29 RX ADMIN — CEFUROXIME AXETIL 500 MG: 250 TABLET, FILM COATED ORAL at 08:11

## 2023-11-29 NOTE — CARE UPDATE
Pt. Using abdominal muscles and has coarse breath sounds with some exp. Wheezing.  Gave pt. PRN tx.which did not help.  , RR 33 and upper airway exp. Wheezing.   Placed him back on AVAPS vent for an hour to rest.

## 2023-11-29 NOTE — CARE UPDATE
Placed pt. Back on trach collar to complete 16 hr trial.  Breathing had returned  to normal.  HR incr. To 124 and RR to 30 after placing on Trach collar.  Loud upper airway expiratory breathing returned but diminished and RR lowered to 25 after a few min.

## 2023-11-29 NOTE — PLAN OF CARE
Patient does not wake or respond to voice or touch.  HR 82, RR 16, o2 sat 96% on 40% AVPAS vent setting with slightly coarse bilateral breath sounds.    Problem: Communication Impairment (Mechanical Ventilation, Invasive)  Goal: Effective Communication  Outcome: Ongoing, Progressing  Intervention: Ensure Effective Communication  Flowsheets (Taken 11/29/2023 0732)  Communication Enhancement Strategies:   family involved in communication plan   family/caregiver assisted with communication     Problem: Device-Related Complication Risk (Mechanical Ventilation, Invasive)  Goal: Optimal Device Function  Intervention: Optimize Device Care and Function  Flowsheets (Taken 11/29/2023 0732)  Airway Safety Measures:   manual resuscitator/mask at bedside   suction at bedside     Problem: Inability to Wean (Mechanical Ventilation, Invasive)  Goal: Mechanical Ventilation Liberation  Outcome: Ongoing, Progressing  Intervention: Promote Extubation and Mechanical Ventilation Liberation  Flowsheets (Taken 11/29/2023 0732)  Environmental Support:   calm environment promoted   caregiver consistency promoted     Problem: Skin and Tissue Injury (Mechanical Ventilation, Invasive)  Goal: Absence of Device-Related Skin and Tissue Injury  Outcome: Ongoing, Progressing  Intervention: Maintain Skin and Tissue Health  Flowsheets (Taken 11/29/2023 0732)  Device Skin Pressure Protection: absorbent pad utilized/changed     Problem: Ventilator-Induced Lung Injury (Mechanical Ventilation, Invasive)  Goal: Absence of Ventilator-Induced Lung Injury  Outcome: Ongoing, Progressing  Intervention: Facilitate Lung-Protection Measures  Flowsheets (Taken 11/29/2023 0732)  Lung Protection Measures:   low inspiratory pressure provided   low tidal volume provided   lung compliance monitored   ventilator synchrony promoted  Intervention: Prevent Ventilator-Associated Pneumonia  Flowsheets (Taken 11/29/2023 0732)  Head of Bed (HOB) Positioning: HOB at 30  degrees  Oral Care:   swabbed with antiseptic solution   lip/mouth moisturizer applied   suction provided   teeth brushed     Problem: Communication Impairment (Artificial Airway)  Goal: Effective Communication  Outcome: Ongoing, Progressing  Intervention: Ensure Effective Communication  Flowsheets (Taken 11/29/2023 0732)  Communication Enhancement Strategies:   family involved in communication plan   family/caregiver assisted with communication  Family/Support System Care:   caregiver stress acknowledged   family care conference arranged  Trust Relationship/Rapport:   care explained   reassurance provided     Problem: Device-Related Complication Risk (Artificial Airway)  Goal: Optimal Device Function  Outcome: Ongoing, Progressing  Intervention: Optimize Device Care and Function  Flowsheets (Taken 11/29/2023 0732)  Airway/Ventilation Management:   airway patency maintained   calming measures promoted   humidification applied   pulmonary hygiene promoted  Airway Safety Measures:   manual resuscitator/mask at bedside   suction at bedside  Oral Care:   swabbed with antiseptic solution   lip/mouth moisturizer applied   suction provided   teeth brushed     Problem: Skin and Tissue Injury (Artificial Airway)  Goal: Absence of Device-Related Skin or Tissue Injury  Outcome: Ongoing, Progressing  Intervention: Maintain Skin and Tissue Health  Flowsheets (Taken 11/29/2023 0732)  Device Skin Pressure Protection: absorbent pad utilized/changed     Problem: Noninvasive Ventilation Acute  Goal: Effective Unassisted Ventilation and Oxygenation  Outcome: Ongoing, Progressing  Intervention: Monitor and Manage Noninvasive Ventilation  Flowsheets (Taken 11/29/2023 0732)  Airway/Ventilation Management:   airway patency maintained   calming measures promoted   humidification applied   pulmonary hygiene promoted

## 2023-11-29 NOTE — CARE UPDATE
2340 Placed patient back on avaps  @ 40%.  Patient did 16 hour TC trial @ 40% with no complications.  RR 29 Sats 100%. CH crt

## 2023-11-29 NOTE — CARE UPDATE
Placed patient on trach collar for 16 Hr. Per Dr. Flores. , RR 26 and o2 sat. 96% on 40% trach collar

## 2023-11-30 LAB — GLUCOSE SERPL-MCNC: 145 MG/DL (ref 70–105)

## 2023-11-30 PROCEDURE — 94640 AIRWAY INHALATION TREATMENT: CPT

## 2023-11-30 PROCEDURE — 25000003 PHARM REV CODE 250: Performed by: REGISTERED NURSE

## 2023-11-30 PROCEDURE — 25000003 PHARM REV CODE 250: Performed by: INTERNAL MEDICINE

## 2023-11-30 PROCEDURE — 27000716 HC OXISENSOR PROBE, ANY SIZE

## 2023-11-30 PROCEDURE — 27000935

## 2023-11-30 PROCEDURE — 99900035 HC TECH TIME PER 15 MIN (STAT)

## 2023-11-30 PROCEDURE — 82962 GLUCOSE BLOOD TEST: CPT

## 2023-11-30 PROCEDURE — 94003 VENT MGMT INPAT SUBQ DAY: CPT

## 2023-11-30 PROCEDURE — 99900026 HC AIRWAY MAINTENANCE (STAT)

## 2023-11-30 PROCEDURE — 27000221 HC OXYGEN, UP TO 24 HOURS

## 2023-11-30 PROCEDURE — 94761 N-INVAS EAR/PLS OXIMETRY MLT: CPT

## 2023-11-30 PROCEDURE — 27000958

## 2023-11-30 PROCEDURE — 11000004 HC SNF PRIVATE

## 2023-11-30 PROCEDURE — 27200966 HC CLOSED SUCTION SYSTEM

## 2023-11-30 PROCEDURE — 25000242 PHARM REV CODE 250 ALT 637 W/ HCPCS: Performed by: INTERNAL MEDICINE

## 2023-11-30 RX ORDER — FUROSEMIDE 40 MG/1
40 TABLET ORAL ONCE
Status: COMPLETED | OUTPATIENT
Start: 2023-11-30 | End: 2023-11-30

## 2023-11-30 RX ADMIN — FUROSEMIDE 40 MG: 40 TABLET ORAL at 03:11

## 2023-11-30 RX ADMIN — IPRATROPIUM BROMIDE AND ALBUTEROL SULFATE 3 ML: 2.5; .5 SOLUTION RESPIRATORY (INHALATION) at 07:11

## 2023-11-30 RX ADMIN — POTASSIUM IODIDE 5 DROP: 1 SOLUTION ORAL at 01:11

## 2023-11-30 RX ADMIN — LEVETIRACETAM 500 MG: 500 SOLUTION ORAL at 08:11

## 2023-11-30 RX ADMIN — PANTOPRAZOLE SODIUM 40 MG: 40 GRANULE, DELAYED RELEASE ORAL at 08:11

## 2023-11-30 RX ADMIN — CEFUROXIME AXETIL 500 MG: 250 TABLET, FILM COATED ORAL at 08:11

## 2023-11-30 RX ADMIN — APIXABAN 5 MG: 2.5 TABLET, FILM COATED ORAL at 08:11

## 2023-11-30 RX ADMIN — POTASSIUM IODIDE 5 DROP: 1 SOLUTION ORAL at 06:11

## 2023-11-30 RX ADMIN — POTASSIUM IODIDE 5 DROP: 1 SOLUTION ORAL at 08:11

## 2023-11-30 NOTE — PLAN OF CARE
Problem: Adult Inpatient Plan of Care  Goal: Plan of Care Review  Outcome: Ongoing, Progressing     Problem: Adult Inpatient Plan of Care  Goal: Absence of Hospital-Acquired Illness or Injury  Outcome: Ongoing, Progressing     Problem: Adult Inpatient Plan of Care  Goal: Optimal Comfort and Wellbeing  Outcome: Ongoing, Progressing     Problem: Skin Injury Risk Increased  Goal: Skin Health and Integrity  Outcome: Ongoing, Progressing     Problem: Diabetes Comorbidity  Goal: Blood Glucose Level Within Targeted Range  Outcome: Ongoing, Progressing

## 2023-11-30 NOTE — PLAN OF CARE
Weekly Swing Bed Note    Patient is doing trach collar trials with  40 %  Fio2 for 16 hours today.  Will rest on AVAPS with  and 40% Fio2.  Sat 96% on trach collar.  BS coarse.

## 2023-11-30 NOTE — CARE UPDATE
3 Hours into trach collar trial pt began showing signs of increased WOB. Wheeze-like noises coming from upper airway. Spo2 87%, , RR 32. Pt was using accessory muscles. Placed pt back on AVAPS to rest. Dr. Flores notified and instructed to rest pt on AVAPS and restart trach collar trials Sunday December 3rd at 40% o2 2 hours TID. Pt stable at this time.

## 2023-11-30 NOTE — CARE UPDATE
Initiated SBT. Pts goal is 16 hours on 40% trach collar. Pt tolerated transition well. Pt is stable at this time.  Pts Spo2 and RR increased after SX.  Spo2 100%    RR 25

## 2023-11-30 NOTE — PROGRESS NOTES
Wt: 199# No problems noted with TF.  Pt continues to do TC trials.  Lab ok, BG Ok. Last BM 11/30.  Noted new order for SSKI. Continue NS.

## 2023-11-30 NOTE — PLAN OF CARE
[facility]  Discharge Reassessment    Primary Care Provider: Nati Doyle FNP    Expected Discharge Date:     Reassessment (most recent)       Discharge Reassessment - 11/30/23 1046          Discharge Reassessment    Did the patient's condition or plan change since previous assessment? No     Discharge Plan discussed with: Parent(s);Caregiver     Name(s) and Number(s) [place]     Communicated CRICKET with patient/caregiver Date not available/Unable to determine     Discharge Plan A Home with family;Home Health     DME Needed Upon Discharge  other (see comments)     Transition of Care Barriers None     Why the patient remains in the hospital Requires continued medical care        Post-Acute Status    Post-Acute Authorization Home Health     Coverage medicare and medicaid     Patient choice form signed by patient/caregiver List with quality metrics by geographic area provided     Discharge Delays None known at this time                   Cont to follow for dishcarge planing , updated Donna pts mother and discharge planis home with family and [facility] , will  determine DME closer to discharge

## 2023-11-30 NOTE — CARE UPDATE
[facility] - Medical Surgical Unit - Swing Bed   Interdisciplinary Team Meeting    Patient: Blue Abdul   Today's Date: 11/30/2023   Estimated D/C Date:         Physician: Ramiro Flores MD Nurse Practitioner:  nida   Pharmacy:  [facility] Unit Director: Holly Carrillo   : Anita Krause Physical/Occupational Therapy: Jadon Mark   Speech Therapy:  Monica Anderson Activity Therapy: nida   Nursing: Holly Carrillo  Respiratory: Tona Beckwith Dietary: Kuldeep Boggs  Other: na     Nurse  New Symptoms/Problems: increased secretions   Last Bowel Movement: 11/30/23   Urine: incontinent  Rojas: No  Bowel: incontinent   Constipated: No  Diarrhea: No   Isolation: Yes  Wound Care: No  Wound Location/Tx: na  Cognition: patient unresponsive  Aspiration Precautions: Yes  Comment(s): nida    Respiratory   O2 Device: Trach Collar  O2 Flow: 40%  SpO2: 95%  Neb Tx: Yes  Comment(s): 16 hours TC WITH AVAPS 40% AT HS     Dietary  Nutrition: NPO  Comment(s): Peptaman AF at 50 cc/hr with q 1 hours flush     Speech Therapy  Speech/Swallowing:  u unable at this time   Comment(s): nida    Physical Therapy  Gait/Assistive Device: unable  ELOS: Plan to DC     Transfers: Activity did not occur  Bed Mobility: Total Assistance Range of Motion/Restrictions: na  Comment(s): nida     Occupational Therapy  Eating/Grooming: Total Assistance Toileting: Total Assistance   Bathing: Total Assistance Dressing (Upper Body): Total Assistance   Dressing (Lower Body): Total Assistance Comment(s): nida     Pharmacy  Medication Changes (see all MD orders in chart): Yes  Labs Reviewed: Yes  New Lab Orders: No  Comment(s): SSKI  AND ABX 500MG Q 12 TREATING [facility]  Tx Plan/Recommendations reviewed with family and/or patient on (date) 11/28/23.  Additional family Conference/Training: NIDA  D/C Plan/Recommendations: Home with family  CRICKET:   Comment(s): NIDA

## 2023-12-01 LAB — GLUCOSE SERPL-MCNC: 153 MG/DL (ref 70–105)

## 2023-12-01 PROCEDURE — 25000242 PHARM REV CODE 250 ALT 637 W/ HCPCS: Performed by: INTERNAL MEDICINE

## 2023-12-01 PROCEDURE — 27000716 HC OXISENSOR PROBE, ANY SIZE

## 2023-12-01 PROCEDURE — 27000221 HC OXYGEN, UP TO 24 HOURS

## 2023-12-01 PROCEDURE — 99900026 HC AIRWAY MAINTENANCE (STAT)

## 2023-12-01 PROCEDURE — 27000958

## 2023-12-01 PROCEDURE — 99900035 HC TECH TIME PER 15 MIN (STAT)

## 2023-12-01 PROCEDURE — 63600175 PHARM REV CODE 636 W HCPCS: Performed by: INTERNAL MEDICINE

## 2023-12-01 PROCEDURE — 94761 N-INVAS EAR/PLS OXIMETRY MLT: CPT

## 2023-12-01 PROCEDURE — A6212 FOAM DRG <=16 SQ IN W/BORDER: HCPCS

## 2023-12-01 PROCEDURE — 25000003 PHARM REV CODE 250: Performed by: INTERNAL MEDICINE

## 2023-12-01 PROCEDURE — 25000003 PHARM REV CODE 250: Performed by: REGISTERED NURSE

## 2023-12-01 PROCEDURE — 94640 AIRWAY INHALATION TREATMENT: CPT

## 2023-12-01 PROCEDURE — 11000004 HC SNF PRIVATE

## 2023-12-01 PROCEDURE — 27000935

## 2023-12-01 PROCEDURE — 82962 GLUCOSE BLOOD TEST: CPT

## 2023-12-01 RX ADMIN — POTASSIUM IODIDE 5 DROP: 1 SOLUTION ORAL at 08:12

## 2023-12-01 RX ADMIN — PREDNISONE 30 MG: 20 TABLET ORAL at 09:12

## 2023-12-01 RX ADMIN — PANTOPRAZOLE SODIUM 40 MG: 40 GRANULE, DELAYED RELEASE ORAL at 09:12

## 2023-12-01 RX ADMIN — PANTOPRAZOLE SODIUM 40 MG: 40 GRANULE, DELAYED RELEASE ORAL at 08:12

## 2023-12-01 RX ADMIN — POTASSIUM IODIDE 5 DROP: 1 SOLUTION ORAL at 05:12

## 2023-12-01 RX ADMIN — IPRATROPIUM BROMIDE AND ALBUTEROL SULFATE 3 ML: 2.5; .5 SOLUTION RESPIRATORY (INHALATION) at 07:12

## 2023-12-01 RX ADMIN — APIXABAN 5 MG: 2.5 TABLET, FILM COATED ORAL at 08:12

## 2023-12-01 RX ADMIN — CEFUROXIME AXETIL 500 MG: 250 TABLET, FILM COATED ORAL at 09:12

## 2023-12-01 RX ADMIN — POTASSIUM IODIDE 5 DROP: 1 SOLUTION ORAL at 01:12

## 2023-12-01 RX ADMIN — LEVETIRACETAM 500 MG: 500 SOLUTION ORAL at 09:12

## 2023-12-01 RX ADMIN — APIXABAN 5 MG: 2.5 TABLET, FILM COATED ORAL at 09:12

## 2023-12-01 RX ADMIN — LEVETIRACETAM 500 MG: 500 SOLUTION ORAL at 08:12

## 2023-12-01 RX ADMIN — POTASSIUM IODIDE 5 DROP: 1 SOLUTION ORAL at 09:12

## 2023-12-01 NOTE — PLAN OF CARE
Problem: Adult Inpatient Plan of Care  Goal: Plan of Care Review  Outcome: Ongoing, Progressing     Problem: Adult Inpatient Plan of Care  Goal: Absence of Hospital-Acquired Illness or Injury  Outcome: Ongoing, Progressing     Problem: Adult Inpatient Plan of Care  Goal: Optimal Comfort and Wellbeing  Outcome: Ongoing, Progressing     Problem: Inability to Wean (Mechanical Ventilation, Invasive)  Goal: Mechanical Ventilation Liberation  Outcome: Ongoing, Progressing

## 2023-12-01 NOTE — PLAN OF CARE
Problem: Adult Inpatient Plan of Care  Goal: Plan of Care Review  Outcome: Ongoing, Progressing     Problem: Impaired Wound Healing  Goal: Optimal Wound Healing  Outcome: Ongoing, Progressing     Problem: Infection  Goal: Absence of Infection Signs and Symptoms  Outcome: Met     Problem: Bariatric Environmental Safety  Goal: Safety Maintained with Care  Outcome: Ongoing, Progressing

## 2023-12-02 LAB — GLUCOSE SERPL-MCNC: 180 MG/DL (ref 70–105)

## 2023-12-02 PROCEDURE — 25000003 PHARM REV CODE 250: Performed by: INTERNAL MEDICINE

## 2023-12-02 PROCEDURE — 94761 N-INVAS EAR/PLS OXIMETRY MLT: CPT

## 2023-12-02 PROCEDURE — 25000003 PHARM REV CODE 250: Performed by: REGISTERED NURSE

## 2023-12-02 PROCEDURE — 25000242 PHARM REV CODE 250 ALT 637 W/ HCPCS: Performed by: INTERNAL MEDICINE

## 2023-12-02 PROCEDURE — 11000004 HC SNF PRIVATE

## 2023-12-02 PROCEDURE — 94640 AIRWAY INHALATION TREATMENT: CPT

## 2023-12-02 PROCEDURE — 27000941

## 2023-12-02 PROCEDURE — 99900035 HC TECH TIME PER 15 MIN (STAT)

## 2023-12-02 PROCEDURE — 27000935

## 2023-12-02 PROCEDURE — 82962 GLUCOSE BLOOD TEST: CPT

## 2023-12-02 PROCEDURE — 63600175 PHARM REV CODE 636 W HCPCS: Performed by: INTERNAL MEDICINE

## 2023-12-02 PROCEDURE — 27200966 HC CLOSED SUCTION SYSTEM

## 2023-12-02 PROCEDURE — 99900026 HC AIRWAY MAINTENANCE (STAT)

## 2023-12-02 PROCEDURE — 27000221 HC OXYGEN, UP TO 24 HOURS

## 2023-12-02 PROCEDURE — 94003 VENT MGMT INPAT SUBQ DAY: CPT

## 2023-12-02 RX ADMIN — POTASSIUM IODIDE 5 DROP: 1 SOLUTION ORAL at 05:12

## 2023-12-02 RX ADMIN — LEVETIRACETAM 500 MG: 500 SOLUTION ORAL at 09:12

## 2023-12-02 RX ADMIN — IPRATROPIUM BROMIDE AND ALBUTEROL SULFATE 3 ML: 2.5; .5 SOLUTION RESPIRATORY (INHALATION) at 07:12

## 2023-12-02 RX ADMIN — POTASSIUM IODIDE 5 DROP: 1 SOLUTION ORAL at 09:12

## 2023-12-02 RX ADMIN — POTASSIUM IODIDE 5 DROP: 1 SOLUTION ORAL at 01:12

## 2023-12-02 RX ADMIN — PANTOPRAZOLE SODIUM 40 MG: 40 GRANULE, DELAYED RELEASE ORAL at 08:12

## 2023-12-02 RX ADMIN — APIXABAN 5 MG: 2.5 TABLET, FILM COATED ORAL at 09:12

## 2023-12-02 RX ADMIN — PANTOPRAZOLE SODIUM 40 MG: 40 GRANULE, DELAYED RELEASE ORAL at 09:12

## 2023-12-02 RX ADMIN — APIXABAN 5 MG: 2.5 TABLET, FILM COATED ORAL at 08:12

## 2023-12-02 RX ADMIN — LEVETIRACETAM 500 MG: 500 SOLUTION ORAL at 08:12

## 2023-12-02 RX ADMIN — POTASSIUM IODIDE 5 DROP: 1 SOLUTION ORAL at 08:12

## 2023-12-02 RX ADMIN — PREDNISONE 30 MG: 20 TABLET ORAL at 09:12

## 2023-12-02 NOTE — CARE UPDATE
0718    Placed pt on TC @ 40% X 2hrs    Nurse notified    Will continue to monitor pt    %          0918    Pt completed TC X 2hrs    Nurse notified     Placed back on avaps    %

## 2023-12-02 NOTE — PLAN OF CARE
Problem: Communication Impairment (Mechanical Ventilation, Invasive)  Goal: Effective Communication  Outcome: Ongoing, Progressing     Problem: Inability to Wean (Mechanical Ventilation, Invasive)  Goal: Mechanical Ventilation Liberation  Outcome: Ongoing, Progressing     Problem: Communication Impairment (Artificial Airway)  Goal: Effective Communication  Outcome: Ongoing, Progressing     Problem: Device-Related Complication Risk (Artificial Airway)  Goal: Optimal Device Function  Outcome: Ongoing, Progressing

## 2023-12-02 NOTE — PLAN OF CARE
Problem: Adult Inpatient Plan of Care  Goal: Plan of Care Review  Outcome: Ongoing, Progressing  Goal: Patient-Specific Goal (Individualized)  Outcome: Ongoing, Progressing  Goal: Absence of Hospital-Acquired Illness or Injury  Outcome: Ongoing, Progressing  Goal: Optimal Comfort and Wellbeing  Outcome: Ongoing, Progressing     Problem: Device-Related Complication Risk (Mechanical Ventilation, Invasive)  Goal: Optimal Device Function  Outcome: Ongoing, Progressing     Problem: Inability to Wean (Mechanical Ventilation, Invasive)  Goal: Mechanical Ventilation Liberation  Outcome: Ongoing, Progressing     Problem: Nutrition Impairment (Mechanical Ventilation, Invasive)  Goal: Optimal Nutrition Delivery  Outcome: Ongoing, Progressing     Problem: Skin and Tissue Injury (Mechanical Ventilation, Invasive)  Goal: Absence of Device-Related Skin and Tissue Injury  Outcome: Ongoing, Progressing     Problem: Skin Injury Risk Increased  Goal: Skin Health and Integrity  Outcome: Ongoing, Progressing     Problem: Impaired Wound Healing  Goal: Optimal Wound Healing  Outcome: Ongoing, Progressing     Problem: Diabetes Comorbidity  Goal: Blood Glucose Level Within Targeted Range  Outcome: Ongoing, Progressing     Problem: Communication Impairment (Mechanical Ventilation, Invasive)  Goal: Effective Communication  Outcome: Ongoing, Not Progressing

## 2023-12-02 NOTE — CARE UPDATE
1300    Placed pt on TC @ 40% X 2hrs    Nurse notified    Will continue to monitor pt    %    HR-87    RR-18        1500    Pt completed TC X 2 hrs    Nurse notified    Placed pt back on avaps    %

## 2023-12-03 LAB — GLUCOSE SERPL-MCNC: 233 MG/DL (ref 70–105)

## 2023-12-03 PROCEDURE — 27000221 HC OXYGEN, UP TO 24 HOURS

## 2023-12-03 PROCEDURE — 25000242 PHARM REV CODE 250 ALT 637 W/ HCPCS: Performed by: INTERNAL MEDICINE

## 2023-12-03 PROCEDURE — 25000003 PHARM REV CODE 250: Performed by: REGISTERED NURSE

## 2023-12-03 PROCEDURE — 63600175 PHARM REV CODE 636 W HCPCS: Performed by: INTERNAL MEDICINE

## 2023-12-03 PROCEDURE — 11000004 HC SNF PRIVATE

## 2023-12-03 PROCEDURE — 27200966 HC CLOSED SUCTION SYSTEM

## 2023-12-03 PROCEDURE — 94761 N-INVAS EAR/PLS OXIMETRY MLT: CPT

## 2023-12-03 PROCEDURE — 94003 VENT MGMT INPAT SUBQ DAY: CPT

## 2023-12-03 PROCEDURE — 99900035 HC TECH TIME PER 15 MIN (STAT)

## 2023-12-03 PROCEDURE — 27000935

## 2023-12-03 PROCEDURE — 82962 GLUCOSE BLOOD TEST: CPT

## 2023-12-03 PROCEDURE — 99900026 HC AIRWAY MAINTENANCE (STAT)

## 2023-12-03 PROCEDURE — 94640 AIRWAY INHALATION TREATMENT: CPT

## 2023-12-03 PROCEDURE — 25000003 PHARM REV CODE 250: Performed by: INTERNAL MEDICINE

## 2023-12-03 PROCEDURE — 27000941

## 2023-12-03 RX ADMIN — APIXABAN 5 MG: 2.5 TABLET, FILM COATED ORAL at 09:12

## 2023-12-03 RX ADMIN — POTASSIUM IODIDE 5 DROP: 1 SOLUTION ORAL at 08:12

## 2023-12-03 RX ADMIN — PANTOPRAZOLE SODIUM 40 MG: 40 GRANULE, DELAYED RELEASE ORAL at 09:12

## 2023-12-03 RX ADMIN — LEVETIRACETAM 500 MG: 500 SOLUTION ORAL at 09:12

## 2023-12-03 RX ADMIN — LEVETIRACETAM 500 MG: 500 SOLUTION ORAL at 08:12

## 2023-12-03 RX ADMIN — POTASSIUM IODIDE 5 DROP: 1 SOLUTION ORAL at 01:12

## 2023-12-03 RX ADMIN — POTASSIUM IODIDE 5 DROP: 1 SOLUTION ORAL at 09:12

## 2023-12-03 RX ADMIN — APIXABAN 5 MG: 2.5 TABLET, FILM COATED ORAL at 08:12

## 2023-12-03 RX ADMIN — PANTOPRAZOLE SODIUM 40 MG: 40 GRANULE, DELAYED RELEASE ORAL at 08:12

## 2023-12-03 RX ADMIN — PREDNISONE 30 MG: 20 TABLET ORAL at 09:12

## 2023-12-03 RX ADMIN — IPRATROPIUM BROMIDE AND ALBUTEROL SULFATE 3 ML: 2.5; .5 SOLUTION RESPIRATORY (INHALATION) at 07:12

## 2023-12-03 RX ADMIN — POTASSIUM IODIDE 5 DROP: 1 SOLUTION ORAL at 05:12

## 2023-12-03 NOTE — PLAN OF CARE
Problem: Adult Inpatient Plan of Care  Goal: Plan of Care Review  Outcome: Ongoing, Progressing  Goal: Absence of Hospital-Acquired Illness or Injury  Outcome: Ongoing, Progressing     Problem: Inability to Wean (Mechanical Ventilation, Invasive)  Goal: Mechanical Ventilation Liberation  Outcome: Ongoing, Progressing

## 2023-12-03 NOTE — CARE UPDATE
0730    Placed pt on TC @ 40%  X 3hrs    Nurse notified    Will continue to monitor pt    %          1030    Pt completed TC X 3hrs    Nurse notified    Placed back on avaps    %

## 2023-12-03 NOTE — PLAN OF CARE
Problem: Inability to Wean (Mechanical Ventilation, Invasive)  Goal: Mechanical Ventilation Liberation  Outcome: Ongoing, Progressing     Problem: Nutrition Impairment (Mechanical Ventilation, Invasive)  Goal: Optimal Nutrition Delivery  Outcome: Ongoing, Progressing     Problem: Skin and Tissue Injury (Mechanical Ventilation, Invasive)  Goal: Absence of Device-Related Skin and Tissue Injury  Outcome: Ongoing, Progressing     Problem: Ventilator-Induced Lung Injury (Mechanical Ventilation, Invasive)  Goal: Absence of Ventilator-Induced Lung Injury  Outcome: Ongoing, Progressing     Problem: Communication Impairment (Artificial Airway)  Goal: Effective Communication  Outcome: Ongoing, Progressing     Problem: Device-Related Complication Risk (Artificial Airway)  Goal: Optimal Device Function  Outcome: Ongoing, Progressing     Problem: Skin and Tissue Injury (Artificial Airway)  Goal: Absence of Device-Related Skin or Tissue Injury  Outcome: Ongoing, Progressing     Problem: Noninvasive Ventilation Acute  Goal: Effective Unassisted Ventilation and Oxygenation  Outcome: Ongoing, Progressing

## 2023-12-03 NOTE — CARE UPDATE
1310    Placed pt on TC @ 40% X 3hrs    Nurse notified    Will continue to monitor pt    %          1610    Pt completed TC X 3hrs    Nurse notified    Pt placed back on avaps    %

## 2023-12-03 NOTE — PLAN OF CARE
Problem: Communication Impairment (Mechanical Ventilation, Invasive)  Goal: Effective Communication  Outcome: Ongoing, Progressing     Problem: Device-Related Complication Risk (Mechanical Ventilation, Invasive)  Goal: Optimal Device Function  Outcome: Ongoing, Progressing     Problem: Inability to Wean (Mechanical Ventilation, Invasive)  Goal: Mechanical Ventilation Liberation  Outcome: Ongoing, Progressing     Problem: Communication Impairment (Artificial Airway)  Goal: Effective Communication  Outcome: Ongoing, Progressing

## 2023-12-04 LAB — GLUCOSE SERPL-MCNC: 171 MG/DL (ref 70–105)

## 2023-12-04 PROCEDURE — 63600175 PHARM REV CODE 636 W HCPCS: Performed by: INTERNAL MEDICINE

## 2023-12-04 PROCEDURE — 25000003 PHARM REV CODE 250: Performed by: REGISTERED NURSE

## 2023-12-04 PROCEDURE — 94640 AIRWAY INHALATION TREATMENT: CPT

## 2023-12-04 PROCEDURE — 94003 VENT MGMT INPAT SUBQ DAY: CPT

## 2023-12-04 PROCEDURE — 27000941

## 2023-12-04 PROCEDURE — 11000004 HC SNF PRIVATE

## 2023-12-04 PROCEDURE — 25000003 PHARM REV CODE 250: Performed by: INTERNAL MEDICINE

## 2023-12-04 PROCEDURE — 27000935

## 2023-12-04 PROCEDURE — 27000221 HC OXYGEN, UP TO 24 HOURS

## 2023-12-04 PROCEDURE — 25000242 PHARM REV CODE 250 ALT 637 W/ HCPCS: Performed by: INTERNAL MEDICINE

## 2023-12-04 PROCEDURE — 94761 N-INVAS EAR/PLS OXIMETRY MLT: CPT

## 2023-12-04 PROCEDURE — 27200966 HC CLOSED SUCTION SYSTEM

## 2023-12-04 PROCEDURE — 82962 GLUCOSE BLOOD TEST: CPT

## 2023-12-04 PROCEDURE — 99900026 HC AIRWAY MAINTENANCE (STAT)

## 2023-12-04 PROCEDURE — 99900035 HC TECH TIME PER 15 MIN (STAT)

## 2023-12-04 RX ADMIN — POTASSIUM IODIDE 5 DROP: 1 SOLUTION ORAL at 08:12

## 2023-12-04 RX ADMIN — LEVETIRACETAM 500 MG: 500 SOLUTION ORAL at 08:12

## 2023-12-04 RX ADMIN — IPRATROPIUM BROMIDE AND ALBUTEROL SULFATE 3 ML: 2.5; .5 SOLUTION RESPIRATORY (INHALATION) at 07:12

## 2023-12-04 RX ADMIN — PANTOPRAZOLE SODIUM 40 MG: 40 GRANULE, DELAYED RELEASE ORAL at 08:12

## 2023-12-04 RX ADMIN — POTASSIUM IODIDE 5 DROP: 1 SOLUTION ORAL at 01:12

## 2023-12-04 RX ADMIN — PREDNISONE 30 MG: 20 TABLET ORAL at 08:12

## 2023-12-04 RX ADMIN — POTASSIUM IODIDE 5 DROP: 1 SOLUTION ORAL at 05:12

## 2023-12-04 RX ADMIN — APIXABAN 5 MG: 2.5 TABLET, FILM COATED ORAL at 08:12

## 2023-12-04 NOTE — CARE UPDATE
Pt did well with 4 hour trach collar trial. Placed pt back on AVAPS to rest. Pt tolerated transition well.   Spo2 100%  HR 99  RR 20

## 2023-12-04 NOTE — CARE UPDATE
Placed pt on SBT. Pts goal today is 4 hours on trach collar at 40% o2 TID. Pt tolerated transition well with no complications.     Spo2 100%    RR 22

## 2023-12-04 NOTE — CARE UPDATE
Placed pt on trach collar 40%. Pts goal is 4 hours and then will rest on avaps again. Pt tolerated transition well without any complications.     SPO2 100%  HR 99  RR 22

## 2023-12-04 NOTE — PLAN OF CARE
Care plan reviewed  Problem: Adult Inpatient Plan of Care  Goal: Plan of Care Review  Outcome: Ongoing, Progressing     Problem: Adult Inpatient Plan of Care  Goal: Patient-Specific Goal (Individualized)  Outcome: Ongoing, Progressing     Problem: Adult Inpatient Plan of Care  Goal: Absence of Hospital-Acquired Illness or Injury  Outcome: Ongoing, Progressing     Problem: Adult Inpatient Plan of Care  Goal: Optimal Comfort and Wellbeing  Outcome: Ongoing, Progressing     Problem: Adult Inpatient Plan of Care  Goal: Readiness for Transition of Care  Outcome: Ongoing, Progressing

## 2023-12-05 LAB
ANION GAP SERPL CALCULATED.3IONS-SCNC: 13 MMOL/L (ref 7–16)
ANISOCYTOSIS BLD QL SMEAR: ABNORMAL
BASOPHILS # BLD AUTO: 0.06 K/UL (ref 0–0.2)
BASOPHILS NFR BLD AUTO: 0.7 % (ref 0–1)
BUN SERPL-MCNC: 12 MG/DL (ref 7–18)
BUN/CREAT SERPL: 21 (ref 6–20)
CALCIUM SERPL-MCNC: 9.6 MG/DL (ref 8.5–10.1)
CHLORIDE SERPL-SCNC: 102 MMOL/L (ref 98–107)
CO2 SERPL-SCNC: 28 MMOL/L (ref 21–32)
CREAT SERPL-MCNC: 0.56 MG/DL (ref 0.7–1.3)
DIFFERENTIAL METHOD BLD: ABNORMAL
EGFR (NO RACE VARIABLE) (RUSH/TITUS): 135 ML/MIN/1.73M2
EOSINOPHIL # BLD AUTO: 0.06 K/UL (ref 0–0.5)
EOSINOPHIL NFR BLD AUTO: 0.7 % (ref 1–4)
ERYTHROCYTE [DISTWIDTH] IN BLOOD BY AUTOMATED COUNT: 18.6 % (ref 11.5–14.5)
GLUCOSE SERPL-MCNC: 127 MG/DL (ref 74–106)
HCT VFR BLD AUTO: 32.5 % (ref 40–54)
HGB BLD-MCNC: 9.8 G/DL (ref 13.5–18)
LYMPHOCYTES # BLD AUTO: 2.19 K/UL (ref 1–4.8)
LYMPHOCYTES NFR BLD AUTO: 23.8 % (ref 27–41)
LYMPHOCYTES NFR BLD MANUAL: 28 % (ref 27–41)
MCH RBC QN AUTO: 28.3 PG (ref 27–31)
MCHC RBC AUTO-ENTMCNC: 30.2 G/DL (ref 32–36)
MCV RBC AUTO: 93.9 FL (ref 80–96)
MONOCYTES # BLD AUTO: 0.88 K/UL (ref 0–0.8)
MONOCYTES NFR BLD AUTO: 9.5 % (ref 2–6)
MONOCYTES NFR BLD MANUAL: 6 % (ref 2–6)
MPC BLD CALC-MCNC: 10.5 FL (ref 9.4–12.4)
NEUTROPHILS # BLD AUTO: 6.03 K/UL (ref 1.8–7.7)
NEUTROPHILS NFR BLD AUTO: 65.3 % (ref 53–65)
NEUTS BAND NFR BLD MANUAL: 1 % (ref 1–5)
NEUTS SEG NFR BLD MANUAL: 65 % (ref 50–62)
NRBC BLD MANUAL-RTO: ABNORMAL %
PLATELET # BLD AUTO: 554 K/UL (ref 150–400)
POIKILOCYTOSIS BLD QL SMEAR: ABNORMAL
POTASSIUM SERPL-SCNC: 3.9 MMOL/L (ref 3.5–5.1)
RBC # BLD AUTO: 3.46 M/UL (ref 4.6–6.2)
SODIUM SERPL-SCNC: 139 MMOL/L (ref 136–145)
WBC # BLD AUTO: 9.22 K/UL (ref 4.5–11)

## 2023-12-05 PROCEDURE — 99900026 HC AIRWAY MAINTENANCE (STAT)

## 2023-12-05 PROCEDURE — 99900031 HC PATIENT EDUCATION (STAT)

## 2023-12-05 PROCEDURE — 27000935

## 2023-12-05 PROCEDURE — 11000004 HC SNF PRIVATE

## 2023-12-05 PROCEDURE — 25000242 PHARM REV CODE 250 ALT 637 W/ HCPCS: Performed by: INTERNAL MEDICINE

## 2023-12-05 PROCEDURE — 99309 SBSQ NF CARE MODERATE MDM 30: CPT | Mod: ,,, | Performed by: INTERNAL MEDICINE

## 2023-12-05 PROCEDURE — 0BJ08ZZ INSPECTION OF TRACHEOBRONCHIAL TREE, VIA NATURAL OR ARTIFICIAL OPENING ENDOSCOPIC: ICD-10-PCS | Performed by: INTERNAL MEDICINE

## 2023-12-05 PROCEDURE — 27000221 HC OXYGEN, UP TO 24 HOURS

## 2023-12-05 PROCEDURE — 94640 AIRWAY INHALATION TREATMENT: CPT

## 2023-12-05 PROCEDURE — 0B21XFZ CHANGE TRACHEOSTOMY DEVICE IN TRACHEA, EXTERNAL APPROACH: ICD-10-PCS | Performed by: INTERNAL MEDICINE

## 2023-12-05 PROCEDURE — 31622 DX BRONCHOSCOPE/WASH: CPT

## 2023-12-05 PROCEDURE — 80048 BASIC METABOLIC PNL TOTAL CA: CPT | Performed by: INTERNAL MEDICINE

## 2023-12-05 PROCEDURE — 85025 COMPLETE CBC W/AUTO DIFF WBC: CPT | Performed by: INTERNAL MEDICINE

## 2023-12-05 PROCEDURE — 25000003 PHARM REV CODE 250: Performed by: INTERNAL MEDICINE

## 2023-12-05 PROCEDURE — 94761 N-INVAS EAR/PLS OXIMETRY MLT: CPT

## 2023-12-05 PROCEDURE — 27200966 HC CLOSED SUCTION SYSTEM

## 2023-12-05 PROCEDURE — 27000958

## 2023-12-05 PROCEDURE — 99900025 HC BRONCHOSCOPY-ASST (STAT)

## 2023-12-05 PROCEDURE — 99900035 HC TECH TIME PER 15 MIN (STAT)

## 2023-12-05 PROCEDURE — 25000003 PHARM REV CODE 250: Performed by: REGISTERED NURSE

## 2023-12-05 PROCEDURE — 94003 VENT MGMT INPAT SUBQ DAY: CPT

## 2023-12-05 PROCEDURE — 63600175 PHARM REV CODE 636 W HCPCS: Performed by: INTERNAL MEDICINE

## 2023-12-05 RX ORDER — BUDESONIDE 0.5 MG/2ML
0.5 INHALANT ORAL EVERY 12 HOURS
Status: DISCONTINUED | OUTPATIENT
Start: 2023-12-05 | End: 2024-01-31 | Stop reason: HOSPADM

## 2023-12-05 RX ORDER — PREDNISONE 20 MG/1
20 TABLET ORAL DAILY
Status: DISCONTINUED | OUTPATIENT
Start: 2023-12-06 | End: 2024-01-02

## 2023-12-05 RX ADMIN — PREDNISONE 30 MG: 20 TABLET ORAL at 08:12

## 2023-12-05 RX ADMIN — PANTOPRAZOLE SODIUM 40 MG: 40 GRANULE, DELAYED RELEASE ORAL at 09:12

## 2023-12-05 RX ADMIN — BUDESONIDE 0.5 MG: 0.5 INHALANT ORAL at 07:12

## 2023-12-05 RX ADMIN — PANTOPRAZOLE SODIUM 40 MG: 40 GRANULE, DELAYED RELEASE ORAL at 08:12

## 2023-12-05 RX ADMIN — LEVETIRACETAM 500 MG: 500 SOLUTION ORAL at 09:12

## 2023-12-05 RX ADMIN — POTASSIUM IODIDE 5 DROP: 1 SOLUTION ORAL at 04:12

## 2023-12-05 RX ADMIN — LEVETIRACETAM 500 MG: 500 SOLUTION ORAL at 08:12

## 2023-12-05 RX ADMIN — POTASSIUM IODIDE 5 DROP: 1 SOLUTION ORAL at 08:12

## 2023-12-05 RX ADMIN — APIXABAN 5 MG: 2.5 TABLET, FILM COATED ORAL at 09:12

## 2023-12-05 RX ADMIN — IPRATROPIUM BROMIDE AND ALBUTEROL SULFATE 3 ML: 2.5; .5 SOLUTION RESPIRATORY (INHALATION) at 07:12

## 2023-12-05 RX ADMIN — APIXABAN 5 MG: 2.5 TABLET, FILM COATED ORAL at 08:12

## 2023-12-05 RX ADMIN — POTASSIUM IODIDE 5 DROP: 1 SOLUTION ORAL at 09:12

## 2023-12-05 RX ADMIN — POTASSIUM IODIDE 5 DROP: 1 SOLUTION ORAL at 01:12

## 2023-12-05 NOTE — PROCEDURES
"Blue Abdul is a 33 y.o. male patient.    Temp: 98.8 °F (37.1 °C) (12/05/23 0705)  Pulse: 104 (12/05/23 0707)  Resp: 20 (12/05/23 0707)  BP: (!) 106/46 (12/05/23 0705)  SpO2: 100 % (12/05/23 0707)  Weight: 90.7 kg (199 lb 15.3 oz) (12/05/23 0318)  Height: 4' 11" (149.9 cm) (11/14/23 1103)       Procedures  Bronchoscopy bronchoscopy was initiated to tracheostomy he has diffuse bronchitis tracheobronchial tree without abnormalities except right below the trach her some granulomatous tissue were going to try to change his trach out but was unable to fit into his trach replaced his old tracheostomy going to resting for awhile and then start weaning trials    12/5/2023    "

## 2023-12-05 NOTE — SUBJECTIVE & OBJECTIVE
Interval History:  Patient without complaints    Review of Systems  Objective:     Vital Signs (Most Recent):  Temp: 98.8 °F (37.1 °C) (12/05/23 0705)  Pulse: 104 (12/05/23 0707)  Resp: 20 (12/05/23 0707)  BP: (!) 106/46 (12/05/23 0705)  SpO2: 100 % (12/05/23 0707) Vital Signs (24h Range):  Temp:  [98.8 °F (37.1 °C)-99.8 °F (37.7 °C)] 98.8 °F (37.1 °C)  Pulse:  [] 104  Resp:  [18-40] 20  SpO2:  [100 %] 100 %  BP: ()/(43-73) 106/46     Weight: 90.7 kg (199 lb 15.3 oz)  Body mass index is 40.39 kg/m².    Intake/Output Summary (Last 24 hours) at 12/5/2023 1032  Last data filed at 12/5/2023 0500  Gross per 24 hour   Intake 2110 ml   Output --   Net 2110 ml         Physical Exam  Vitals reviewed.   Constitutional:       Appearance: Normal appearance.      Interventions: He is not intubated.     Comments: Tracheostomy and feeding in place   HENT:      Head: Normocephalic and atraumatic.      Nose: Nose normal.      Mouth/Throat:      Mouth: Mucous membranes are dry.      Pharynx: Oropharynx is clear.   Eyes:      Extraocular Movements: Extraocular movements intact.      Conjunctiva/sclera: Conjunctivae normal.      Pupils: Pupils are equal, round, and reactive to light.   Cardiovascular:      Rate and Rhythm: Normal rate.      Heart sounds: Normal heart sounds. No murmur heard.  Pulmonary:      Effort: Pulmonary effort is normal. He is not intubated.      Breath sounds: Normal breath sounds.   Abdominal:      General: Abdomen is flat. Bowel sounds are normal.      Palpations: Abdomen is soft.   Musculoskeletal:         General: Normal range of motion.      Cervical back: Normal range of motion and neck supple.      Right lower leg: No edema.      Left lower leg: No edema.   Skin:     General: Skin is warm and dry.      Capillary Refill: Capillary refill takes less than 2 seconds.   Neurological:      General: No focal deficit present.      Mental Status: He is alert and oriented to person, place, and time.    Psychiatric:         Mood and Affect: Mood normal.         Behavior: Behavior normal.             Significant Labs: All pertinent labs within the past 24 hours have been reviewed.  Recent Lab Results         12/05/23  0435        Anion Gap 13       Aniso 1+  Comment: This is an appended report.  These results have been appended to a previously final verified report.       Bands 1       Baso # 0.06       Basophil % 0.7       BUN 12       BUN/CREAT RATIO 21       Calcium 9.6       Chloride 102       CO2 28       Creatinine 0.56       Differential Method Manual       eGFR 135       Eos # 0.06       Eosinophil % 0.7       Glucose 127       Hematocrit 32.5       Hemoglobin 9.8       Lymph # 2.19       Lymph % 23.8        28       MCH 28.3       MCHC 30.2       MCV 93.9       Mono # 0.88       Mono % 9.5        6       MPV 10.5       Neutrophils, Abs 6.03       Neutrophils Relative 65.3       nRBC       Platelet Count 554       Poikilocytosis 1+  Comment: This is an appended report.  These results have been appended to a previously final verified report.       Potassium 3.9       RBC 3.46       RDW 18.6       Segmented Neutrophils, Man % 65       Sodium 139       WBC 9.22               Significant Imaging: I have reviewed all pertinent imaging results/findings within the past 24 hours.

## 2023-12-05 NOTE — ASSESSMENT & PLAN NOTE
Seems to be a little bit better still some confusion some consideration he has hypoxic encephalopathy

## 2023-12-05 NOTE — PROGRESS NOTES
[facility] - Medical Surgical Unit  Hospital Medicine  Progress Note    Patient Name: Blue Abdul  MRN: [id number]  Patient Class: IP- Swing   Admission Date: 11/9/2023  Length of Stay: 26 days  Attending Physician: Ramiro Flores MD  Primary Care Provider: Nati Doyle FNP        Subjective:     Principal Problem:Acute hypercapnic respiratory failure        HPI:  33 y-old AAM with PMH of Trisomy 21, asthma, DM, GERD, and LILLIE. Patient presented to an outside hospital system on 09/08/2023 for behavioral issues r/t medication changes. While at the hospital he experienced a hypoglycemic episode with aspiration and was coded. Patient developed new onset seizure at that time with MRI showing cerebral edema. Patient experienced difficulty with vent weaning hattie trach was placed on 09/27/2023, PEG tube placed on 10/05/2023. Patient is admitted to [facility] for continued vent weaning, PT/OT eval and treatment.     Overview/Hospital Course:  No notes on file    Interval History:  Patient without complaints    Review of Systems  Objective:     Vital Signs (Most Recent):  Temp: 98.8 °F (37.1 °C) (12/05/23 0705)  Pulse: 104 (12/05/23 0707)  Resp: 20 (12/05/23 0707)  BP: (!) 106/46 (12/05/23 0705)  SpO2: 100 % (12/05/23 0707) Vital Signs (24h Range):  Temp:  [98.8 °F (37.1 °C)-99.8 °F (37.7 °C)] 98.8 °F (37.1 °C)  Pulse:  [] 104  Resp:  [18-40] 20  SpO2:  [100 %] 100 %  BP: ()/(43-73) 106/46     Weight: 90.7 kg (199 lb 15.3 oz)  Body mass index is 40.39 kg/m².    Intake/Output Summary (Last 24 hours) at 12/5/2023 1032  Last data filed at 12/5/2023 0500  Gross per 24 hour   Intake 2110 ml   Output --   Net 2110 ml         Physical Exam  Vitals reviewed.   Constitutional:       Appearance: Normal appearance.      Interventions: He is not intubated.     Comments: Tracheostomy and feeding in place   HENT:      Head: Normocephalic and atraumatic.      Nose: Nose normal.       Mouth/Throat:      Mouth: Mucous membranes are dry.      Pharynx: Oropharynx is clear.   Eyes:      Extraocular Movements: Extraocular movements intact.      Conjunctiva/sclera: Conjunctivae normal.      Pupils: Pupils are equal, round, and reactive to light.   Cardiovascular:      Rate and Rhythm: Normal rate.      Heart sounds: Normal heart sounds. No murmur heard.  Pulmonary:      Effort: Pulmonary effort is normal. He is not intubated.      Breath sounds: Normal breath sounds.   Abdominal:      General: Abdomen is flat. Bowel sounds are normal.      Palpations: Abdomen is soft.   Musculoskeletal:         General: Normal range of motion.      Cervical back: Normal range of motion and neck supple.      Right lower leg: No edema.      Left lower leg: No edema.   Skin:     General: Skin is warm and dry.      Capillary Refill: Capillary refill takes less than 2 seconds.   Neurological:      General: No focal deficit present.      Mental Status: He is alert and oriented to person, place, and time.   Psychiatric:         Mood and Affect: Mood normal.         Behavior: Behavior normal.             Significant Labs: All pertinent labs within the past 24 hours have been reviewed.  Recent Lab Results         12/05/23  0435        Anion Gap 13       Aniso 1+  Comment: This is an appended report.  These results have been appended to a previously final verified report.       Bands 1       Baso # 0.06       Basophil % 0.7       BUN 12       BUN/CREAT RATIO 21       Calcium 9.6       Chloride 102       CO2 28       Creatinine 0.56       Differential Method Manual       eGFR 135       Eos # 0.06       Eosinophil % 0.7       Glucose 127       Hematocrit 32.5       Hemoglobin 9.8       Lymph # 2.19       Lymph % 23.8        28       MCH 28.3       MCHC 30.2       MCV 93.9       Mono # 0.88       Mono % 9.5        6       MPV 10.5       Neutrophils, Abs 6.03       Neutrophils Relative 65.3       nRBC       Platelet Count 554        Poikilocytosis 1+  Comment: This is an appended report.  These results have been appended to a previously final verified report.       Potassium 3.9       RBC 3.46       RDW 18.6       Segmented Neutrophils, Man % 65       Sodium 139       WBC 9.22               Significant Imaging: I have reviewed all pertinent imaging results/findings within the past 24 hours.    Assessment/Plan:      * Acute hypercapnic respiratory failure  Increase weaning trials there are some granulomatous tissue below the trach we tried to change it to a SLT the smaller size would not fit into the whole we replaced his old tracheostomy continue weaning trials    Encephalopathy, metabolic  Seems to be a little bit better still some confusion some consideration he has hypoxic encephalopathy      Muscle weakness  Start PT and OT      Seizures  MRI showed encephalopathy  Keppra  Ativan PRN      Diabetes mellitus  Currently good control with sliding scale      VTE Risk Mitigation (From admission, onward)           Ordered     apixaban tablet 5 mg  2 times daily         11/09/23 2008     IP VTE HIGH RISK PATIENT  Once         11/09/23 1641     Place NORMA hose  Until discontinued         11/09/23 1641     Place sequential compression device  Until discontinued         11/09/23 1641                    Discharge Planning   CRICKET:      Code Status: Full Code   Is the patient medically ready for discharge?:     Reason for patient still in hospital (select all that apply): Patient new problem, Patient trending condition, Laboratory test, and Treatment  Discharge Plan A: Home with family, Home Health   Discharge Delays: None known at this time              Ramiro Flores MD  Department of Hospital Medicine   [facility] - Medical Surgical Unit

## 2023-12-05 NOTE — ASSESSMENT & PLAN NOTE
Increase weaning trials there are some granulomatous tissue below the trach we tried to change it to a SLT the smaller size would not fit into the whole we replaced his old tracheostomy continue weaning trials

## 2023-12-05 NOTE — CARE UPDATE
Dr. Flores tried to changed the trach out and due to we did not have the same kind of trach to put back in Dr. Flores tried to put in a 7.0 shiley trach in and due to swelling of the trachea he was unable to get the trach in, so he stuck the old trach back in.

## 2023-12-06 LAB — GLUCOSE SERPL-MCNC: 212 MG/DL (ref 70–105)

## 2023-12-06 PROCEDURE — 94640 AIRWAY INHALATION TREATMENT: CPT

## 2023-12-06 PROCEDURE — A6250 SKIN SEAL PROTECT MOISTURIZR: HCPCS

## 2023-12-06 PROCEDURE — 11000004 HC SNF PRIVATE

## 2023-12-06 PROCEDURE — 25000003 PHARM REV CODE 250: Performed by: REGISTERED NURSE

## 2023-12-06 PROCEDURE — 27000941

## 2023-12-06 PROCEDURE — 82962 GLUCOSE BLOOD TEST: CPT

## 2023-12-06 PROCEDURE — 94003 VENT MGMT INPAT SUBQ DAY: CPT

## 2023-12-06 PROCEDURE — 25000242 PHARM REV CODE 250 ALT 637 W/ HCPCS: Performed by: INTERNAL MEDICINE

## 2023-12-06 PROCEDURE — 25000003 PHARM REV CODE 250: Performed by: INTERNAL MEDICINE

## 2023-12-06 PROCEDURE — 27200966 HC CLOSED SUCTION SYSTEM

## 2023-12-06 PROCEDURE — 63600175 PHARM REV CODE 636 W HCPCS: Performed by: INTERNAL MEDICINE

## 2023-12-06 PROCEDURE — 94761 N-INVAS EAR/PLS OXIMETRY MLT: CPT

## 2023-12-06 PROCEDURE — 27000221 HC OXYGEN, UP TO 24 HOURS

## 2023-12-06 PROCEDURE — 99900026 HC AIRWAY MAINTENANCE (STAT)

## 2023-12-06 PROCEDURE — 99900035 HC TECH TIME PER 15 MIN (STAT)

## 2023-12-06 PROCEDURE — 27000982 HC MATTRESS, MATRIX LAL RENTAL

## 2023-12-06 RX ADMIN — POTASSIUM IODIDE 5 DROP: 1 SOLUTION ORAL at 05:12

## 2023-12-06 RX ADMIN — IPRATROPIUM BROMIDE AND ALBUTEROL SULFATE 3 ML: 2.5; .5 SOLUTION RESPIRATORY (INHALATION) at 07:12

## 2023-12-06 RX ADMIN — APIXABAN 5 MG: 2.5 TABLET, FILM COATED ORAL at 09:12

## 2023-12-06 RX ADMIN — BUDESONIDE 0.5 MG: 0.5 INHALANT ORAL at 07:12

## 2023-12-06 RX ADMIN — POTASSIUM IODIDE 5 DROP: 1 SOLUTION ORAL at 12:12

## 2023-12-06 RX ADMIN — PANTOPRAZOLE SODIUM 40 MG: 40 GRANULE, DELAYED RELEASE ORAL at 09:12

## 2023-12-06 RX ADMIN — LEVETIRACETAM 500 MG: 500 SOLUTION ORAL at 09:12

## 2023-12-06 RX ADMIN — POTASSIUM IODIDE 5 DROP: 1 SOLUTION ORAL at 09:12

## 2023-12-06 RX ADMIN — PREDNISONE 20 MG: 20 TABLET ORAL at 09:12

## 2023-12-06 NOTE — PLAN OF CARE
Problem: Adult Inpatient Plan of Care  Goal: Patient-Specific Goal (Individualized)  Outcome: Ongoing, Progressing     Problem: Nutrition Impairment (Mechanical Ventilation, Invasive)  Goal: Optimal Nutrition Delivery  Outcome: Ongoing, Progressing     Problem: Skin and Tissue Injury (Artificial Airway)  Goal: Absence of Device-Related Skin or Tissue Injury  Outcome: Ongoing, Progressing     Problem: Diabetes Comorbidity  Goal: Blood Glucose Level Within Targeted Range  Outcome: Ongoing, Progressing

## 2023-12-06 NOTE — PLAN OF CARE
[facility] - Medical Surgical Unit  Discharge Reassessment    Primary Care Provider: Nati Doyle FNP    Expected Discharge Date:     Reassessment (most recent)       Discharge Reassessment - 12/06/23 1320          Discharge Reassessment    Assessment Type Discharge Planning Brief Assessment     Did the patient's condition or plan change since previous assessment? No     Discharge Plan discussed with: Parent(s)     Name(s) and Number(s) [place]     Communicated CRICKET with patient/caregiver Date not available/Unable to determine     Discharge Plan A Home with family;Home Health     DME Needed Upon Discharge  feeding device;hospital bed;lift device;nutrition supplies;oxygen;respiratory supplies;suction machine;urinary catheter supplies;ventilator     Transition of Care Barriers None     Why the patient remains in the hospital Requires continued medical care        Post-Acute Status    Post-Acute Authorization Home Health     Coverage medicare and medicaid     Patient choice form signed by patient/caregiver List with quality metrics by geographic area provided     Discharge Delays None known at this time                   Pt had set back from weaning and is now doing 5 hours of TC 35%^ cont to follow for discharge assistance

## 2023-12-06 NOTE — PLAN OF CARE
Weekly Swing Bed Note    Patient is doing trach collar 30% trial Q 6 hours Bid today.  Will rest on AVAPS with  ad Fio2 35%.  Patient has a size 8 cuffed trach.  BBS coarse with Rhonchi.  Sat today has been 92 to 98% on o2 today.  Patient has moderate yellow thick secretions.

## 2023-12-06 NOTE — CARE UPDATE
[facility] - Medical Surgical Unit - Swing Bed   Interdisciplinary Team Meeting    Patient: Blue Abdul   Today's Date: 12/6/2023   Estimated D/C Date:         Physician: Ramiro Flores MD Nurse Practitioner:  hernando   Pharmacy:  [facility] Unit Director: Holly Carrillo   : Anita Krause Physical/Occupational Therapy: Jadon Mark   Speech Therapy:  Monica Anderson Activity Therapy: hernando   Nursing: Holly Carrillo  Respiratory: Tona Beckwith Dietary: Kuldeep Boggs  Other: na     Nurse  New Symptoms/Problems: na  Last Bowel Movement: 12/05/23   Urine: incontinent  Rojas: Yes  Bowel: incontinent   Constipated: No  Diarrhea: No   Isolation: Yes  Wound Care: No  Wound Location/Tx: na  Cognition: patient unresponsive  Aspiration Precautions: Yes  Comment(s): hernando    Respiratory   O2 Device: Trach Collar  O2 Flow: 35  SpO2: 100%  Neb Tx: No  Comment(s): TC 35% 5 hours AVAPS     Dietary  Nutrition: NPO  Comment(s): Glucerna @40cc hr 125cc flush q 3 hour    Speech Therapy  Speech/Swallowing: No current speech or swallowing issues  Comment(s): hernando    Physical Therapy  Gait/Assistive Device: unable  ELOS: Plan to DC     Transfers: Total Assistance  Bed Mobility: Total Assistance Range of Motion/Restrictions: na  Comment(s): hernando     Occupational Therapy  Eating/Grooming: Activity did not occur Toileting: Total Assistance   Bathing: Total Assistance Dressing (Upper Body): Total Assistance   Dressing (Lower Body): Total Assistance Comment(s): hernando     Pharmacy  Medication Changes (see all MD orders in chart): Yes  Labs Reviewed: Yes  New Lab Orders: No  Comment(s): decrease Predisone to 20 mg daily       Tx Plan/Recommendations reviewed with family and/or patient on (date) 12/6/23.  Additional family Conference/Training: hernando  D/C Plan/Recommendations: Home with family  CRICKET:   Comment(s): hernando

## 2023-12-06 NOTE — PLAN OF CARE
Problem: Communication Impairment (Mechanical Ventilation, Invasive)  Goal: Effective Communication  Outcome: Ongoing, Progressing     Problem: Device-Related Complication Risk (Mechanical Ventilation, Invasive)  Goal: Optimal Device Function  Outcome: Ongoing, Progressing     Problem: Inability to Wean (Mechanical Ventilation, Invasive)  Goal: Mechanical Ventilation Liberation  Outcome: Ongoing, Progressing     Problem: Communication Impairment (Artificial Airway)  Goal: Effective Communication  Outcome: Ongoing, Progressing     Problem: Gas Exchange Impaired  Goal: Optimal Gas Exchange  Outcome: Ongoing, Progressing     Problem: Communication Impairment (Mechanical Ventilation, Invasive)  Goal: Effective Communication  Outcome: Ongoing, Progressing     Problem: Device-Related Complication Risk (Mechanical Ventilation, Invasive)  Goal: Optimal Device Function  Outcome: Ongoing, Progressing     Problem: Inability to Wean (Mechanical Ventilation, Invasive)  Goal: Mechanical Ventilation Liberation  Outcome: Ongoing, Progressing     Problem: Communication Impairment (Artificial Airway)  Goal: Effective Communication  Outcome: Ongoing, Progressing     Problem: Gas Exchange Impaired  Goal: Optimal Gas Exchange  Outcome: Ongoing, Progressing

## 2023-12-06 NOTE — PROGRESS NOTES
Wt: 198#  RDN visited pt this p.m.  No problems noted with TF.  Last BM .  B-212.  Pt continues to do TC trials.  Continue NS.

## 2023-12-06 NOTE — PLAN OF CARE
Problem: Communication Impairment (Mechanical Ventilation, Invasive)  Goal: Effective Communication  Outcome: Ongoing, Progressing     Problem: Device-Related Complication Risk (Mechanical Ventilation, Invasive)  Goal: Optimal Device Function  Outcome: Ongoing, Progressing     Problem: Inability to Wean (Mechanical Ventilation, Invasive)  Goal: Mechanical Ventilation Liberation  Outcome: Ongoing, Progressing     Problem: Nutrition Impairment (Mechanical Ventilation, Invasive)  Goal: Optimal Nutrition Delivery  Outcome: Ongoing, Progressing     Problem: Skin and Tissue Injury (Mechanical Ventilation, Invasive)  Goal: Absence of Device-Related Skin and Tissue Injury  Outcome: Ongoing, Progressing     Problem: Ventilator-Induced Lung Injury (Mechanical Ventilation, Invasive)  Goal: Absence of Ventilator-Induced Lung Injury  Outcome: Ongoing, Progressing     Problem: Communication Impairment (Artificial Airway)  Goal: Effective Communication  Outcome: Ongoing, Progressing     Problem: Device-Related Complication Risk (Artificial Airway)  Goal: Optimal Device Function  Outcome: Ongoing, Progressing     Problem: Noninvasive Ventilation Acute  Goal: Effective Unassisted Ventilation and Oxygenation  Outcome: Ongoing, Progressing     Problem: Gas Exchange Impaired  Goal: Optimal Gas Exchange  Outcome: Ongoing, Progressing

## 2023-12-06 NOTE — PLAN OF CARE
Problem: Adult Inpatient Plan of Care  Goal: Plan of Care Review  Outcome: Ongoing, Progressing  Goal: Patient-Specific Goal (Individualized)  Outcome: Ongoing, Progressing  Goal: Absence of Hospital-Acquired Illness or Injury  Outcome: Ongoing, Progressing  Goal: Optimal Comfort and Wellbeing  Outcome: Ongoing, Progressing     Problem: Communication Impairment (Mechanical Ventilation, Invasive)  Goal: Effective Communication  Outcome: Ongoing, Progressing     Problem: Device-Related Complication Risk (Mechanical Ventilation, Invasive)  Goal: Optimal Device Function  Outcome: Ongoing, Progressing     Problem: Inability to Wean (Mechanical Ventilation, Invasive)  Goal: Mechanical Ventilation Liberation  Outcome: Ongoing, Progressing     Problem: Nutrition Impairment (Mechanical Ventilation, Invasive)  Goal: Optimal Nutrition Delivery  Outcome: Ongoing, Progressing     Problem: Skin and Tissue Injury (Mechanical Ventilation, Invasive)  Goal: Absence of Device-Related Skin and Tissue Injury  Outcome: Ongoing, Progressing     Problem: Communication Impairment (Artificial Airway)  Goal: Effective Communication  Outcome: Ongoing, Progressing     Problem: Device-Related Complication Risk (Artificial Airway)  Goal: Optimal Device Function  Outcome: Ongoing, Progressing     Problem: Skin and Tissue Injury (Artificial Airway)  Goal: Absence of Device-Related Skin or Tissue Injury  Outcome: Ongoing, Progressing     Problem: Noninvasive Ventilation Acute  Goal: Effective Unassisted Ventilation and Oxygenation  Outcome: Ongoing, Progressing     Problem: Fall Injury Risk  Goal: Absence of Fall and Fall-Related Injury  Outcome: Ongoing, Progressing     Problem: Gas Exchange Impaired  Goal: Optimal Gas Exchange  Outcome: Ongoing, Progressing

## 2023-12-06 NOTE — CARE UPDATE
0720    Placed pt on TC @ 35% X 6hrs     Nurse notified    Will continue to monitor pt    02-97%        1320    Pt completed TC X 6hrs    Nurse notified     Pt placed back on avaps @ 35%    02-98%      Size 8 trach    Sx thick yellow sputum- moderate

## 2023-12-06 NOTE — PROGRESS NOTES
Inflated patient's trach cuff with sterile water using the minimal leak technique and placed patient back on the vent via AVAP-AE with previous settings for rest. Heart Rate 108, Respiratory rate 20, O2 sats 98% on 35% FIO2, and Blood Pressure 108/63.  Patient did 5 hours of Trach Collar 35% and did good overall.  At times patient's respiratory rate would increase and he would use his accessory muscles but I noticed it happenes whenever he would be turned or suctioned.  Patient would settle down and would breath fine with easy, even respirations. Patient resting well on AVAP-AE @ this time.

## 2023-12-06 NOTE — PROGRESS NOTES
Deflated patient trach cuff and placed patient on trach collar 35%.   Heart Rate 118, Respiratory rate 28, O2 sats 92% on Trach Collar 35%, Blood Pressure 115/68.  Patient tolerating Trach Collar trial well at this time.  Will continue to monitor patient closely.

## 2023-12-06 NOTE — PLAN OF CARE
Problem: Gas Exchange Impaired  Goal: Optimal Gas Exchange  Outcome: Ongoing, Progressing     Problem: Bariatric Environmental Safety  Goal: Safety Maintained with Care  Outcome: Ongoing, Progressing

## 2023-12-07 LAB — GLUCOSE SERPL-MCNC: 168 MG/DL (ref 70–105)

## 2023-12-07 PROCEDURE — 11000004 HC SNF PRIVATE

## 2023-12-07 PROCEDURE — 25000003 PHARM REV CODE 250: Performed by: INTERNAL MEDICINE

## 2023-12-07 PROCEDURE — 25000003 PHARM REV CODE 250: Performed by: REGISTERED NURSE

## 2023-12-07 PROCEDURE — 82962 GLUCOSE BLOOD TEST: CPT

## 2023-12-07 PROCEDURE — 94640 AIRWAY INHALATION TREATMENT: CPT

## 2023-12-07 PROCEDURE — 25000242 PHARM REV CODE 250 ALT 637 W/ HCPCS: Performed by: INTERNAL MEDICINE

## 2023-12-07 PROCEDURE — 63600175 PHARM REV CODE 636 W HCPCS: Performed by: INTERNAL MEDICINE

## 2023-12-07 PROCEDURE — 27000221 HC OXYGEN, UP TO 24 HOURS

## 2023-12-07 PROCEDURE — 27200966 HC CLOSED SUCTION SYSTEM

## 2023-12-07 PROCEDURE — 94761 N-INVAS EAR/PLS OXIMETRY MLT: CPT

## 2023-12-07 PROCEDURE — 94003 VENT MGMT INPAT SUBQ DAY: CPT

## 2023-12-07 PROCEDURE — 99900035 HC TECH TIME PER 15 MIN (STAT)

## 2023-12-07 PROCEDURE — 99900026 HC AIRWAY MAINTENANCE (STAT)

## 2023-12-07 RX ADMIN — APIXABAN 5 MG: 2.5 TABLET, FILM COATED ORAL at 09:12

## 2023-12-07 RX ADMIN — BUDESONIDE 0.5 MG: 0.5 INHALANT ORAL at 07:12

## 2023-12-07 RX ADMIN — POTASSIUM IODIDE 5 DROP: 1 SOLUTION ORAL at 09:12

## 2023-12-07 RX ADMIN — PANTOPRAZOLE SODIUM 40 MG: 40 GRANULE, DELAYED RELEASE ORAL at 09:12

## 2023-12-07 RX ADMIN — PREDNISONE 20 MG: 20 TABLET ORAL at 09:12

## 2023-12-07 RX ADMIN — LEVETIRACETAM 500 MG: 500 SOLUTION ORAL at 09:12

## 2023-12-07 RX ADMIN — POTASSIUM IODIDE 5 DROP: 1 SOLUTION ORAL at 05:12

## 2023-12-07 RX ADMIN — IPRATROPIUM BROMIDE AND ALBUTEROL SULFATE 3 ML: 2.5; .5 SOLUTION RESPIRATORY (INHALATION) at 07:12

## 2023-12-07 RX ADMIN — POTASSIUM IODIDE 5 DROP: 1 SOLUTION ORAL at 12:12

## 2023-12-07 NOTE — CARE UPDATE
12/07/23 0806   Patient Assessment/Suction   Level of Consciousness (AVPU) alert   Respiratory Effort Normal;Unlabored   Expansion/Accessory Muscles/Retractions no retractions   Ready to Wean Parameters   Spon. Breathing Trial Initiated? Initiated  (placed on trach collar for 7 hours BID)

## 2023-12-07 NOTE — CARE UPDATE
Patient did well the whole 7 hours of trach collar trial on 30% FiO2. Patient was placed back on AVAPS

## 2023-12-08 LAB — GLUCOSE SERPL-MCNC: 156 MG/DL (ref 70–105)

## 2023-12-08 PROCEDURE — 99900035 HC TECH TIME PER 15 MIN (STAT)

## 2023-12-08 PROCEDURE — 25000003 PHARM REV CODE 250: Performed by: REGISTERED NURSE

## 2023-12-08 PROCEDURE — 25000003 PHARM REV CODE 250: Performed by: INTERNAL MEDICINE

## 2023-12-08 PROCEDURE — 99900026 HC AIRWAY MAINTENANCE (STAT)

## 2023-12-08 PROCEDURE — 25000242 PHARM REV CODE 250 ALT 637 W/ HCPCS: Performed by: INTERNAL MEDICINE

## 2023-12-08 PROCEDURE — 82962 GLUCOSE BLOOD TEST: CPT

## 2023-12-08 PROCEDURE — 94761 N-INVAS EAR/PLS OXIMETRY MLT: CPT

## 2023-12-08 PROCEDURE — 11000004 HC SNF PRIVATE

## 2023-12-08 PROCEDURE — 27200966 HC CLOSED SUCTION SYSTEM

## 2023-12-08 PROCEDURE — 94640 AIRWAY INHALATION TREATMENT: CPT

## 2023-12-08 PROCEDURE — 27000221 HC OXYGEN, UP TO 24 HOURS

## 2023-12-08 PROCEDURE — 63600175 PHARM REV CODE 636 W HCPCS: Performed by: INTERNAL MEDICINE

## 2023-12-08 RX ADMIN — PREDNISONE 20 MG: 20 TABLET ORAL at 09:12

## 2023-12-08 RX ADMIN — IPRATROPIUM BROMIDE AND ALBUTEROL SULFATE 3 ML: 2.5; .5 SOLUTION RESPIRATORY (INHALATION) at 07:12

## 2023-12-08 RX ADMIN — LEVETIRACETAM 500 MG: 500 SOLUTION ORAL at 09:12

## 2023-12-08 RX ADMIN — BUDESONIDE 0.5 MG: 0.5 INHALANT ORAL at 07:12

## 2023-12-08 RX ADMIN — POTASSIUM IODIDE 5 DROP: 1 SOLUTION ORAL at 05:12

## 2023-12-08 RX ADMIN — POTASSIUM IODIDE 5 DROP: 1 SOLUTION ORAL at 01:12

## 2023-12-08 RX ADMIN — PANTOPRAZOLE SODIUM 40 MG: 40 GRANULE, DELAYED RELEASE ORAL at 09:12

## 2023-12-08 RX ADMIN — APIXABAN 5 MG: 2.5 TABLET, FILM COATED ORAL at 09:12

## 2023-12-08 RX ADMIN — POTASSIUM IODIDE 5 DROP: 1 SOLUTION ORAL at 09:12

## 2023-12-09 LAB — GLUCOSE SERPL-MCNC: 182 MG/DL (ref 70–105)

## 2023-12-09 PROCEDURE — 25000003 PHARM REV CODE 250: Performed by: REGISTERED NURSE

## 2023-12-09 PROCEDURE — 94640 AIRWAY INHALATION TREATMENT: CPT

## 2023-12-09 PROCEDURE — 99900035 HC TECH TIME PER 15 MIN (STAT)

## 2023-12-09 PROCEDURE — 27000221 HC OXYGEN, UP TO 24 HOURS

## 2023-12-09 PROCEDURE — 25000003 PHARM REV CODE 250: Performed by: INTERNAL MEDICINE

## 2023-12-09 PROCEDURE — 11000004 HC SNF PRIVATE

## 2023-12-09 PROCEDURE — 27000958

## 2023-12-09 PROCEDURE — 99900026 HC AIRWAY MAINTENANCE (STAT)

## 2023-12-09 PROCEDURE — 63600175 PHARM REV CODE 636 W HCPCS: Performed by: INTERNAL MEDICINE

## 2023-12-09 PROCEDURE — 94003 VENT MGMT INPAT SUBQ DAY: CPT

## 2023-12-09 PROCEDURE — 25000242 PHARM REV CODE 250 ALT 637 W/ HCPCS: Performed by: INTERNAL MEDICINE

## 2023-12-09 PROCEDURE — 94761 N-INVAS EAR/PLS OXIMETRY MLT: CPT

## 2023-12-09 PROCEDURE — 27200966 HC CLOSED SUCTION SYSTEM

## 2023-12-09 PROCEDURE — 82962 GLUCOSE BLOOD TEST: CPT

## 2023-12-09 PROCEDURE — 27000935

## 2023-12-09 RX ADMIN — PANTOPRAZOLE SODIUM 40 MG: 40 GRANULE, DELAYED RELEASE ORAL at 09:12

## 2023-12-09 RX ADMIN — BUDESONIDE 0.5 MG: 0.5 INHALANT ORAL at 07:12

## 2023-12-09 RX ADMIN — APIXABAN 5 MG: 2.5 TABLET, FILM COATED ORAL at 09:12

## 2023-12-09 RX ADMIN — POTASSIUM IODIDE 5 DROP: 1 SOLUTION ORAL at 05:12

## 2023-12-09 RX ADMIN — APIXABAN 5 MG: 2.5 TABLET, FILM COATED ORAL at 08:12

## 2023-12-09 RX ADMIN — PREDNISONE 20 MG: 20 TABLET ORAL at 08:12

## 2023-12-09 RX ADMIN — PANTOPRAZOLE SODIUM 40 MG: 40 GRANULE, DELAYED RELEASE ORAL at 08:12

## 2023-12-09 RX ADMIN — POTASSIUM IODIDE 5 DROP: 1 SOLUTION ORAL at 12:12

## 2023-12-09 RX ADMIN — POTASSIUM IODIDE 5 DROP: 1 SOLUTION ORAL at 09:12

## 2023-12-09 RX ADMIN — LEVETIRACETAM 500 MG: 500 SOLUTION ORAL at 08:12

## 2023-12-09 RX ADMIN — POTASSIUM IODIDE 5 DROP: 1 SOLUTION ORAL at 08:12

## 2023-12-09 RX ADMIN — LEVETIRACETAM 500 MG: 500 SOLUTION ORAL at 09:12

## 2023-12-09 RX ADMIN — IPRATROPIUM BROMIDE AND ALBUTEROL SULFATE 3 ML: 2.5; .5 SOLUTION RESPIRATORY (INHALATION) at 07:12

## 2023-12-09 NOTE — CARE UPDATE
0716    Placed pt on Trach collar X 9hrs    Nurse notified    Will continue to monitor pt    %        1615    Pt completed TC trial X 9 hrs    No problem's    Placed back on avaps     Nurse notified    %

## 2023-12-09 NOTE — PLAN OF CARE
Problem: Communication Impairment (Mechanical Ventilation, Invasive)  Goal: Effective Communication  Outcome: Ongoing, Progressing     Problem: Device-Related Complication Risk (Mechanical Ventilation, Invasive)  Goal: Optimal Device Function  Outcome: Ongoing, Progressing     Problem: Inability to Wean (Mechanical Ventilation, Invasive)  Goal: Mechanical Ventilation Liberation  Outcome: Ongoing, Progressing     Problem: Communication Impairment (Artificial Airway)  Goal: Effective Communication  Outcome: Ongoing, Progressing     Problem: Device-Related Complication Risk (Artificial Airway)  Goal: Optimal Device Function  Outcome: Ongoing, Progressing

## 2023-12-09 NOTE — PLAN OF CARE
Problem: Adult Inpatient Plan of Care  Goal: Plan of Care Review  Outcome: Ongoing, Progressing     Problem: Adult Inpatient Plan of Care  Goal: Optimal Comfort and Wellbeing  Outcome: Ongoing, Progressing     Problem: Device-Related Complication Risk (Mechanical Ventilation, Invasive)  Goal: Optimal Device Function  Outcome: Ongoing, Progressing

## 2023-12-10 LAB — GLUCOSE SERPL-MCNC: 184 MG/DL (ref 70–105)

## 2023-12-10 PROCEDURE — 99900026 HC AIRWAY MAINTENANCE (STAT)

## 2023-12-10 PROCEDURE — 63600175 PHARM REV CODE 636 W HCPCS: Performed by: INTERNAL MEDICINE

## 2023-12-10 PROCEDURE — 82962 GLUCOSE BLOOD TEST: CPT

## 2023-12-10 PROCEDURE — 25000003 PHARM REV CODE 250: Performed by: REGISTERED NURSE

## 2023-12-10 PROCEDURE — 27000221 HC OXYGEN, UP TO 24 HOURS

## 2023-12-10 PROCEDURE — 11000004 HC SNF PRIVATE

## 2023-12-10 PROCEDURE — 99900035 HC TECH TIME PER 15 MIN (STAT)

## 2023-12-10 PROCEDURE — 94003 VENT MGMT INPAT SUBQ DAY: CPT

## 2023-12-10 PROCEDURE — 25000003 PHARM REV CODE 250: Performed by: INTERNAL MEDICINE

## 2023-12-10 PROCEDURE — 25000242 PHARM REV CODE 250 ALT 637 W/ HCPCS: Performed by: INTERNAL MEDICINE

## 2023-12-10 PROCEDURE — 94761 N-INVAS EAR/PLS OXIMETRY MLT: CPT

## 2023-12-10 PROCEDURE — 94640 AIRWAY INHALATION TREATMENT: CPT

## 2023-12-10 RX ORDER — ONDANSETRON 4 MG/1
4 TABLET, FILM COATED ORAL EVERY 6 HOURS PRN
Status: DISCONTINUED | OUTPATIENT
Start: 2023-12-10 | End: 2023-12-10

## 2023-12-10 RX ORDER — ONDANSETRON 4 MG/1
4 TABLET, FILM COATED ORAL EVERY 6 HOURS PRN
Status: DISCONTINUED | OUTPATIENT
Start: 2023-12-10 | End: 2024-01-31 | Stop reason: HOSPADM

## 2023-12-10 RX ADMIN — ONDANSETRON HYDROCHLORIDE 4 MG: 4 TABLET, FILM COATED ORAL at 09:12

## 2023-12-10 RX ADMIN — BUDESONIDE 0.5 MG: 0.5 INHALANT ORAL at 07:12

## 2023-12-10 RX ADMIN — PREDNISONE 20 MG: 20 TABLET ORAL at 08:12

## 2023-12-10 RX ADMIN — APIXABAN 5 MG: 2.5 TABLET, FILM COATED ORAL at 08:12

## 2023-12-10 RX ADMIN — POTASSIUM IODIDE 5 DROP: 1 SOLUTION ORAL at 05:12

## 2023-12-10 RX ADMIN — POTASSIUM IODIDE 5 DROP: 1 SOLUTION ORAL at 08:12

## 2023-12-10 RX ADMIN — PANTOPRAZOLE SODIUM 40 MG: 40 GRANULE, DELAYED RELEASE ORAL at 09:12

## 2023-12-10 RX ADMIN — BUDESONIDE 0.5 MG: 0.5 INHALANT ORAL at 08:12

## 2023-12-10 RX ADMIN — LEVETIRACETAM 500 MG: 500 SOLUTION ORAL at 08:12

## 2023-12-10 RX ADMIN — LEVETIRACETAM 500 MG: 500 SOLUTION ORAL at 09:12

## 2023-12-10 RX ADMIN — IPRATROPIUM BROMIDE AND ALBUTEROL SULFATE 3 ML: 2.5; .5 SOLUTION RESPIRATORY (INHALATION) at 07:12

## 2023-12-10 RX ADMIN — POTASSIUM IODIDE 5 DROP: 1 SOLUTION ORAL at 01:12

## 2023-12-10 RX ADMIN — POTASSIUM IODIDE 5 DROP: 1 SOLUTION ORAL at 09:12

## 2023-12-10 RX ADMIN — APIXABAN 5 MG: 2.5 TABLET, FILM COATED ORAL at 09:12

## 2023-12-10 RX ADMIN — PANTOPRAZOLE SODIUM 40 MG: 40 GRANULE, DELAYED RELEASE ORAL at 08:12

## 2023-12-10 NOTE — CARE UPDATE
0702    Placed pt on TC @ 30% X 10 hours today    Nurse notified    Will continue to monitor pt     %        1710    Pt completed TC X 10 hours with no problems    Placed pt back on avaps @ 30%    Nurse notified    %

## 2023-12-10 NOTE — PLAN OF CARE
Problem: Communication Impairment (Mechanical Ventilation, Invasive)  Goal: Effective Communication  Outcome: Ongoing, Progressing     Problem: Device-Related Complication Risk (Mechanical Ventilation, Invasive)  Goal: Optimal Device Function  Outcome: Ongoing, Progressing     Problem: Inability to Wean (Mechanical Ventilation, Invasive)  Goal: Mechanical Ventilation Liberation  Outcome: Ongoing, Progressing     Problem: Communication Impairment (Artificial Airway)  Goal: Effective Communication  Outcome: Ongoing, Progressing     Problem: Gas Exchange Impaired  Goal: Optimal Gas Exchange  Outcome: Ongoing, Progressing

## 2023-12-11 LAB — GLUCOSE SERPL-MCNC: 185 MG/DL (ref 70–105)

## 2023-12-11 PROCEDURE — 94640 AIRWAY INHALATION TREATMENT: CPT

## 2023-12-11 PROCEDURE — 25000242 PHARM REV CODE 250 ALT 637 W/ HCPCS: Performed by: INTERNAL MEDICINE

## 2023-12-11 PROCEDURE — 99900026 HC AIRWAY MAINTENANCE (STAT)

## 2023-12-11 PROCEDURE — 27000958

## 2023-12-11 PROCEDURE — 82962 GLUCOSE BLOOD TEST: CPT

## 2023-12-11 PROCEDURE — 25000003 PHARM REV CODE 250: Performed by: INTERNAL MEDICINE

## 2023-12-11 PROCEDURE — 27000935

## 2023-12-11 PROCEDURE — 25000003 PHARM REV CODE 250: Performed by: REGISTERED NURSE

## 2023-12-11 PROCEDURE — 99900035 HC TECH TIME PER 15 MIN (STAT)

## 2023-12-11 PROCEDURE — 94761 N-INVAS EAR/PLS OXIMETRY MLT: CPT

## 2023-12-11 PROCEDURE — 94003 VENT MGMT INPAT SUBQ DAY: CPT

## 2023-12-11 PROCEDURE — A6250 SKIN SEAL PROTECT MOISTURIZR: HCPCS

## 2023-12-11 PROCEDURE — 27000492 HC SLEEVE, SCD T/L

## 2023-12-11 PROCEDURE — 11000004 HC SNF PRIVATE

## 2023-12-11 PROCEDURE — 27000716 HC OXISENSOR PROBE, ANY SIZE

## 2023-12-11 PROCEDURE — 27000221 HC OXYGEN, UP TO 24 HOURS

## 2023-12-11 PROCEDURE — 63600175 PHARM REV CODE 636 W HCPCS: Performed by: INTERNAL MEDICINE

## 2023-12-11 RX ADMIN — BUDESONIDE 0.5 MG: 0.5 INHALANT ORAL at 07:12

## 2023-12-11 RX ADMIN — APIXABAN 5 MG: 2.5 TABLET, FILM COATED ORAL at 09:12

## 2023-12-11 RX ADMIN — PANTOPRAZOLE SODIUM 40 MG: 40 GRANULE, DELAYED RELEASE ORAL at 08:12

## 2023-12-11 RX ADMIN — POTASSIUM IODIDE 5 DROP: 1 SOLUTION ORAL at 05:12

## 2023-12-11 RX ADMIN — PANTOPRAZOLE SODIUM 40 MG: 40 GRANULE, DELAYED RELEASE ORAL at 09:12

## 2023-12-11 RX ADMIN — APIXABAN 5 MG: 2.5 TABLET, FILM COATED ORAL at 08:12

## 2023-12-11 RX ADMIN — IPRATROPIUM BROMIDE AND ALBUTEROL SULFATE 3 ML: 2.5; .5 SOLUTION RESPIRATORY (INHALATION) at 07:12

## 2023-12-11 RX ADMIN — POTASSIUM IODIDE 5 DROP: 1 SOLUTION ORAL at 09:12

## 2023-12-11 RX ADMIN — LEVETIRACETAM 500 MG: 500 SOLUTION ORAL at 09:12

## 2023-12-11 RX ADMIN — LEVETIRACETAM 500 MG: 500 SOLUTION ORAL at 08:12

## 2023-12-11 RX ADMIN — PREDNISONE 20 MG: 20 TABLET ORAL at 09:12

## 2023-12-11 RX ADMIN — POTASSIUM IODIDE 5 DROP: 1 SOLUTION ORAL at 01:12

## 2023-12-11 RX ADMIN — POTASSIUM IODIDE 5 DROP: 1 SOLUTION ORAL at 08:12

## 2023-12-11 NOTE — CARE UPDATE
Placed pt om trach collar at 30%. Pts goal is 11 hours on trach collar. Pt tolerated transition well.     Spo2 100%    RR 25

## 2023-12-11 NOTE — PLAN OF CARE
Problem: Adult Inpatient Plan of Care  Goal: Optimal Comfort and Wellbeing  Outcome: Ongoing, Progressing     Problem: Inability to Wean (Mechanical Ventilation, Invasive)  Goal: Mechanical Ventilation Liberation  Outcome: Ongoing, Progressing     Problem: Skin and Tissue Injury (Artificial Airway)  Goal: Absence of Device-Related Skin or Tissue Injury  Outcome: Ongoing, Progressing     Problem: Bariatric Environmental Safety  Goal: Safety Maintained with Care  Outcome: Ongoing, Progressing

## 2023-12-11 NOTE — NURSING
Pt vomited large amount of mucus and tube feeding. Turned onto left side and turned off tube feeding. Cleaned pt up, notified Bright Saravia NP. KUB ordered and Zofran ordered. Mother made aware.

## 2023-12-11 NOTE — PLAN OF CARE
[facility] - [facility] Unit  Discharge Reassessment    Primary Care Provider: Nati Doyle FNP    Expected Discharge Date:     Reassessment (most recent)       Discharge Reassessment - 12/11/23 1621          Discharge Reassessment    Assessment Type Discharge Planning Assessment     Did the patient's condition or plan change since previous assessment? No     Discharge Plan discussed with: Parent(s)     Name(s) and Number(s) Linn     Communicated CRICKET with patient/caregiver Date not available/Unable to determine     Discharge Plan A Home with family;Home Health     DME Needed Upon Discharge  feeding device;hospital bed;lift device;oxygen;nutrition supplies;respiratory supplies;suction machine;ventilator     Transition of Care Barriers None     Why the patient remains in the hospital Requires continued medical care        Post-Acute Status    Post-Acute Authorization Home Health     Coverage medicare medicaid     Discharge Delays None known at this time                   Cont to wean pt slowly , kiya assists with dishcarge plan of pt going home with parents and DME needed

## 2023-12-12 LAB
ANION GAP SERPL CALCULATED.3IONS-SCNC: 14 MMOL/L (ref 7–16)
BASOPHILS # BLD AUTO: 0.04 K/UL (ref 0–0.2)
BASOPHILS NFR BLD AUTO: 0.3 % (ref 0–1)
BUN SERPL-MCNC: 11 MG/DL (ref 7–18)
BUN/CREAT SERPL: 18 (ref 6–20)
CALCIUM SERPL-MCNC: 9.2 MG/DL (ref 8.5–10.1)
CHLORIDE SERPL-SCNC: 99 MMOL/L (ref 98–107)
CO2 SERPL-SCNC: 27 MMOL/L (ref 21–32)
CREAT SERPL-MCNC: 0.62 MG/DL (ref 0.7–1.3)
DIFFERENTIAL METHOD BLD: ABNORMAL
EGFR (NO RACE VARIABLE) (RUSH/TITUS): 131 ML/MIN/1.73M2
EOSINOPHIL # BLD AUTO: 0.09 K/UL (ref 0–0.5)
EOSINOPHIL NFR BLD AUTO: 0.6 % (ref 1–4)
ERYTHROCYTE [DISTWIDTH] IN BLOOD BY AUTOMATED COUNT: 18.3 % (ref 11.5–14.5)
GLUCOSE SERPL-MCNC: 169 MG/DL (ref 74–106)
HCT VFR BLD AUTO: 33.1 % (ref 40–54)
HGB BLD-MCNC: 9.9 G/DL (ref 13.5–18)
LYMPHOCYTES # BLD AUTO: 1.63 K/UL (ref 1–4.8)
LYMPHOCYTES NFR BLD AUTO: 11.1 % (ref 27–41)
LYMPHOCYTES NFR BLD MANUAL: 8 % (ref 27–41)
MCH RBC QN AUTO: 28 PG (ref 27–31)
MCHC RBC AUTO-ENTMCNC: 29.9 G/DL (ref 32–36)
MCV RBC AUTO: 93.8 FL (ref 80–96)
MONOCYTES # BLD AUTO: 0.84 K/UL (ref 0–0.8)
MONOCYTES NFR BLD AUTO: 5.7 % (ref 2–6)
MONOCYTES NFR BLD MANUAL: 7 % (ref 2–6)
MPC BLD CALC-MCNC: 10.4 FL (ref 9.4–12.4)
NEUTROPHILS # BLD AUTO: 12.1 K/UL (ref 1.8–7.7)
NEUTROPHILS NFR BLD AUTO: 82.3 % (ref 53–65)
NEUTS BAND NFR BLD MANUAL: 6 % (ref 1–5)
NEUTS SEG NFR BLD MANUAL: 79 % (ref 50–62)
NRBC BLD MANUAL-RTO: ABNORMAL %
PLATELET # BLD AUTO: 522 K/UL (ref 150–400)
POTASSIUM SERPL-SCNC: 3.6 MMOL/L (ref 3.5–5.1)
RBC # BLD AUTO: 3.53 M/UL (ref 4.6–6.2)
SODIUM SERPL-SCNC: 136 MMOL/L (ref 136–145)
WBC # BLD AUTO: 14.7 K/UL (ref 4.5–11)

## 2023-12-12 PROCEDURE — 25000003 PHARM REV CODE 250: Performed by: INTERNAL MEDICINE

## 2023-12-12 PROCEDURE — 94640 AIRWAY INHALATION TREATMENT: CPT

## 2023-12-12 PROCEDURE — A6212 FOAM DRG <=16 SQ IN W/BORDER: HCPCS

## 2023-12-12 PROCEDURE — 27000941

## 2023-12-12 PROCEDURE — 25000003 PHARM REV CODE 250: Performed by: REGISTERED NURSE

## 2023-12-12 PROCEDURE — 27000935

## 2023-12-12 PROCEDURE — 63600175 PHARM REV CODE 636 W HCPCS: Performed by: INTERNAL MEDICINE

## 2023-12-12 PROCEDURE — 25000242 PHARM REV CODE 250 ALT 637 W/ HCPCS: Performed by: INTERNAL MEDICINE

## 2023-12-12 PROCEDURE — 99900026 HC AIRWAY MAINTENANCE (STAT)

## 2023-12-12 PROCEDURE — 85025 COMPLETE CBC W/AUTO DIFF WBC: CPT | Performed by: INTERNAL MEDICINE

## 2023-12-12 PROCEDURE — 11000004 HC SNF PRIVATE

## 2023-12-12 PROCEDURE — 99900035 HC TECH TIME PER 15 MIN (STAT)

## 2023-12-12 PROCEDURE — 94003 VENT MGMT INPAT SUBQ DAY: CPT

## 2023-12-12 PROCEDURE — 94761 N-INVAS EAR/PLS OXIMETRY MLT: CPT

## 2023-12-12 PROCEDURE — 80048 BASIC METABOLIC PNL TOTAL CA: CPT | Performed by: INTERNAL MEDICINE

## 2023-12-12 PROCEDURE — 27000221 HC OXYGEN, UP TO 24 HOURS

## 2023-12-12 RX ADMIN — LEVETIRACETAM 500 MG: 500 SOLUTION ORAL at 09:12

## 2023-12-12 RX ADMIN — POTASSIUM IODIDE 5 DROP: 1 SOLUTION ORAL at 12:12

## 2023-12-12 RX ADMIN — LEVETIRACETAM 500 MG: 500 SOLUTION ORAL at 08:12

## 2023-12-12 RX ADMIN — DICLOFENAC 2 G: 10 GEL TOPICAL at 09:12

## 2023-12-12 RX ADMIN — BUDESONIDE 0.5 MG: 0.5 INHALANT ORAL at 08:12

## 2023-12-12 RX ADMIN — PANTOPRAZOLE SODIUM 40 MG: 40 GRANULE, DELAYED RELEASE ORAL at 09:12

## 2023-12-12 RX ADMIN — APIXABAN 5 MG: 2.5 TABLET, FILM COATED ORAL at 08:12

## 2023-12-12 RX ADMIN — BUDESONIDE 0.5 MG: 0.5 INHALANT ORAL at 07:12

## 2023-12-12 RX ADMIN — PANTOPRAZOLE SODIUM 40 MG: 40 GRANULE, DELAYED RELEASE ORAL at 08:12

## 2023-12-12 RX ADMIN — POTASSIUM IODIDE 5 DROP: 1 SOLUTION ORAL at 08:12

## 2023-12-12 RX ADMIN — IPRATROPIUM BROMIDE AND ALBUTEROL SULFATE 3 ML: 2.5; .5 SOLUTION RESPIRATORY (INHALATION) at 07:12

## 2023-12-12 RX ADMIN — PREDNISONE 20 MG: 20 TABLET ORAL at 09:12

## 2023-12-12 RX ADMIN — POTASSIUM IODIDE 5 DROP: 1 SOLUTION ORAL at 05:12

## 2023-12-12 RX ADMIN — APIXABAN 5 MG: 2.5 TABLET, FILM COATED ORAL at 09:12

## 2023-12-12 RX ADMIN — POTASSIUM IODIDE 5 DROP: 1 SOLUTION ORAL at 09:12

## 2023-12-12 NOTE — PROGRESS NOTES
Wt: 198#  RDN visited pt this a.m. and talked with pt's mother, Vomiting x 2 over the weekend. No inc residuals.  WBC 14.7 noted fever last p.m. KUB WNL.  Last BM 12/12.  Lab reviewed.  No further problems.  Continue POC.

## 2023-12-12 NOTE — CARE UPDATE
Placed pt on trach collar at 30% for SBT. Pts goal today is 12 hours. Pt was agitated and aroused during transition.   Spo2 100%    RR 25

## 2023-12-13 LAB — GLUCOSE SERPL-MCNC: 172 MG/DL (ref 70–105)

## 2023-12-13 PROCEDURE — 99900026 HC AIRWAY MAINTENANCE (STAT)

## 2023-12-13 PROCEDURE — 94003 VENT MGMT INPAT SUBQ DAY: CPT

## 2023-12-13 PROCEDURE — 27000941

## 2023-12-13 PROCEDURE — 82962 GLUCOSE BLOOD TEST: CPT

## 2023-12-13 PROCEDURE — 27000221 HC OXYGEN, UP TO 24 HOURS

## 2023-12-13 PROCEDURE — 11000004 HC SNF PRIVATE

## 2023-12-13 PROCEDURE — 94761 N-INVAS EAR/PLS OXIMETRY MLT: CPT

## 2023-12-13 PROCEDURE — 63600175 PHARM REV CODE 636 W HCPCS: Performed by: INTERNAL MEDICINE

## 2023-12-13 PROCEDURE — 99310 SBSQ NF CARE HIGH MDM 45: CPT | Mod: ,,, | Performed by: INTERNAL MEDICINE

## 2023-12-13 PROCEDURE — 25000003 PHARM REV CODE 250: Performed by: REGISTERED NURSE

## 2023-12-13 PROCEDURE — 27000935

## 2023-12-13 PROCEDURE — 99900035 HC TECH TIME PER 15 MIN (STAT)

## 2023-12-13 PROCEDURE — 25000242 PHARM REV CODE 250 ALT 637 W/ HCPCS: Performed by: INTERNAL MEDICINE

## 2023-12-13 PROCEDURE — 25000003 PHARM REV CODE 250: Performed by: INTERNAL MEDICINE

## 2023-12-13 PROCEDURE — 94640 AIRWAY INHALATION TREATMENT: CPT

## 2023-12-13 RX ADMIN — LEVETIRACETAM 500 MG: 500 SOLUTION ORAL at 08:12

## 2023-12-13 RX ADMIN — BUDESONIDE 0.5 MG: 0.5 INHALANT ORAL at 07:12

## 2023-12-13 RX ADMIN — POTASSIUM IODIDE 5 DROP: 1 SOLUTION ORAL at 08:12

## 2023-12-13 RX ADMIN — PREDNISONE 20 MG: 20 TABLET ORAL at 08:12

## 2023-12-13 RX ADMIN — IPRATROPIUM BROMIDE AND ALBUTEROL SULFATE 3 ML: 2.5; .5 SOLUTION RESPIRATORY (INHALATION) at 07:12

## 2023-12-13 RX ADMIN — PANTOPRAZOLE SODIUM 40 MG: 40 GRANULE, DELAYED RELEASE ORAL at 08:12

## 2023-12-13 RX ADMIN — APIXABAN 5 MG: 2.5 TABLET, FILM COATED ORAL at 08:12

## 2023-12-13 RX ADMIN — POTASSIUM IODIDE 5 DROP: 1 SOLUTION ORAL at 12:12

## 2023-12-13 RX ADMIN — POTASSIUM IODIDE 5 DROP: 1 SOLUTION ORAL at 05:12

## 2023-12-13 NOTE — CARE UPDATE
0704    Placed pt on TC @ 30% X 13 hours today    Nurse notified    Will continue to monitor pt    %

## 2023-12-13 NOTE — SUBJECTIVE & OBJECTIVE
Interval History: No acute events. Currently resting comfortably. More awake and alert today. Afebrile today and vital signs are stable.      Objective:     Vital Signs (Most Recent):  Temp: 98.4 °F (36.9 °C) (12/13/23 1100)  Pulse: 107 (12/13/23 1100)  Resp: (!) 21 (12/13/23 1100)  BP: (!) 99/57 (12/13/23 1100)  SpO2: 100 % (12/13/23 1150) Vital Signs (24h Range):  Temp:  [98.4 °F (36.9 °C)-99.9 °F (37.7 °C)] 98.4 °F (36.9 °C)  Pulse:  [] 107  Resp:  [18-33] 21  SpO2:  [100 %] 100 %  BP: ()/(53-69) 99/57     Weight: 89.8 kg (198 lb)  Body mass index is 39.99 kg/m².      Intake/Output Summary (Last 24 hours) at 12/13/2023 1425  Last data filed at 12/13/2023 0607  Gross per 24 hour   Intake 2060 ml   Output --   Net 2060 ml          Physical Exam  Vitals reviewed.   Constitutional:       General: He is not in acute distress.     Appearance: He is ill-appearing.      Interventions: He is not intubated.  HENT:      Head: Normocephalic and atraumatic.      Right Ear: External ear normal.      Left Ear: External ear normal.      Nose: Nose normal.      Mouth/Throat:      Mouth: Mucous membranes are dry.      Pharynx: Oropharynx is clear.      Comments: Trach in place  Eyes:      Extraocular Movements: Extraocular movements intact.      Conjunctiva/sclera: Conjunctivae normal.      Pupils: Pupils are equal, round, and reactive to light.   Neck:      Comments: trach  Cardiovascular:      Rate and Rhythm: Normal rate.      Heart sounds: Normal heart sounds. No murmur heard.  Pulmonary:      Effort: Pulmonary effort is normal. He is not intubated.      Breath sounds: No wheezing, rhonchi or rales.   Abdominal:      General: Abdomen is flat. Bowel sounds are normal.      Palpations: Abdomen is soft.      Comments: peg   Musculoskeletal:         General: Normal range of motion.      Cervical back: Neck supple.      Right lower leg: No edema.      Left lower leg: No edema.   Skin:     General: Skin is warm and dry.       Capillary Refill: Capillary refill takes less than 2 seconds.      Coloration: Skin is not pale.   Neurological:      Mental Status: He is alert.      Comments: Opens eyes spontaneously but does not obey any commands   Psychiatric:         Behavior: Behavior normal.           Review of Systems    Vents:  Vent Mode: AVAPS AE (12/13/23 0704)  Ventilator Initiated: Yes (11/26/23 1538)  Set Rate: 0 BPM (12/12/23 0749)  Vt Set: 450 mL (12/13/23 0704)  Pressure Support: 12 cmH20 (12/12/23 0518)  PEEP/CPAP: 0 cmH20 (12/12/23 0749)  Oxygen Concentration (%): 30 (12/13/23 1300)  Peak Airway Pressure: 15.2 cmH20 (12/12/23 0518)  Plateau Pressure: 341 cmH20 (11/18/23 0950)  Total Ve: 8.6 L/m (12/13/23 0500)  F/VT Ratio<105 (RSBI): (!) 60.4 (12/13/23 0704)    Lines/Drains/Airways       Drain  Duration                  Gastrostomy/Enterostomy midline -- days              Airway  Duration             Adult Surgical Airway Portex 8.0 mm DIC cuffed -- days                    Significant Labs:    CBC/Anemia Profile:  Recent Labs   Lab 12/12/23  0547   WBC 14.70*   HGB 9.9*   HCT 33.1*   *   MCV 93.8   RDW 18.3*          Chemistries:  Recent Labs   Lab 12/12/23  0547      K 3.6   CL 99   CO2 27   BUN 11   CREATININE 0.62*   CALCIUM 9.2         All pertinent labs within the past 24 hours have been reviewed.    Significant Imaging:  I have reviewed all pertinent imaging results/findings within the past 24 hours.

## 2023-12-13 NOTE — ASSESSMENT & PLAN NOTE
Opens eyes but does not do anything to command  Likely component of anoxic encephalopathy  He also had low blood sugar which could have contributed  11/22- no significant change. Questionable posturing  12/13- much more awake and alert. Does not obey any commands

## 2023-12-13 NOTE — ASSESSMENT & PLAN NOTE
Currently on AVAPS 6 hours bid and doing ok  Continue to wean as tolerated  Sputum culture with heavy growth MRSA so Started linezolid bid for 7 days  11/22- afebrile. Will start some trach collar today  12/13- Doing well with trach collar. Plan to try to get to 16 hours of trach collar and will rest on avaps overnight

## 2023-12-13 NOTE — PROGRESS NOTES
[facility] - [facility]  Pulmonology  Progress Note    Patient Name: Blue Abdul  MRN: [id number]  Admission Date: 11/9/2023  Hospital Length of Stay: 34 days  Code Status: Full Code  Attending Provider: Ramiro Flores MD  Primary Care Provider: Nati Doyle FNP   Principal Problem: Acute hypercapnic respiratory failure    Subjective:     Interval History: No acute events. Currently resting comfortably. More awake and alert today. Afebrile today and vital signs are stable.      Objective:     Vital Signs (Most Recent):  Temp: 98.4 °F (36.9 °C) (12/13/23 1100)  Pulse: 107 (12/13/23 1100)  Resp: (!) 21 (12/13/23 1100)  BP: (!) 99/57 (12/13/23 1100)  SpO2: 100 % (12/13/23 1150) Vital Signs (24h Range):  Temp:  [98.4 °F (36.9 °C)-99.9 °F (37.7 °C)] 98.4 °F (36.9 °C)  Pulse:  [] 107  Resp:  [18-33] 21  SpO2:  [100 %] 100 %  BP: ()/(53-69) 99/57     Weight: 89.8 kg (198 lb)  Body mass index is 39.99 kg/m².      Intake/Output Summary (Last 24 hours) at 12/13/2023 1425  Last data filed at 12/13/2023 0607  Gross per 24 hour   Intake 2060 ml   Output --   Net 2060 ml          Physical Exam  Vitals reviewed.   Constitutional:       General: He is not in acute distress.     Appearance: He is ill-appearing.      Interventions: He is not intubated.  HENT:      Head: Normocephalic and atraumatic.      Right Ear: External ear normal.      Left Ear: External ear normal.      Nose: Nose normal.      Mouth/Throat:      Mouth: Mucous membranes are dry.      Pharynx: Oropharynx is clear.      Comments: Trach in place  Eyes:      Extraocular Movements: Extraocular movements intact.      Conjunctiva/sclera: Conjunctivae normal.      Pupils: Pupils are equal, round, and reactive to light.   Neck:      Comments: trach  Cardiovascular:      Rate and Rhythm: Normal rate.      Heart sounds: Normal heart sounds. No murmur heard.  Pulmonary:      Effort: Pulmonary effort is normal. He is not  intubated.      Breath sounds: No wheezing, rhonchi or rales.   Abdominal:      General: Abdomen is flat. Bowel sounds are normal.      Palpations: Abdomen is soft.      Comments: peg   Musculoskeletal:         General: Normal range of motion.      Cervical back: Neck supple.      Right lower leg: No edema.      Left lower leg: No edema.   Skin:     General: Skin is warm and dry.      Capillary Refill: Capillary refill takes less than 2 seconds.      Coloration: Skin is not pale.   Neurological:      Mental Status: He is alert.      Comments: Opens eyes spontaneously but does not obey any commands   Psychiatric:         Behavior: Behavior normal.           Review of Systems    Vents:  Vent Mode: AVAPS AE (12/13/23 0704)  Ventilator Initiated: Yes (11/26/23 1538)  Set Rate: 0 BPM (12/12/23 0749)  Vt Set: 450 mL (12/13/23 0704)  Pressure Support: 12 cmH20 (12/12/23 0518)  PEEP/CPAP: 0 cmH20 (12/12/23 0749)  Oxygen Concentration (%): 30 (12/13/23 1300)  Peak Airway Pressure: 15.2 cmH20 (12/12/23 0518)  Plateau Pressure: 341 cmH20 (11/18/23 0950)  Total Ve: 8.6 L/m (12/13/23 0500)  F/VT Ratio<105 (RSBI): (!) 60.4 (12/13/23 0704)    Lines/Drains/Airways       Drain  Duration                  Gastrostomy/Enterostomy midline -- days              Airway  Duration             Adult Surgical Airway Portex 8.0 mm DIC cuffed -- days                    Significant Labs:    CBC/Anemia Profile:  Recent Labs   Lab 12/12/23 0547   WBC 14.70*   HGB 9.9*   HCT 33.1*   *   MCV 93.8   RDW 18.3*          Chemistries:  Recent Labs   Lab 12/12/23 0547      K 3.6   CL 99   CO2 27   BUN 11   CREATININE 0.62*   CALCIUM 9.2         All pertinent labs within the past 24 hours have been reviewed.    Significant Imaging:  I have reviewed all pertinent imaging results/findings within the past 24 hours.  Assessment/Plan:     Neuro  Encephalopathy, metabolic  Opens eyes but does not do anything to command  Likely component of anoxic  encephalopathy  He also had low blood sugar which could have contributed  11/22- no significant change. Questionable posturing  12/13- much more awake and alert. Does not obey any commands    Seizures  No recent seizure activity  Patient is on keppra 500 mg bid    Pulmonary  * Acute hypercapnic respiratory failure  Currently on AVAPS 6 hours bid and doing ok  Continue to wean as tolerated  Sputum culture with heavy growth MRSA so Started linezolid bid for 7 days  11/22- afebrile. Will start some trach collar today  12/13- Doing well with trach collar. Plan to try to get to 16 hours of trach collar and will rest on avaps overnight    Endocrine  Diabetes mellitus  Accuchecks are ok  Currently on no medications  Can monitor blood sugar and if needed add sliding scale                 Craig Estrada, DO  Pulmonology  [facility]

## 2023-12-13 NOTE — PLAN OF CARE
"O2 sat on 30% trach collar 100%.  Suctioned some cream colored "sputum" from patients trach. Patient completed 12 hour trach collar trial and will do 13 hours tomorrow.  "

## 2023-12-14 LAB — GLUCOSE SERPL-MCNC: 193 MG/DL (ref 70–105)

## 2023-12-14 PROCEDURE — 25000003 PHARM REV CODE 250: Performed by: INTERNAL MEDICINE

## 2023-12-14 PROCEDURE — 63600175 PHARM REV CODE 636 W HCPCS: Performed by: INTERNAL MEDICINE

## 2023-12-14 PROCEDURE — 99900026 HC AIRWAY MAINTENANCE (STAT)

## 2023-12-14 PROCEDURE — 94640 AIRWAY INHALATION TREATMENT: CPT

## 2023-12-14 PROCEDURE — 99900035 HC TECH TIME PER 15 MIN (STAT)

## 2023-12-14 PROCEDURE — 27000958

## 2023-12-14 PROCEDURE — 25000003 PHARM REV CODE 250: Performed by: REGISTERED NURSE

## 2023-12-14 PROCEDURE — 94003 VENT MGMT INPAT SUBQ DAY: CPT

## 2023-12-14 PROCEDURE — 25000242 PHARM REV CODE 250 ALT 637 W/ HCPCS: Performed by: INTERNAL MEDICINE

## 2023-12-14 PROCEDURE — 11000004 HC SNF PRIVATE

## 2023-12-14 PROCEDURE — 27200966 HC CLOSED SUCTION SYSTEM

## 2023-12-14 PROCEDURE — 82962 GLUCOSE BLOOD TEST: CPT

## 2023-12-14 PROCEDURE — 27000221 HC OXYGEN, UP TO 24 HOURS

## 2023-12-14 PROCEDURE — 94761 N-INVAS EAR/PLS OXIMETRY MLT: CPT

## 2023-12-14 PROCEDURE — 27000935

## 2023-12-14 RX ADMIN — APIXABAN 5 MG: 2.5 TABLET, FILM COATED ORAL at 08:12

## 2023-12-14 RX ADMIN — POTASSIUM IODIDE 5 DROP: 1 SOLUTION ORAL at 12:12

## 2023-12-14 RX ADMIN — LEVETIRACETAM 500 MG: 500 SOLUTION ORAL at 08:12

## 2023-12-14 RX ADMIN — POTASSIUM IODIDE 5 DROP: 1 SOLUTION ORAL at 08:12

## 2023-12-14 RX ADMIN — PANTOPRAZOLE SODIUM 40 MG: 40 GRANULE, DELAYED RELEASE ORAL at 08:12

## 2023-12-14 RX ADMIN — IPRATROPIUM BROMIDE AND ALBUTEROL SULFATE 3 ML: 2.5; .5 SOLUTION RESPIRATORY (INHALATION) at 07:12

## 2023-12-14 RX ADMIN — BUDESONIDE 0.5 MG: 0.5 INHALANT ORAL at 07:12

## 2023-12-14 RX ADMIN — POTASSIUM IODIDE 5 DROP: 1 SOLUTION ORAL at 04:12

## 2023-12-14 RX ADMIN — PREDNISONE 20 MG: 20 TABLET ORAL at 08:12

## 2023-12-14 NOTE — PLAN OF CARE
Problem: Adult Inpatient Plan of Care  Goal: Plan of Care Review  Outcome: Ongoing, Progressing     Problem: Adult Inpatient Plan of Care  Goal: Absence of Hospital-Acquired Illness or Injury  Outcome: Ongoing, Progressing     Problem: Adult Inpatient Plan of Care  Goal: Optimal Comfort and Wellbeing  Outcome: Ongoing, Progressing     Problem: Diabetes Comorbidity  Goal: Blood Glucose Level Within Targeted Range  Outcome: Ongoing, Progressing     Problem: Bariatric Environmental Safety  Goal: Safety Maintained with Care  Outcome: Ongoing, Progressing

## 2023-12-14 NOTE — PLAN OF CARE
Care plan reviewed  Problem: Adult Inpatient Plan of Care  Goal: Optimal Comfort and Wellbeing  Outcome: Ongoing, Progressing     Problem: Adult Inpatient Plan of Care  Goal: Plan of Care Review  Outcome: Ongoing, Progressing     Problem: Adult Inpatient Plan of Care  Goal: Patient-Specific Goal (Individualized)  Outcome: Ongoing, Progressing     Problem: Adult Inpatient Plan of Care  Goal: Absence of Hospital-Acquired Illness or Injury  Outcome: Ongoing, Progressing     Problem: Adult Inpatient Plan of Care  Goal: Readiness for Transition of Care  Outcome: Ongoing, Progressing

## 2023-12-14 NOTE — PLAN OF CARE
Patient completed 13 hours on trach collar tonight. He will do 14 hours tomorrow. Patient was placed back on AVAPS for the night. O2 sat was 100% on 30% trach collar.

## 2023-12-15 LAB — GLUCOSE SERPL-MCNC: 177 MG/DL (ref 70–105)

## 2023-12-15 PROCEDURE — 27000716 HC OXISENSOR PROBE, ANY SIZE

## 2023-12-15 PROCEDURE — 11000004 HC SNF PRIVATE

## 2023-12-15 PROCEDURE — 25000242 PHARM REV CODE 250 ALT 637 W/ HCPCS: Performed by: INTERNAL MEDICINE

## 2023-12-15 PROCEDURE — 94761 N-INVAS EAR/PLS OXIMETRY MLT: CPT

## 2023-12-15 PROCEDURE — 27000221 HC OXYGEN, UP TO 24 HOURS

## 2023-12-15 PROCEDURE — 25000003 PHARM REV CODE 250: Performed by: INTERNAL MEDICINE

## 2023-12-15 PROCEDURE — 99900035 HC TECH TIME PER 15 MIN (STAT)

## 2023-12-15 PROCEDURE — 99900026 HC AIRWAY MAINTENANCE (STAT)

## 2023-12-15 PROCEDURE — 27000935

## 2023-12-15 PROCEDURE — 25000003 PHARM REV CODE 250: Performed by: REGISTERED NURSE

## 2023-12-15 PROCEDURE — 82962 GLUCOSE BLOOD TEST: CPT

## 2023-12-15 PROCEDURE — 27000958

## 2023-12-15 PROCEDURE — 94003 VENT MGMT INPAT SUBQ DAY: CPT

## 2023-12-15 PROCEDURE — 63600175 PHARM REV CODE 636 W HCPCS: Performed by: INTERNAL MEDICINE

## 2023-12-15 PROCEDURE — 94640 AIRWAY INHALATION TREATMENT: CPT

## 2023-12-15 RX ADMIN — PANTOPRAZOLE SODIUM 40 MG: 40 GRANULE, DELAYED RELEASE ORAL at 09:12

## 2023-12-15 RX ADMIN — LEVETIRACETAM 500 MG: 500 SOLUTION ORAL at 09:12

## 2023-12-15 RX ADMIN — POTASSIUM IODIDE 5 DROP: 1 SOLUTION ORAL at 01:12

## 2023-12-15 RX ADMIN — APIXABAN 5 MG: 2.5 TABLET, FILM COATED ORAL at 08:12

## 2023-12-15 RX ADMIN — PANTOPRAZOLE SODIUM 40 MG: 40 GRANULE, DELAYED RELEASE ORAL at 08:12

## 2023-12-15 RX ADMIN — PREDNISONE 20 MG: 20 TABLET ORAL at 09:12

## 2023-12-15 RX ADMIN — DICLOFENAC 2 G: 10 GEL TOPICAL at 09:12

## 2023-12-15 RX ADMIN — BUDESONIDE 0.5 MG: 0.5 INHALANT ORAL at 07:12

## 2023-12-15 RX ADMIN — IPRATROPIUM BROMIDE AND ALBUTEROL SULFATE 3 ML: 2.5; .5 SOLUTION RESPIRATORY (INHALATION) at 07:12

## 2023-12-15 RX ADMIN — APIXABAN 5 MG: 2.5 TABLET, FILM COATED ORAL at 09:12

## 2023-12-15 RX ADMIN — POTASSIUM IODIDE 5 DROP: 1 SOLUTION ORAL at 09:12

## 2023-12-15 RX ADMIN — LEVETIRACETAM 500 MG: 500 SOLUTION ORAL at 08:12

## 2023-12-15 RX ADMIN — POTASSIUM IODIDE 5 DROP: 1 SOLUTION ORAL at 08:12

## 2023-12-15 RX ADMIN — POTASSIUM IODIDE 5 DROP: 1 SOLUTION ORAL at 04:12

## 2023-12-15 NOTE — PLAN OF CARE
Problem: Device-Related Complication Risk (Mechanical Ventilation, Invasive)  Goal: Optimal Device Function  Outcome: Ongoing, Progressing     Problem: Inability to Wean (Mechanical Ventilation, Invasive)  Goal: Mechanical Ventilation Liberation  Outcome: Ongoing, Progressing     Problem: Skin and Tissue Injury (Artificial Airway)  Goal: Absence of Device-Related Skin or Tissue Injury  Outcome: Ongoing, Progressing     Problem: Fall Injury Risk  Goal: Absence of Fall and Fall-Related Injury  Outcome: Ongoing, Progressing

## 2023-12-15 NOTE — PLAN OF CARE
Problem: Adult Inpatient Plan of Care  Goal: Plan of Care Review  Outcome: Ongoing, Progressing     Problem: Adult Inpatient Plan of Care  Goal: Patient-Specific Goal (Individualized)  Outcome: Ongoing, Progressing     Problem: Adult Inpatient Plan of Care  Goal: Absence of Hospital-Acquired Illness or Injury  Outcome: Ongoing, Progressing     Problem: Adult Inpatient Plan of Care  Goal: Optimal Comfort and Wellbeing  Outcome: Ongoing, Progressing     Problem: Nutrition Impairment (Mechanical Ventilation, Invasive)  Goal: Optimal Nutrition Delivery  Outcome: Ongoing, Progressing

## 2023-12-15 NOTE — PLAN OF CARE
Patient is currently doing 14 hours trach collar trial. FIO2 was decreased to 28% today and patient is maintaining an O2 sat of 97%.

## 2023-12-15 NOTE — PROGRESS NOTES
Wt: 197#  No further problems with vomiting.  No new lab.  Pt continues to do TC trials.  Glu 177, last BM 12/15.  Continue POC.

## 2023-12-15 NOTE — PLAN OF CARE
Problem: Adult Inpatient Plan of Care  Goal: Plan of Care Review  Outcome: Ongoing, Progressing  Goal: Patient-Specific Goal (Individualized)  Outcome: Ongoing, Progressing  Goal: Absence of Hospital-Acquired Illness or Injury  Outcome: Ongoing, Progressing  Goal: Optimal Comfort and Wellbeing  Outcome: Ongoing, Progressing     Problem: Communication Impairment (Mechanical Ventilation, Invasive)  Goal: Effective Communication  Outcome: Ongoing, Progressing     Problem: Device-Related Complication Risk (Mechanical Ventilation, Invasive)  Goal: Optimal Device Function  Outcome: Ongoing, Progressing     Problem: Inability to Wean (Mechanical Ventilation, Invasive)  Goal: Mechanical Ventilation Liberation  Outcome: Ongoing, Progressing     Problem: Nutrition Impairment (Mechanical Ventilation, Invasive)  Goal: Optimal Nutrition Delivery  Outcome: Ongoing, Progressing     Problem: Skin and Tissue Injury (Mechanical Ventilation, Invasive)  Goal: Absence of Device-Related Skin and Tissue Injury  Outcome: Ongoing, Progressing     Problem: Ventilator-Induced Lung Injury (Mechanical Ventilation, Invasive)  Goal: Absence of Ventilator-Induced Lung Injury  Outcome: Ongoing, Progressing     Problem: Communication Impairment (Artificial Airway)  Goal: Effective Communication  Outcome: Ongoing, Progressing     Problem: Skin and Tissue Injury (Artificial Airway)  Goal: Absence of Device-Related Skin or Tissue Injury  Outcome: Ongoing, Progressing     Problem: Skin Injury Risk Increased  Goal: Skin Health and Integrity  Outcome: Ongoing, Progressing     Problem: Impaired Wound Healing  Goal: Optimal Wound Healing  Outcome: Ongoing, Progressing     Problem: Gas Exchange Impaired  Goal: Optimal Gas Exchange  Outcome: Ongoing, Progressing

## 2023-12-16 PROCEDURE — 27000941

## 2023-12-16 PROCEDURE — 27000221 HC OXYGEN, UP TO 24 HOURS

## 2023-12-16 PROCEDURE — 99900026 HC AIRWAY MAINTENANCE (STAT)

## 2023-12-16 PROCEDURE — 94761 N-INVAS EAR/PLS OXIMETRY MLT: CPT

## 2023-12-16 PROCEDURE — 27000935

## 2023-12-16 PROCEDURE — 25000003 PHARM REV CODE 250: Performed by: REGISTERED NURSE

## 2023-12-16 PROCEDURE — 99900035 HC TECH TIME PER 15 MIN (STAT)

## 2023-12-16 PROCEDURE — 11000004 HC SNF PRIVATE

## 2023-12-16 PROCEDURE — 63600175 PHARM REV CODE 636 W HCPCS: Performed by: INTERNAL MEDICINE

## 2023-12-16 PROCEDURE — 27200966 HC CLOSED SUCTION SYSTEM

## 2023-12-16 PROCEDURE — 25000242 PHARM REV CODE 250 ALT 637 W/ HCPCS: Performed by: INTERNAL MEDICINE

## 2023-12-16 PROCEDURE — 94003 VENT MGMT INPAT SUBQ DAY: CPT

## 2023-12-16 PROCEDURE — 25000003 PHARM REV CODE 250: Performed by: INTERNAL MEDICINE

## 2023-12-16 PROCEDURE — 94640 AIRWAY INHALATION TREATMENT: CPT

## 2023-12-16 RX ADMIN — LEVETIRACETAM 500 MG: 500 SOLUTION ORAL at 09:12

## 2023-12-16 RX ADMIN — APIXABAN 5 MG: 2.5 TABLET, FILM COATED ORAL at 09:12

## 2023-12-16 RX ADMIN — POTASSIUM IODIDE 5 DROP: 1 SOLUTION ORAL at 05:12

## 2023-12-16 RX ADMIN — BUDESONIDE 0.5 MG: 0.5 INHALANT ORAL at 07:12

## 2023-12-16 RX ADMIN — APIXABAN 5 MG: 2.5 TABLET, FILM COATED ORAL at 08:12

## 2023-12-16 RX ADMIN — POTASSIUM IODIDE 5 DROP: 1 SOLUTION ORAL at 08:12

## 2023-12-16 RX ADMIN — IPRATROPIUM BROMIDE AND ALBUTEROL SULFATE 3 ML: 2.5; .5 SOLUTION RESPIRATORY (INHALATION) at 07:12

## 2023-12-16 RX ADMIN — PANTOPRAZOLE SODIUM 40 MG: 40 GRANULE, DELAYED RELEASE ORAL at 09:12

## 2023-12-16 RX ADMIN — PANTOPRAZOLE SODIUM 40 MG: 40 GRANULE, DELAYED RELEASE ORAL at 08:12

## 2023-12-16 RX ADMIN — POTASSIUM IODIDE 5 DROP: 1 SOLUTION ORAL at 01:12

## 2023-12-16 RX ADMIN — LEVETIRACETAM 500 MG: 500 SOLUTION ORAL at 08:12

## 2023-12-16 RX ADMIN — POTASSIUM IODIDE 5 DROP: 1 SOLUTION ORAL at 09:12

## 2023-12-16 RX ADMIN — PREDNISONE 20 MG: 20 TABLET ORAL at 08:12

## 2023-12-16 NOTE — CARE UPDATE
0705    Placed pt on TC X 16hrs today    Nurse notified    Will continue to monitor pt    02-94-97%

## 2023-12-16 NOTE — PROGRESS NOTES
Placed patient back on the vent via AVAP-AE with previous settings for rest.  Heart Rate 100, Respiratory rate 24, O2 sat 96% on 28%, Blood Pressure 119/60.  Patient did 15 hours of Trach Collar.  Patient was on Trach Collar 28% all day but I had to increase his FIO2 to 35% @ 1936 to maintain his sats.  Patient's sats were @ 90% on Trach Collar 28%.  O2 sats increased to 92% on Trach Collar 35%.  Overall patient did well on Trach Collar trial.

## 2023-12-16 NOTE — PLAN OF CARE
Problem: Communication Impairment (Mechanical Ventilation, Invasive)  Goal: Effective Communication  Outcome: Ongoing, Progressing     Problem: Device-Related Complication Risk (Mechanical Ventilation, Invasive)  Goal: Optimal Device Function  Outcome: Ongoing, Progressing     Problem: Inability to Wean (Mechanical Ventilation, Invasive)  Goal: Mechanical Ventilation Liberation  Outcome: Ongoing, Progressing     Problem: Communication Impairment (Artificial Airway)  Goal: Effective Communication  Outcome: Ongoing, Progressing     Problem: Noninvasive Ventilation Acute  Goal: Effective Unassisted Ventilation and Oxygenation  Outcome: Ongoing, Progressing

## 2023-12-17 LAB — GLUCOSE SERPL-MCNC: 179 MG/DL (ref 70–105)

## 2023-12-17 PROCEDURE — 63600175 PHARM REV CODE 636 W HCPCS: Performed by: INTERNAL MEDICINE

## 2023-12-17 PROCEDURE — 27000935

## 2023-12-17 PROCEDURE — 94003 VENT MGMT INPAT SUBQ DAY: CPT

## 2023-12-17 PROCEDURE — 25000003 PHARM REV CODE 250: Performed by: INTERNAL MEDICINE

## 2023-12-17 PROCEDURE — 25000242 PHARM REV CODE 250 ALT 637 W/ HCPCS: Performed by: INTERNAL MEDICINE

## 2023-12-17 PROCEDURE — 11000004 HC SNF PRIVATE

## 2023-12-17 PROCEDURE — 27000221 HC OXYGEN, UP TO 24 HOURS

## 2023-12-17 PROCEDURE — 94761 N-INVAS EAR/PLS OXIMETRY MLT: CPT

## 2023-12-17 PROCEDURE — 82962 GLUCOSE BLOOD TEST: CPT

## 2023-12-17 PROCEDURE — 99900026 HC AIRWAY MAINTENANCE (STAT)

## 2023-12-17 PROCEDURE — 25000003 PHARM REV CODE 250: Performed by: REGISTERED NURSE

## 2023-12-17 PROCEDURE — 27200966 HC CLOSED SUCTION SYSTEM

## 2023-12-17 PROCEDURE — 99900035 HC TECH TIME PER 15 MIN (STAT)

## 2023-12-17 PROCEDURE — 94640 AIRWAY INHALATION TREATMENT: CPT

## 2023-12-17 PROCEDURE — 27000941

## 2023-12-17 RX ADMIN — POTASSIUM IODIDE 5 DROP: 1 SOLUTION ORAL at 01:12

## 2023-12-17 RX ADMIN — PANTOPRAZOLE SODIUM 40 MG: 40 GRANULE, DELAYED RELEASE ORAL at 08:12

## 2023-12-17 RX ADMIN — LEVETIRACETAM 500 MG: 500 SOLUTION ORAL at 09:12

## 2023-12-17 RX ADMIN — APIXABAN 5 MG: 2.5 TABLET, FILM COATED ORAL at 08:12

## 2023-12-17 RX ADMIN — APIXABAN 5 MG: 2.5 TABLET, FILM COATED ORAL at 09:12

## 2023-12-17 RX ADMIN — PANTOPRAZOLE SODIUM 40 MG: 40 GRANULE, DELAYED RELEASE ORAL at 09:12

## 2023-12-17 RX ADMIN — LEVETIRACETAM 500 MG: 500 SOLUTION ORAL at 08:12

## 2023-12-17 RX ADMIN — BUDESONIDE 0.5 MG: 0.5 INHALANT ORAL at 07:12

## 2023-12-17 RX ADMIN — POTASSIUM IODIDE 5 DROP: 1 SOLUTION ORAL at 08:12

## 2023-12-17 RX ADMIN — PREDNISONE 20 MG: 20 TABLET ORAL at 09:12

## 2023-12-17 RX ADMIN — IPRATROPIUM BROMIDE AND ALBUTEROL SULFATE 3 ML: 2.5; .5 SOLUTION RESPIRATORY (INHALATION) at 07:12

## 2023-12-17 RX ADMIN — POTASSIUM IODIDE 5 DROP: 1 SOLUTION ORAL at 05:12

## 2023-12-17 RX ADMIN — POTASSIUM IODIDE 5 DROP: 1 SOLUTION ORAL at 09:12

## 2023-12-18 LAB — GLUCOSE SERPL-MCNC: 169 MG/DL (ref 70–105)

## 2023-12-18 PROCEDURE — 63600175 PHARM REV CODE 636 W HCPCS: Performed by: INTERNAL MEDICINE

## 2023-12-18 PROCEDURE — 25000003 PHARM REV CODE 250: Performed by: REGISTERED NURSE

## 2023-12-18 PROCEDURE — 27000941

## 2023-12-18 PROCEDURE — 99900026 HC AIRWAY MAINTENANCE (STAT)

## 2023-12-18 PROCEDURE — 99900035 HC TECH TIME PER 15 MIN (STAT)

## 2023-12-18 PROCEDURE — 87186 SC STD MICRODIL/AGAR DIL: CPT | Performed by: INTERNAL MEDICINE

## 2023-12-18 PROCEDURE — 11000004 HC SNF PRIVATE

## 2023-12-18 PROCEDURE — 27000935

## 2023-12-18 PROCEDURE — 25000003 PHARM REV CODE 250: Performed by: INTERNAL MEDICINE

## 2023-12-18 PROCEDURE — 27000221 HC OXYGEN, UP TO 24 HOURS

## 2023-12-18 PROCEDURE — 94761 N-INVAS EAR/PLS OXIMETRY MLT: CPT

## 2023-12-18 PROCEDURE — 25000242 PHARM REV CODE 250 ALT 637 W/ HCPCS: Performed by: INTERNAL MEDICINE

## 2023-12-18 PROCEDURE — 82962 GLUCOSE BLOOD TEST: CPT

## 2023-12-18 PROCEDURE — 87040 BLOOD CULTURE FOR BACTERIA: CPT | Performed by: INTERNAL MEDICINE

## 2023-12-18 PROCEDURE — 94640 AIRWAY INHALATION TREATMENT: CPT

## 2023-12-18 PROCEDURE — 94003 VENT MGMT INPAT SUBQ DAY: CPT

## 2023-12-18 RX ADMIN — APIXABAN 5 MG: 2.5 TABLET, FILM COATED ORAL at 08:12

## 2023-12-18 RX ADMIN — PANTOPRAZOLE SODIUM 40 MG: 40 GRANULE, DELAYED RELEASE ORAL at 08:12

## 2023-12-18 RX ADMIN — LEVETIRACETAM 500 MG: 500 SOLUTION ORAL at 08:12

## 2023-12-18 RX ADMIN — POTASSIUM IODIDE 5 DROP: 1 SOLUTION ORAL at 08:12

## 2023-12-18 RX ADMIN — POTASSIUM IODIDE 5 DROP: 1 SOLUTION ORAL at 01:12

## 2023-12-18 RX ADMIN — IPRATROPIUM BROMIDE AND ALBUTEROL SULFATE 3 ML: 2.5; .5 SOLUTION RESPIRATORY (INHALATION) at 07:12

## 2023-12-18 RX ADMIN — PREDNISONE 20 MG: 20 TABLET ORAL at 08:12

## 2023-12-18 RX ADMIN — BUDESONIDE 0.5 MG: 0.5 INHALANT ORAL at 07:12

## 2023-12-18 RX ADMIN — POTASSIUM IODIDE 5 DROP: 1 SOLUTION ORAL at 05:12

## 2023-12-18 NOTE — CARE UPDATE
Placed pt on Trach collar at 30%. Pts goal today is 16 hours on trach collar and AVAPS at night to rest. Pt tolerated transition fine with no problems. Suctioned pt after placing on trach collar and got back dark green, thick, foul smelling sputum. Dr. Blanco notified and verbal order for sputum culture was obtained.

## 2023-12-18 NOTE — PLAN OF CARE
[facility] - Medical Surgical Unit  Discharge Reassessment    Primary Care Provider: Nati Doyle FNP    Expected Discharge Date:     Reassessment (most recent)       Discharge Reassessment - 12/18/23 1258          Discharge Reassessment    Assessment Type Discharge Planning Brief Assessment     Did the patient's condition or plan change since previous assessment? No     Discharge Plan discussed with: Parent(s);Caregiver     Name(s) and Number(s) Linn     Communicated CRICKET with patient/caregiver Date not available/Unable to determine     Discharge Plan A Home with family;Home Health     DME Needed Upon Discharge  feeding device;hospital bed;lift device;nutrition supplies;oxygen;respiratory supplies;suction machine;ventilator     Transition of Care Barriers None     Why the patient remains in the hospital Requires continued medical care        Post-Acute Status    Post-Acute Authorization Home Health     Discharge Delays None known at this time                     Linn mother is planning to take pt home at the end of skilled care , will assists with all DME and home health referrals and discharge needs

## 2023-12-19 LAB
ANION GAP SERPL CALCULATED.3IONS-SCNC: 14 MMOL/L (ref 7–16)
BASOPHILS # BLD AUTO: 0.03 K/UL (ref 0–0.2)
BASOPHILS NFR BLD AUTO: 0.3 % (ref 0–1)
BUN SERPL-MCNC: 14 MG/DL (ref 7–18)
BUN/CREAT SERPL: 23 (ref 6–20)
CALCIUM SERPL-MCNC: 9.2 MG/DL (ref 8.5–10.1)
CHLORIDE SERPL-SCNC: 99 MMOL/L (ref 98–107)
CO2 SERPL-SCNC: 23 MMOL/L (ref 21–32)
CREAT SERPL-MCNC: 0.6 MG/DL (ref 0.7–1.3)
DIFFERENTIAL METHOD BLD: ABNORMAL
EGFR (NO RACE VARIABLE) (RUSH/TITUS): 132 ML/MIN/1.73M2
EOSINOPHIL # BLD AUTO: 0.09 K/UL (ref 0–0.5)
EOSINOPHIL NFR BLD AUTO: 0.9 % (ref 1–4)
ERYTHROCYTE [DISTWIDTH] IN BLOOD BY AUTOMATED COUNT: 18.3 % (ref 11.5–14.5)
GLUCOSE SERPL-MCNC: 159 MG/DL (ref 74–106)
HCT VFR BLD AUTO: 37.6 % (ref 40–54)
HGB BLD-MCNC: 11.6 G/DL (ref 13.5–18)
LYMPHOCYTES # BLD AUTO: 2.52 K/UL (ref 1–4.8)
LYMPHOCYTES NFR BLD AUTO: 24 % (ref 27–41)
LYMPHOCYTES NFR BLD MANUAL: 26 % (ref 27–41)
MCH RBC QN AUTO: 28.2 PG (ref 27–31)
MCHC RBC AUTO-ENTMCNC: 30.9 G/DL (ref 32–36)
MCV RBC AUTO: 91.5 FL (ref 80–96)
MONOCYTES # BLD AUTO: 0.56 K/UL (ref 0–0.8)
MONOCYTES NFR BLD AUTO: 5.3 % (ref 2–6)
MONOCYTES NFR BLD MANUAL: 5 % (ref 2–6)
MPC BLD CALC-MCNC: 10.4 FL (ref 9.4–12.4)
NEUTROPHILS # BLD AUTO: 7.28 K/UL (ref 1.8–7.7)
NEUTROPHILS NFR BLD AUTO: 69.5 % (ref 53–65)
NEUTS SEG NFR BLD MANUAL: 69 % (ref 50–62)
NRBC BLD MANUAL-RTO: ABNORMAL %
PLATELET # BLD AUTO: 548 K/UL (ref 150–400)
POTASSIUM SERPL-SCNC: 4.2 MMOL/L (ref 3.5–5.1)
RBC # BLD AUTO: 4.11 M/UL (ref 4.6–6.2)
SODIUM SERPL-SCNC: 132 MMOL/L (ref 136–145)
WBC # BLD AUTO: 10.48 K/UL (ref 4.5–11)

## 2023-12-19 PROCEDURE — 80048 BASIC METABOLIC PNL TOTAL CA: CPT | Performed by: INTERNAL MEDICINE

## 2023-12-19 PROCEDURE — 99309 SBSQ NF CARE MODERATE MDM 30: CPT | Mod: ,,, | Performed by: INTERNAL MEDICINE

## 2023-12-19 PROCEDURE — 94761 N-INVAS EAR/PLS OXIMETRY MLT: CPT

## 2023-12-19 PROCEDURE — 27000935

## 2023-12-19 PROCEDURE — 25000242 PHARM REV CODE 250 ALT 637 W/ HCPCS: Performed by: INTERNAL MEDICINE

## 2023-12-19 PROCEDURE — 94640 AIRWAY INHALATION TREATMENT: CPT

## 2023-12-19 PROCEDURE — 85025 COMPLETE CBC W/AUTO DIFF WBC: CPT | Performed by: INTERNAL MEDICINE

## 2023-12-19 PROCEDURE — 63600175 PHARM REV CODE 636 W HCPCS: Performed by: INTERNAL MEDICINE

## 2023-12-19 PROCEDURE — 27000221 HC OXYGEN, UP TO 24 HOURS

## 2023-12-19 PROCEDURE — 25000003 PHARM REV CODE 250: Performed by: INTERNAL MEDICINE

## 2023-12-19 PROCEDURE — 27000958

## 2023-12-19 PROCEDURE — 11000004 HC SNF PRIVATE

## 2023-12-19 PROCEDURE — 99900026 HC AIRWAY MAINTENANCE (STAT)

## 2023-12-19 PROCEDURE — 99900035 HC TECH TIME PER 15 MIN (STAT)

## 2023-12-19 PROCEDURE — 27000716 HC OXISENSOR PROBE, ANY SIZE

## 2023-12-19 PROCEDURE — 27200966 HC CLOSED SUCTION SYSTEM

## 2023-12-19 PROCEDURE — 94003 VENT MGMT INPAT SUBQ DAY: CPT

## 2023-12-19 PROCEDURE — 25000003 PHARM REV CODE 250: Performed by: REGISTERED NURSE

## 2023-12-19 RX ORDER — LINEZOLID 600 MG/1
600 TABLET, FILM COATED ORAL EVERY 12 HOURS
Status: DISCONTINUED | OUTPATIENT
Start: 2023-12-19 | End: 2023-12-25

## 2023-12-19 RX ORDER — LINEZOLID 2 MG/ML
600 INJECTION, SOLUTION INTRAVENOUS
Status: DISCONTINUED | OUTPATIENT
Start: 2023-12-19 | End: 2023-12-19

## 2023-12-19 RX ADMIN — POTASSIUM IODIDE 5 DROP: 1 SOLUTION ORAL at 08:12

## 2023-12-19 RX ADMIN — IPRATROPIUM BROMIDE AND ALBUTEROL SULFATE 3 ML: 2.5; .5 SOLUTION RESPIRATORY (INHALATION) at 07:12

## 2023-12-19 RX ADMIN — LEVETIRACETAM 500 MG: 500 SOLUTION ORAL at 08:12

## 2023-12-19 RX ADMIN — APIXABAN 5 MG: 2.5 TABLET, FILM COATED ORAL at 08:12

## 2023-12-19 RX ADMIN — PREDNISONE 20 MG: 20 TABLET ORAL at 08:12

## 2023-12-19 RX ADMIN — POTASSIUM IODIDE 5 DROP: 1 SOLUTION ORAL at 01:12

## 2023-12-19 RX ADMIN — BUDESONIDE 0.5 MG: 0.5 INHALANT ORAL at 07:12

## 2023-12-19 RX ADMIN — PANTOPRAZOLE SODIUM 40 MG: 40 GRANULE, DELAYED RELEASE ORAL at 08:12

## 2023-12-19 RX ADMIN — POTASSIUM IODIDE 5 DROP: 1 SOLUTION ORAL at 05:12

## 2023-12-19 RX ADMIN — LINEZOLID 600 MG: 600 TABLET, FILM COATED ORAL at 08:12

## 2023-12-19 NOTE — PLAN OF CARE
Problem: Adult Inpatient Plan of Care  Goal: Plan of Care Review  Outcome: Ongoing, Progressing     Problem: Adult Inpatient Plan of Care  Goal: Absence of Hospital-Acquired Illness or Injury  Outcome: Ongoing, Progressing     Problem: Adult Inpatient Plan of Care  Goal: Optimal Comfort and Wellbeing  Outcome: Ongoing, Progressing     Problem: Device-Related Complication Risk (Artificial Airway)  Goal: Optimal Device Function  Outcome: Ongoing, Progressing     Problem: Diabetes Comorbidity  Goal: Blood Glucose Level Within Targeted Range  Outcome: Ongoing, Progressing

## 2023-12-19 NOTE — PROGRESS NOTES
Wt: 195#  No problems noted tolerating TF.  Pt is doing 16 hours of TC during the day.  Last BM .  Lab reviewed.  B-169.  Crt 0.60 (pt is receiving 1.0g per kg PRO ABW, 1.7g per kg AdjBW).  Pt's trach size is #8.  Continue NS.

## 2023-12-19 NOTE — CARE UPDATE
Placed pt on trach collar at 30%. Pts goal is 16 hours. Pt tolerated transition well and is stable at this time.

## 2023-12-19 NOTE — PLAN OF CARE
[facility] - [facility] Surgical Unit - Swing Bed   Interdisciplinary Team Meeting    Patient: Blue Abdul   Today's Date: 12/19/2023   Estimated D/C Date:         Physician: Ramiro Flores MD Nurse Practitioner:    Pharmacy:  [facility] Unit Director: Holly Carrillo   :  Yamilet Vance Physical/Occupational Therapy: Jadon Mark   Speech Therapy:  Monica Anderson Activity Therapy:    Nursing: Holly Carrillo  Respiratory: Tona Beckwith Dietary: Kuldeep Boggs  Other:      Nurse  New Symptoms/Problems:   Last Bowel Movement: 12/19/23   Urine: incontinent  Rojas: No  Bowel: incontinent   Constipated: No  Diarrhea: No   Isolation: Yes  Wound Care: No  Wound Location/Tx: n/a  Cognition: patient minimally responsive  Aspiration Precautions: Yes  Comment(s):     Respiratory   O2 Device: Trach Collar  O2 Flow: 30% O2  SpO2: 100%  Neb Tx: No  Comment(s):      Dietary  Nutrition: NPO  Comment(s): TF of Peptamin AF at 50cc/hr    Speech Therapy  Speech/Swallowing: No current speech or swallowing issues  Comment(s): still on AVAPS    Physical Therapy  Gait/Assistive Device: unable to ambulate ELOS: Plan to DC     Transfers: Total Assistance  Bed Mobility: Total Assistance Range of Motion/Restrictions:   Comment(s):      Occupational Therapy  Eating/Grooming: Total Assistance Toileting: Total Assistance   Bathing: Total Assistance Dressing (Upper Body): Total Assistance   Dressing (Lower Body): Total Assistance Comment(s):      Pharmacy  Medication Changes (see all MD orders in chart): Yes  Labs Reviewed: Yes  New Lab Orders: Yes  Comment(s):       Tx Plan/Recommendations reviewed with family and/or patient on (date) 12/19/23.  Additional family Conference/Training:   D/C Plan/Recommendations: Home with family  CRICKET:   Comment(s): Plan is d/c home with Mother and will need DME and HH services

## 2023-12-19 NOTE — ASSESSMENT & PLAN NOTE
Currently on AVAPS 6 hours bid and doing ok  Continue to wean as tolerated  Sputum culture with heavy growth MRSA so Started linezolid bid for 7 days  11/22- afebrile. Will start some trach collar today  12/13- Doing well with trach collar. Plan to try to get to 16 hours of trach collar and will rest on avaps overnight  12/19- more purulent sputum production. Prelim culture with staph. I am going to start linezolid for 7 days. F/U culture and shape therapy accordingly  Doing well with trials. Going to increase as tolerated

## 2023-12-19 NOTE — CARE UPDATE
Pt is on AVAPS 30% o2 at night and Trach collar 30% o2 16 hours during the day. Pt tolerates transition well. Pts secretions are thick, creamy, yellow, and foul smelling. Pt get Duoneb and pulmicort neb treatments every 12 hours. Pt is stable at this time.

## 2023-12-19 NOTE — CARE UPDATE
12/18/23 1912   Patient Assessment/Suction   Level of Consciousness (AVPU) responds to voice   Respiratory Effort Normal;Unlabored   Expansion/Accessory Muscles/Retractions no retractions;no use of accessory muscles;expansion symmetric   All Lung Fields Breath Sounds Anterior:;Lateral:;diminished;clear;equal bilaterally   Rhythm/Pattern, Respiratory shallow;pattern regular;unlabored   Cough Frequency no cough   Suction Method oral;tracheal   $ Suction Charges NT Suction Procedure   Secretions Amount scant   Secretions Color white;clear   Secretions Characteristics tenacious   PRE-TX-O2   Device (Oxygen Therapy) tracheostomy collar   Oxygen Concentration (%) 30   SpO2 100 %   Pulse Oximetry Type Continuous   $ Pulse Oximetry - Multiple Charge Pulse Oximetry - Multiple   SpO2 Alarm Limit Low 88   Probe Placed On (Pulse Ox) forehead   Oximetry Probe Status Applied   Pulse 88   Resp 20   Positioning HOB elevated 45 degrees   Aerosol Therapy   $ Aerosol Therapy Charges Aerosol Treatment   Daily Review of Necessity (SVN) completed   Respiratory Treatment Status (SVN) given  (0.5 Pulmicort)   Treatment Route (SVN) in-line;oxygen   Patient Position (SVN) HOB elevated   Post Treatment Assessment (SVN) congestion decreased;increased aeration   Signs of Intolerance (SVN) none   Ready to Wean/Extubation Screen   FIO2<=50 (chart decimal) 0.3

## 2023-12-19 NOTE — SUBJECTIVE & OBJECTIVE
Interval History: No acute events. Currently resting comfortably. More awake and alert today. Afebrile today and vital signs are stable.      Objective:     Vital Signs (Most Recent):  Temp: 99.5 °F (37.5 °C) (12/19/23 1100)  Pulse: 105 (12/19/23 1100)  Resp: (!) 75 (12/19/23 1100)  BP: (!) 90/44 (12/19/23 1100)  SpO2: 100 % (12/19/23 1100) Vital Signs (24h Range):  Temp:  [98.6 °F (37 °C)-99.5 °F (37.5 °C)] 99.5 °F (37.5 °C)  Pulse:  [] 105  Resp:  [17-75] 75  SpO2:  [98 %-100 %] 100 %  BP: ()/(44-77) 90/44     Weight: 88.8 kg (195 lb 12 oz)  Body mass index is 39.54 kg/m².      Intake/Output Summary (Last 24 hours) at 12/19/2023 1626  Last data filed at 12/18/2023 1814  Gross per 24 hour   Intake 1140 ml   Output --   Net 1140 ml          Physical Exam  Vitals reviewed.   Constitutional:       General: He is not in acute distress.     Appearance: He is ill-appearing.      Interventions: He is not intubated.  HENT:      Head: Normocephalic and atraumatic.      Right Ear: External ear normal.      Left Ear: External ear normal.      Nose: Nose normal.      Mouth/Throat:      Mouth: Mucous membranes are dry.      Pharynx: Oropharynx is clear.      Comments: Trach in place  Eyes:      Extraocular Movements: Extraocular movements intact.      Conjunctiva/sclera: Conjunctivae normal.      Pupils: Pupils are equal, round, and reactive to light.   Neck:      Comments: trach  Cardiovascular:      Rate and Rhythm: Normal rate.      Heart sounds: Normal heart sounds. No murmur heard.  Pulmonary:      Effort: Pulmonary effort is normal. He is not intubated.      Breath sounds: No wheezing, rhonchi or rales.   Abdominal:      General: Abdomen is flat. Bowel sounds are normal.      Palpations: Abdomen is soft.      Comments: peg   Musculoskeletal:         General: Normal range of motion.      Cervical back: Neck supple.      Right lower leg: No edema.      Left lower leg: No edema.   Skin:     General: Skin is warm  and dry.      Capillary Refill: Capillary refill takes less than 2 seconds.      Coloration: Skin is not pale.   Neurological:      Mental Status: He is alert.      Comments: Opens eyes spontaneously but does not obey any commands   Psychiatric:         Behavior: Behavior normal.           Review of Systems    Vents:  Vent Mode: AVAPS (12/19/23 0450)  Ventilator Initiated: Yes (12/19/23 0450)  Set Rate: 0 BPM (12/18/23 0756)  Vt Set: 450 mL (12/19/23 0450)  Pressure Support: 12 cmH20 (12/19/23 0450)  PEEP/CPAP: 5 cmH20 (12/19/23 0450)  Oxygen Concentration (%): 30 (12/19/23 1500)  Peak Airway Pressure: 14.7 cmH20 (12/19/23 0450)  Plateau Pressure: 341 cmH20 (11/18/23 0950)  Total Ve: 15.2 L/m (12/19/23 0450)  F/VT Ratio<105 (RSBI): (!) 69.67 (12/19/23 0450)    Lines/Drains/Airways       Drain  Duration                  Gastrostomy/Enterostomy midline -- days              Airway  Duration             Adult Surgical Airway Portex 8.0 mm DIC cuffed -- days                    Significant Labs:    CBC/Anemia Profile:  Recent Labs   Lab 12/19/23  0503   WBC 10.48   HGB 11.6*   HCT 37.6*   *   MCV 91.5   RDW 18.3*          Chemistries:  Recent Labs   Lab 12/19/23  0457   *   K 4.2   CL 99   CO2 23   BUN 14   CREATININE 0.60*   CALCIUM 9.2         All pertinent labs within the past 24 hours have been reviewed.    Significant Imaging:  I have reviewed all pertinent imaging results/findings within the past 24 hours.

## 2023-12-19 NOTE — CARE UPDATE
12/18/23 1902   Patient Assessment/Suction   Level of Consciousness (AVPU) responds to voice   Respiratory Effort Normal;Unlabored   Expansion/Accessory Muscles/Retractions no use of accessory muscles;no retractions   All Lung Fields Breath Sounds Anterior:;Lateral:;diminished;coarse   Rhythm/Pattern, Respiratory depth regular;pattern regular;unlabored;no shortness of breath reported  (on Trach collar 30%)   Cough Frequency no cough   Suction Method oral;tracheal   $ Suction Charges NT Suction Procedure   Secretions Amount scant   Secretions Color white;clear   Secretions Characteristics tenacious   Skin Integrity   $ Wound Care Tech Time 15 min   Area Observed Neck under tracheostomy   Skin Appearance without discoloration   PRE-TX-O2   Device (Oxygen Therapy) tracheostomy collar   $ Is the patient on Low Flow Oxygen? Yes   Oxygen Concentration (%) 30   SpO2 100 %   Pulse Oximetry Type Continuous   $ Pulse Oximetry - Multiple Charge Pulse Oximetry - Multiple   Probe Placed On (Pulse Ox) forehead   Oximetry Probe Status Applied   Pulse 94   Resp 20   Positioning HOB elevated 30 degrees;HOB elevated 45 degrees   Aerosol Therapy   $ Aerosol Therapy Charges Aerosol Treatment   Daily Review of Necessity (SVN) completed   Respiratory Treatment Status (SVN) given  (Duoneb)   Treatment Route (SVN) in-line   Patient Position (SVN) HOB elevated   Post Treatment Assessment (SVN) congestion decreased;increased aeration   Signs of Intolerance (SVN) none   Breath Sounds Post-Respiratory Treatment   Throughout All Fields Post-Treatment All Fields   Throughout All Fields Post-Treatment Anterior:;Lateral:;diminished;clear   Post-treatment Heart Rate (beats/min) 88   Post-treatment Resp Rate (breaths/min) 20   Adult Surgical Airway Portex 8.0 mm DIC cuffed   No placement date or time found.   Brand: Portex  Airway Device Size and Style: 8.0 mm DIC cuffed   Status Secured   Site Assessment Clean;Dry   Ties Assessment Dry   Airway  "Safety   Is Ambu Bag and Mask with Patient? Yes, Adult Ambu Bag and Mask   Trach Supplies at Bedside Inner Cannula;Suction Catheter   O2  at bedside? No   Extra rudi tubes are at the bedside? Yes   Suction set is at the bedside? Yes   ETT at Bedside? Yes   "Neck breather" sign is posted above head of bed?  Yes   Communication board is with the patient? Yes   Patient is wearing alert bracelet? Yes   Extra trach at bedside? Yes   Extra trach sizes at bedside? 8   Is Obturator Available? Yes   Location of Obturator?  Bedside table   Ready to Wean/Extubation Screen   FIO2<=50 (chart decimal) 0.3       "

## 2023-12-19 NOTE — PLAN OF CARE
Weekly Swing Bed Note    Patient is on trach collar trial with 30% o2.  Sat 100% on o2.  Will rest on AVAPS with 30% o2 and  and 30% o2.  Tolerating trach collar well.  BBS Coarse.

## 2023-12-19 NOTE — PROGRESS NOTES
[facility] - [facility] Surgical Unit  Pulmonology  Progress Note    Patient Name: Blue Abdul  MRN: [id number]  Admission Date: 11/9/2023  Hospital Length of Stay: 40 days  Code Status: Full Code  Attending Provider: Ramiro Flores MD  Primary Care Provider: Nati Doyle FNP   Principal Problem: Acute hypercapnic respiratory failure    Subjective:     Interval History: No acute events. Currently resting comfortably. More awake and alert today. Afebrile today and vital signs are stable.      Objective:     Vital Signs (Most Recent):  Temp: 99.5 °F (37.5 °C) (12/19/23 1100)  Pulse: 105 (12/19/23 1100)  Resp: (!) 75 (12/19/23 1100)  BP: (!) 90/44 (12/19/23 1100)  SpO2: 100 % (12/19/23 1100) Vital Signs (24h Range):  Temp:  [98.6 °F (37 °C)-99.5 °F (37.5 °C)] 99.5 °F (37.5 °C)  Pulse:  [] 105  Resp:  [17-75] 75  SpO2:  [98 %-100 %] 100 %  BP: ()/(44-77) 90/44     Weight: 88.8 kg (195 lb 12 oz)  Body mass index is 39.54 kg/m².      Intake/Output Summary (Last 24 hours) at 12/19/2023 1626  Last data filed at 12/18/2023 1814  Gross per 24 hour   Intake 1140 ml   Output --   Net 1140 ml          Physical Exam  Vitals reviewed.   Constitutional:       General: He is not in acute distress.     Appearance: He is ill-appearing.      Interventions: He is not intubated.  HENT:      Head: Normocephalic and atraumatic.      Right Ear: External ear normal.      Left Ear: External ear normal.      Nose: Nose normal.      Mouth/Throat:      Mouth: Mucous membranes are dry.      Pharynx: Oropharynx is clear.      Comments: Trach in place  Eyes:      Extraocular Movements: Extraocular movements intact.      Conjunctiva/sclera: Conjunctivae normal.      Pupils: Pupils are equal, round, and reactive to light.   Neck:      Comments: trach  Cardiovascular:      Rate and Rhythm: Normal rate.      Heart sounds: Normal heart sounds. No murmur heard.  Pulmonary:      Effort: Pulmonary effort is normal.  He is not intubated.      Breath sounds: No wheezing, rhonchi or rales.   Abdominal:      General: Abdomen is flat. Bowel sounds are normal.      Palpations: Abdomen is soft.      Comments: peg   Musculoskeletal:         General: Normal range of motion.      Cervical back: Neck supple.      Right lower leg: No edema.      Left lower leg: No edema.   Skin:     General: Skin is warm and dry.      Capillary Refill: Capillary refill takes less than 2 seconds.      Coloration: Skin is not pale.   Neurological:      Mental Status: He is alert.      Comments: Opens eyes spontaneously but does not obey any commands   Psychiatric:         Behavior: Behavior normal.           Review of Systems    Vents:  Vent Mode: AVAPS (12/19/23 0450)  Ventilator Initiated: Yes (12/19/23 0450)  Set Rate: 0 BPM (12/18/23 0756)  Vt Set: 450 mL (12/19/23 0450)  Pressure Support: 12 cmH20 (12/19/23 0450)  PEEP/CPAP: 5 cmH20 (12/19/23 0450)  Oxygen Concentration (%): 30 (12/19/23 1500)  Peak Airway Pressure: 14.7 cmH20 (12/19/23 0450)  Plateau Pressure: 341 cmH20 (11/18/23 0950)  Total Ve: 15.2 L/m (12/19/23 0450)  F/VT Ratio<105 (RSBI): (!) 69.67 (12/19/23 0450)    Lines/Drains/Airways       Drain  Duration                  Gastrostomy/Enterostomy midline -- days              Airway  Duration             Adult Surgical Airway Portex 8.0 mm DIC cuffed -- days                    Significant Labs:    CBC/Anemia Profile:  Recent Labs   Lab 12/19/23  0503   WBC 10.48   HGB 11.6*   HCT 37.6*   *   MCV 91.5   RDW 18.3*          Chemistries:  Recent Labs   Lab 12/19/23  0457   *   K 4.2   CL 99   CO2 23   BUN 14   CREATININE 0.60*   CALCIUM 9.2         All pertinent labs within the past 24 hours have been reviewed.    Significant Imaging:  I have reviewed all pertinent imaging results/findings within the past 24 hours.  Assessment/Plan:     Neuro  Encephalopathy, metabolic  Opens eyes but does not do anything to command  Likely component  of anoxic encephalopathy  He also had low blood sugar which could have contributed  11/22- no significant change. Questionable posturing  12/13- much more awake and alert. Does not obey any commands    Seizures  No recent seizure activity  Patient is on keppra 500 mg bid    Pulmonary  * Acute hypercapnic respiratory failure  Currently on AVAPS 6 hours bid and doing ok  Continue to wean as tolerated  Sputum culture with heavy growth MRSA so Started linezolid bid for 7 days  11/22- afebrile. Will start some trach collar today  12/13- Doing well with trach collar. Plan to try to get to 16 hours of trach collar and will rest on avaps overnight  12/19- more purulent sputum production. Prelim culture with staph. I am going to start linezolid for 7 days. F/U culture and shape therapy accordingly  Doing well with trials. Going to increase as tolerated    Endocrine  Diabetes mellitus  Accuchecks are ok  Currently on no medications  Can monitor blood sugar and if needed add sliding scale                 Craig Estrada, DO  Pulmonology  [facility] - [facility]

## 2023-12-20 LAB — GLUCOSE SERPL-MCNC: 182 MG/DL (ref 70–105)

## 2023-12-20 PROCEDURE — 27000958

## 2023-12-20 PROCEDURE — 25000242 PHARM REV CODE 250 ALT 637 W/ HCPCS: Performed by: INTERNAL MEDICINE

## 2023-12-20 PROCEDURE — 99900026 HC AIRWAY MAINTENANCE (STAT)

## 2023-12-20 PROCEDURE — 25000003 PHARM REV CODE 250: Performed by: REGISTERED NURSE

## 2023-12-20 PROCEDURE — 94003 VENT MGMT INPAT SUBQ DAY: CPT

## 2023-12-20 PROCEDURE — 27000221 HC OXYGEN, UP TO 24 HOURS

## 2023-12-20 PROCEDURE — 11000004 HC SNF PRIVATE

## 2023-12-20 PROCEDURE — 99900035 HC TECH TIME PER 15 MIN (STAT)

## 2023-12-20 PROCEDURE — 94761 N-INVAS EAR/PLS OXIMETRY MLT: CPT

## 2023-12-20 PROCEDURE — 82962 GLUCOSE BLOOD TEST: CPT

## 2023-12-20 PROCEDURE — 25000003 PHARM REV CODE 250: Performed by: INTERNAL MEDICINE

## 2023-12-20 PROCEDURE — 94640 AIRWAY INHALATION TREATMENT: CPT

## 2023-12-20 PROCEDURE — 63600175 PHARM REV CODE 636 W HCPCS: Performed by: INTERNAL MEDICINE

## 2023-12-20 PROCEDURE — 27000935

## 2023-12-20 RX ADMIN — IPRATROPIUM BROMIDE AND ALBUTEROL SULFATE 3 ML: 2.5; .5 SOLUTION RESPIRATORY (INHALATION) at 07:12

## 2023-12-20 RX ADMIN — PANTOPRAZOLE SODIUM 40 MG: 40 GRANULE, DELAYED RELEASE ORAL at 09:12

## 2023-12-20 RX ADMIN — PANTOPRAZOLE SODIUM 40 MG: 40 GRANULE, DELAYED RELEASE ORAL at 08:12

## 2023-12-20 RX ADMIN — LINEZOLID 600 MG: 600 TABLET, FILM COATED ORAL at 08:12

## 2023-12-20 RX ADMIN — BUDESONIDE 0.5 MG: 0.5 INHALANT ORAL at 07:12

## 2023-12-20 RX ADMIN — POTASSIUM IODIDE 5 DROP: 1 SOLUTION ORAL at 08:12

## 2023-12-20 RX ADMIN — POTASSIUM IODIDE 5 DROP: 1 SOLUTION ORAL at 09:12

## 2023-12-20 RX ADMIN — POTASSIUM IODIDE 5 DROP: 1 SOLUTION ORAL at 12:12

## 2023-12-20 RX ADMIN — POTASSIUM IODIDE 5 DROP: 1 SOLUTION ORAL at 04:12

## 2023-12-20 RX ADMIN — LEVETIRACETAM 500 MG: 500 SOLUTION ORAL at 08:12

## 2023-12-20 RX ADMIN — LEVETIRACETAM 500 MG: 500 SOLUTION ORAL at 09:12

## 2023-12-20 RX ADMIN — APIXABAN 5 MG: 2.5 TABLET, FILM COATED ORAL at 09:12

## 2023-12-20 RX ADMIN — PREDNISONE 20 MG: 20 TABLET ORAL at 08:12

## 2023-12-20 RX ADMIN — LINEZOLID 600 MG: 600 TABLET, FILM COATED ORAL at 09:12

## 2023-12-20 RX ADMIN — APIXABAN 5 MG: 2.5 TABLET, FILM COATED ORAL at 08:12

## 2023-12-20 NOTE — PLAN OF CARE
Problem: Inability to Wean (Mechanical Ventilation, Invasive)  Goal: Mechanical Ventilation Liberation  Outcome: Ongoing, Progressing     Problem: Communication Impairment (Artificial Airway)  Goal: Effective Communication  Outcome: Ongoing, Progressing     Problem: Device-Related Complication Risk (Artificial Airway)  Goal: Optimal Device Function  Outcome: Ongoing, Progressing     Problem: Gas Exchange Impaired  Goal: Optimal Gas Exchange  Outcome: Ongoing, Progressing

## 2023-12-20 NOTE — RESPIRATORY THERAPY
0706    Placed pt on TC X 17 hrs today    Will continue to monitor pt     02-96%    HR-93    RR-19

## 2023-12-20 NOTE — PLAN OF CARE
Problem: Adult Inpatient Plan of Care  Goal: Plan of Care Review  Outcome: Ongoing, Progressing  Goal: Patient-Specific Goal (Individualized)  Outcome: Ongoing, Progressing  Goal: Absence of Hospital-Acquired Illness or Injury  Outcome: Ongoing, Progressing  Goal: Optimal Comfort and Wellbeing  Outcome: Ongoing, Progressing     Problem: Communication Impairment (Mechanical Ventilation, Invasive)  Goal: Effective Communication  Outcome: Ongoing, Progressing     Problem: Device-Related Complication Risk (Mechanical Ventilation, Invasive)  Goal: Optimal Device Function  Outcome: Ongoing, Progressing     Problem: Inability to Wean (Mechanical Ventilation, Invasive)  Goal: Mechanical Ventilation Liberation  Outcome: Ongoing, Progressing     Problem: Nutrition Impairment (Mechanical Ventilation, Invasive)  Goal: Optimal Nutrition Delivery  Outcome: Ongoing, Progressing     Problem: Skin and Tissue Injury (Mechanical Ventilation, Invasive)  Goal: Absence of Device-Related Skin and Tissue Injury  Outcome: Ongoing, Progressing     Problem: Communication Impairment (Artificial Airway)  Goal: Effective Communication  Outcome: Ongoing, Progressing     Problem: Device-Related Complication Risk (Artificial Airway)  Goal: Optimal Device Function  Outcome: Ongoing, Progressing     Problem: Skin and Tissue Injury (Artificial Airway)  Goal: Absence of Device-Related Skin or Tissue Injury  Outcome: Ongoing, Progressing     Problem: Fall Injury Risk  Goal: Absence of Fall and Fall-Related Injury  Outcome: Ongoing, Progressing     Problem: Skin Injury Risk Increased  Goal: Skin Health and Integrity  Outcome: Ongoing, Progressing     Problem: Diabetes Comorbidity  Goal: Blood Glucose Level Within Targeted Range  Outcome: Ongoing, Progressing     Problem: Gas Exchange Impaired  Goal: Optimal Gas Exchange  Outcome: Ongoing, Progressing

## 2023-12-20 NOTE — PROGRESS NOTES
Inflated patient's trach cuff with sterile water using the minimal leak technique and placed patient back on the vent via AVAP-AE with previous settings for rest.  Heart Rate 106, respiratory rate 22, O2 sats 96% on 30%, and blood pressure 103/45.  Patient did 16 hours of Trach Collar.  Patient was on Trach Collar 30% all day but I had to increase him to 40% at 1950 to maintain sats.  Patient's O2 sats were 90%-92% on 30% but increased to 92%- 94% on 40%.  Overall patient did fine on Trach Collar today.

## 2023-12-20 NOTE — PLAN OF CARE
Problem: Adult Inpatient Plan of Care  Goal: Plan of Care Review  Outcome: Ongoing, Progressing  Goal: Patient-Specific Goal (Individualized)  Outcome: Ongoing, Progressing  Goal: Absence of Hospital-Acquired Illness or Injury  Outcome: Ongoing, Progressing  Goal: Optimal Comfort and Wellbeing  Outcome: Ongoing, Progressing  Goal: Readiness for Transition of Care  Outcome: Ongoing, Progressing     Problem: Communication Impairment (Mechanical Ventilation, Invasive)  Goal: Effective Communication  Outcome: Ongoing, Progressing

## 2023-12-20 NOTE — PLAN OF CARE
Problem: Adult Inpatient Plan of Care  Goal: Plan of Care Review  Outcome: Ongoing, Progressing     Problem: Adult Inpatient Plan of Care  Goal: Optimal Comfort and Wellbeing  Outcome: Ongoing, Progressing     Problem: Device-Related Complication Risk (Artificial Airway)  Goal: Optimal Device Function  Outcome: Ongoing, Progressing     Problem: Skin and Tissue Injury (Artificial Airway)  Goal: Absence of Device-Related Skin or Tissue Injury  Outcome: Ongoing, Progressing

## 2023-12-20 NOTE — PLAN OF CARE
Problem: Adult Inpatient Plan of Care  Goal: Plan of Care Review  Outcome: Ongoing, Progressing  Goal: Patient-Specific Goal (Individualized)  Outcome: Ongoing, Progressing  Goal: Absence of Hospital-Acquired Illness or Injury  Outcome: Ongoing, Progressing  Goal: Optimal Comfort and Wellbeing  Outcome: Ongoing, Progressing     Problem: Communication Impairment (Mechanical Ventilation, Invasive)  Goal: Effective Communication  Outcome: Ongoing, Progressing     Problem: Device-Related Complication Risk (Mechanical Ventilation, Invasive)  Goal: Optimal Device Function  Outcome: Ongoing, Progressing     Problem: Inability to Wean (Mechanical Ventilation, Invasive)  Goal: Mechanical Ventilation Liberation  Outcome: Ongoing, Progressing     Problem: Nutrition Impairment (Mechanical Ventilation, Invasive)  Goal: Optimal Nutrition Delivery  Outcome: Ongoing, Progressing     Problem: Skin and Tissue Injury (Mechanical Ventilation, Invasive)  Goal: Absence of Device-Related Skin and Tissue Injury  Outcome: Ongoing, Progressing     Problem: Ventilator-Induced Lung Injury (Mechanical Ventilation, Invasive)  Goal: Absence of Ventilator-Induced Lung Injury  Outcome: Ongoing, Progressing     Problem: Communication Impairment (Artificial Airway)  Goal: Effective Communication  Outcome: Ongoing, Progressing     Problem: Device-Related Complication Risk (Artificial Airway)  Goal: Optimal Device Function  Outcome: Ongoing, Progressing     Problem: Skin and Tissue Injury (Artificial Airway)  Goal: Absence of Device-Related Skin or Tissue Injury  Outcome: Ongoing, Progressing     Problem: Skin Injury Risk Increased  Goal: Skin Health and Integrity  Outcome: Ongoing, Progressing     Problem: Impaired Wound Healing  Goal: Optimal Wound Healing  Outcome: Ongoing, Progressing     Problem: Gas Exchange Impaired  Goal: Optimal Gas Exchange  Outcome: Ongoing, Progressing

## 2023-12-21 LAB
CULTURE, LOWER RESPIRATORY: ABNORMAL

## 2023-12-21 PROCEDURE — 63600175 PHARM REV CODE 636 W HCPCS: Performed by: INTERNAL MEDICINE

## 2023-12-21 PROCEDURE — 25000003 PHARM REV CODE 250: Performed by: REGISTERED NURSE

## 2023-12-21 PROCEDURE — 25000003 PHARM REV CODE 250: Performed by: INTERNAL MEDICINE

## 2023-12-21 PROCEDURE — 27200966 HC CLOSED SUCTION SYSTEM

## 2023-12-21 PROCEDURE — 99900035 HC TECH TIME PER 15 MIN (STAT)

## 2023-12-21 PROCEDURE — 94761 N-INVAS EAR/PLS OXIMETRY MLT: CPT

## 2023-12-21 PROCEDURE — 94640 AIRWAY INHALATION TREATMENT: CPT

## 2023-12-21 PROCEDURE — 27000221 HC OXYGEN, UP TO 24 HOURS

## 2023-12-21 PROCEDURE — 25000242 PHARM REV CODE 250 ALT 637 W/ HCPCS: Performed by: INTERNAL MEDICINE

## 2023-12-21 PROCEDURE — 99900026 HC AIRWAY MAINTENANCE (STAT)

## 2023-12-21 PROCEDURE — 11000004 HC SNF PRIVATE

## 2023-12-21 PROCEDURE — 94003 VENT MGMT INPAT SUBQ DAY: CPT

## 2023-12-21 RX ADMIN — APIXABAN 5 MG: 2.5 TABLET, FILM COATED ORAL at 09:12

## 2023-12-21 RX ADMIN — LEVETIRACETAM 500 MG: 500 SOLUTION ORAL at 09:12

## 2023-12-21 RX ADMIN — POTASSIUM IODIDE 5 DROP: 1 SOLUTION ORAL at 05:12

## 2023-12-21 RX ADMIN — IPRATROPIUM BROMIDE AND ALBUTEROL SULFATE 3 ML: 2.5; .5 SOLUTION RESPIRATORY (INHALATION) at 07:12

## 2023-12-21 RX ADMIN — BUDESONIDE 0.5 MG: 0.5 INHALANT ORAL at 07:12

## 2023-12-21 RX ADMIN — LINEZOLID 600 MG: 600 TABLET, FILM COATED ORAL at 09:12

## 2023-12-21 RX ADMIN — PREDNISONE 20 MG: 20 TABLET ORAL at 09:12

## 2023-12-21 RX ADMIN — POTASSIUM IODIDE 5 DROP: 1 SOLUTION ORAL at 09:12

## 2023-12-21 RX ADMIN — POTASSIUM IODIDE 5 DROP: 1 SOLUTION ORAL at 02:12

## 2023-12-21 RX ADMIN — PANTOPRAZOLE SODIUM 40 MG: 40 GRANULE, DELAYED RELEASE ORAL at 09:12

## 2023-12-21 RX ADMIN — BUDESONIDE 0.5 MG: 0.5 INHALANT ORAL at 08:12

## 2023-12-21 RX ADMIN — PIPERACILLIN AND TAZOBACTAM 4.5 G: 4; .5 INJECTION, POWDER, FOR SOLUTION INTRAVENOUS; PARENTERAL at 11:12

## 2023-12-21 RX ADMIN — PIPERACILLIN AND TAZOBACTAM 4.5 G: 4; .5 INJECTION, POWDER, FOR SOLUTION INTRAVENOUS; PARENTERAL at 05:12

## 2023-12-21 NOTE — CARE UPDATE
Placed pt on trach collar trial. Pts goal is 18 hours. Pt tolerated transition well and is stable at this time.

## 2023-12-21 NOTE — PROGRESS NOTES
Placed patient back on the vent via AVAPS-AE with previous settings for rest.   Heart rate 114, Respiratory rate 26, O2 sats 94% and blood pressure 122/74.  Patient did 17 hours of Trach Collar 30% and did fine.  Patient started to get tired towards the end of the trial and his sats would decreased at times to the upper 80's but overall he did fine.

## 2023-12-22 LAB — GLUCOSE SERPL-MCNC: 181 MG/DL (ref 70–105)

## 2023-12-22 PROCEDURE — 63600175 PHARM REV CODE 636 W HCPCS: Performed by: INTERNAL MEDICINE

## 2023-12-22 PROCEDURE — 25000003 PHARM REV CODE 250: Performed by: INTERNAL MEDICINE

## 2023-12-22 PROCEDURE — 99900026 HC AIRWAY MAINTENANCE (STAT)

## 2023-12-22 PROCEDURE — 99900035 HC TECH TIME PER 15 MIN (STAT)

## 2023-12-22 PROCEDURE — 94761 N-INVAS EAR/PLS OXIMETRY MLT: CPT

## 2023-12-22 PROCEDURE — 27000221 HC OXYGEN, UP TO 24 HOURS

## 2023-12-22 PROCEDURE — 94003 VENT MGMT INPAT SUBQ DAY: CPT

## 2023-12-22 PROCEDURE — 11000004 HC SNF PRIVATE

## 2023-12-22 PROCEDURE — 27200966 HC CLOSED SUCTION SYSTEM

## 2023-12-22 PROCEDURE — 25000003 PHARM REV CODE 250: Performed by: REGISTERED NURSE

## 2023-12-22 PROCEDURE — 94640 AIRWAY INHALATION TREATMENT: CPT

## 2023-12-22 PROCEDURE — 82962 GLUCOSE BLOOD TEST: CPT

## 2023-12-22 PROCEDURE — 25000242 PHARM REV CODE 250 ALT 637 W/ HCPCS: Performed by: INTERNAL MEDICINE

## 2023-12-22 RX ORDER — DEXTROSE MONOHYDRATE 5 G/100ML
INJECTION INTRAVENOUS
Status: DISPENSED
Start: 2023-12-22 | End: 2023-12-22

## 2023-12-22 RX ADMIN — BUDESONIDE 0.5 MG: 0.5 INHALANT ORAL at 07:12

## 2023-12-22 RX ADMIN — PIPERACILLIN AND TAZOBACTAM 4.5 G: 4; .5 INJECTION, POWDER, FOR SOLUTION INTRAVENOUS; PARENTERAL at 08:12

## 2023-12-22 RX ADMIN — LINEZOLID 600 MG: 600 TABLET, FILM COATED ORAL at 08:12

## 2023-12-22 RX ADMIN — POTASSIUM IODIDE 5 DROP: 1 SOLUTION ORAL at 05:12

## 2023-12-22 RX ADMIN — APIXABAN 5 MG: 2.5 TABLET, FILM COATED ORAL at 08:12

## 2023-12-22 RX ADMIN — SODIUM CHLORIDE 250 ML: 9 INJECTION, SOLUTION INTRAVENOUS at 08:12

## 2023-12-22 RX ADMIN — LEVETIRACETAM 500 MG: 500 SOLUTION ORAL at 08:12

## 2023-12-22 RX ADMIN — PREDNISONE 20 MG: 20 TABLET ORAL at 08:12

## 2023-12-22 RX ADMIN — PIPERACILLIN AND TAZOBACTAM 4.5 G: 4; .5 INJECTION, POWDER, FOR SOLUTION INTRAVENOUS; PARENTERAL at 01:12

## 2023-12-22 RX ADMIN — POTASSIUM IODIDE 5 DROP: 1 SOLUTION ORAL at 09:12

## 2023-12-22 RX ADMIN — POTASSIUM IODIDE 5 DROP: 1 SOLUTION ORAL at 08:12

## 2023-12-22 RX ADMIN — PANTOPRAZOLE SODIUM 40 MG: 40 GRANULE, DELAYED RELEASE ORAL at 08:12

## 2023-12-22 RX ADMIN — IPRATROPIUM BROMIDE AND ALBUTEROL SULFATE 3 ML: 2.5; .5 SOLUTION RESPIRATORY (INHALATION) at 07:12

## 2023-12-22 RX ADMIN — PIPERACILLIN AND TAZOBACTAM 4.5 G: 4; .5 INJECTION, POWDER, FOR SOLUTION INTRAVENOUS; PARENTERAL at 05:12

## 2023-12-22 RX ADMIN — POTASSIUM IODIDE 5 DROP: 1 SOLUTION ORAL at 01:12

## 2023-12-22 NOTE — PROGRESS NOTES
Wt: 195#  RDN visited pt this a.m. TF rate and flush are appropriate.  No new lab. Pt  continues to do TC trials.  Pt is not following commands. Noted new orders for Zyvox and Zosyn.  Rec 1. Lactobacillus per PEG

## 2023-12-22 NOTE — PLAN OF CARE
Problem: Adult Inpatient Plan of Care  Goal: Plan of Care Review  Outcome: Ongoing, Progressing  Goal: Patient-Specific Goal (Individualized)  Outcome: Ongoing, Progressing  Goal: Absence of Hospital-Acquired Illness or Injury  Outcome: Ongoing, Progressing  Goal: Optimal Comfort and Wellbeing  Outcome: Ongoing, Progressing  Goal: Readiness for Transition of Care  Outcome: Ongoing, Progressing     Problem: Communication Impairment (Mechanical Ventilation, Invasive)  Goal: Effective Communication  Outcome: Ongoing, Progressing     Problem: Device-Related Complication Risk (Mechanical Ventilation, Invasive)  Goal: Optimal Device Function  Outcome: Ongoing, Progressing     Problem: Fall Injury Risk  Goal: Absence of Fall and Fall-Related Injury  Outcome: Ongoing, Progressing

## 2023-12-22 NOTE — PLAN OF CARE
Patient is resting comfortably on 30% trach collar. O2 sat 99% lungs are mostly clear with scattered rhonchi. Patient is doing 18 hour trach collar trial at this time. At completion he should be placed back on AVAPS at 2 a.m.

## 2023-12-23 LAB — GLUCOSE SERPL-MCNC: 182 MG/DL (ref 70–105)

## 2023-12-23 PROCEDURE — 94003 VENT MGMT INPAT SUBQ DAY: CPT

## 2023-12-23 PROCEDURE — 25000242 PHARM REV CODE 250 ALT 637 W/ HCPCS: Performed by: INTERNAL MEDICINE

## 2023-12-23 PROCEDURE — 25000003 PHARM REV CODE 250: Performed by: INTERNAL MEDICINE

## 2023-12-23 PROCEDURE — 27000958

## 2023-12-23 PROCEDURE — 94640 AIRWAY INHALATION TREATMENT: CPT

## 2023-12-23 PROCEDURE — 27000935

## 2023-12-23 PROCEDURE — 99900035 HC TECH TIME PER 15 MIN (STAT)

## 2023-12-23 PROCEDURE — 11000004 HC SNF PRIVATE

## 2023-12-23 PROCEDURE — 99900026 HC AIRWAY MAINTENANCE (STAT)

## 2023-12-23 PROCEDURE — 63600175 PHARM REV CODE 636 W HCPCS: Performed by: INTERNAL MEDICINE

## 2023-12-23 PROCEDURE — 27000221 HC OXYGEN, UP TO 24 HOURS

## 2023-12-23 PROCEDURE — 25000003 PHARM REV CODE 250: Performed by: REGISTERED NURSE

## 2023-12-23 PROCEDURE — 82962 GLUCOSE BLOOD TEST: CPT

## 2023-12-23 PROCEDURE — 94761 N-INVAS EAR/PLS OXIMETRY MLT: CPT

## 2023-12-23 RX ADMIN — APIXABAN 5 MG: 2.5 TABLET, FILM COATED ORAL at 08:12

## 2023-12-23 RX ADMIN — PIPERACILLIN AND TAZOBACTAM 4.5 G: 4; .5 INJECTION, POWDER, FOR SOLUTION INTRAVENOUS; PARENTERAL at 08:12

## 2023-12-23 RX ADMIN — POTASSIUM IODIDE 5 DROP: 1 SOLUTION ORAL at 05:12

## 2023-12-23 RX ADMIN — LEVETIRACETAM 500 MG: 500 SOLUTION ORAL at 08:12

## 2023-12-23 RX ADMIN — PREDNISONE 20 MG: 20 TABLET ORAL at 08:12

## 2023-12-23 RX ADMIN — IPRATROPIUM BROMIDE AND ALBUTEROL SULFATE 3 ML: 2.5; .5 SOLUTION RESPIRATORY (INHALATION) at 07:12

## 2023-12-23 RX ADMIN — LINEZOLID 600 MG: 600 TABLET, FILM COATED ORAL at 08:12

## 2023-12-23 RX ADMIN — PANTOPRAZOLE SODIUM 40 MG: 40 GRANULE, DELAYED RELEASE ORAL at 08:12

## 2023-12-23 RX ADMIN — PIPERACILLIN AND TAZOBACTAM 4.5 G: 4; .5 INJECTION, POWDER, FOR SOLUTION INTRAVENOUS; PARENTERAL at 05:12

## 2023-12-23 RX ADMIN — POTASSIUM IODIDE 5 DROP: 1 SOLUTION ORAL at 01:12

## 2023-12-23 RX ADMIN — POTASSIUM IODIDE 5 DROP: 1 SOLUTION ORAL at 08:12

## 2023-12-23 RX ADMIN — BUDESONIDE 0.5 MG: 0.5 INHALANT ORAL at 07:12

## 2023-12-23 RX ADMIN — PIPERACILLIN AND TAZOBACTAM 4.5 G: 4; .5 INJECTION, POWDER, FOR SOLUTION INTRAVENOUS; PARENTERAL at 01:12

## 2023-12-23 NOTE — CARE UPDATE
0705    Placed pt on TC @ 30% X 20 hours today    Nurse notified    Will continue to monitor pt    %

## 2023-12-23 NOTE — PLAN OF CARE
Problem: Adult Inpatient Plan of Care  Goal: Plan of Care Review  Outcome: Ongoing, Progressing     Problem: Adult Inpatient Plan of Care  Goal: Absence of Hospital-Acquired Illness or Injury  Outcome: Ongoing, Progressing     Problem: Adult Inpatient Plan of Care  Goal: Optimal Comfort and Wellbeing  Outcome: Ongoing, Progressing     Problem: Communication Impairment (Mechanical Ventilation, Invasive)  Goal: Effective Communication  Outcome: Ongoing, Progressing

## 2023-12-23 NOTE — PLAN OF CARE
Patient is currently on a 19 hour trach collar trial. He will be switched back to AVAPS after completion of trach collar trial around 0300. Patient was resting comfortably until his mother and I straightened him in the bed.

## 2023-12-24 LAB
BACTERIA BLD CULT: NORMAL
BACTERIA BLD CULT: NORMAL
GLUCOSE SERPL-MCNC: 176 MG/DL (ref 70–105)

## 2023-12-24 PROCEDURE — 25000003 PHARM REV CODE 250: Performed by: INTERNAL MEDICINE

## 2023-12-24 PROCEDURE — 94003 VENT MGMT INPAT SUBQ DAY: CPT

## 2023-12-24 PROCEDURE — 94761 N-INVAS EAR/PLS OXIMETRY MLT: CPT

## 2023-12-24 PROCEDURE — 94640 AIRWAY INHALATION TREATMENT: CPT

## 2023-12-24 PROCEDURE — 27000958

## 2023-12-24 PROCEDURE — 25000242 PHARM REV CODE 250 ALT 637 W/ HCPCS: Performed by: INTERNAL MEDICINE

## 2023-12-24 PROCEDURE — 25000003 PHARM REV CODE 250: Performed by: REGISTERED NURSE

## 2023-12-24 PROCEDURE — 63600175 PHARM REV CODE 636 W HCPCS: Performed by: INTERNAL MEDICINE

## 2023-12-24 PROCEDURE — 27000935

## 2023-12-24 PROCEDURE — 27000221 HC OXYGEN, UP TO 24 HOURS

## 2023-12-24 PROCEDURE — 27000716 HC OXISENSOR PROBE, ANY SIZE

## 2023-12-24 PROCEDURE — 82962 GLUCOSE BLOOD TEST: CPT

## 2023-12-24 PROCEDURE — 11000004 HC SNF PRIVATE

## 2023-12-24 PROCEDURE — 99900026 HC AIRWAY MAINTENANCE (STAT)

## 2023-12-24 PROCEDURE — 99900035 HC TECH TIME PER 15 MIN (STAT)

## 2023-12-24 RX ADMIN — BUDESONIDE 0.5 MG: 0.5 INHALANT ORAL at 07:12

## 2023-12-24 RX ADMIN — IPRATROPIUM BROMIDE AND ALBUTEROL SULFATE 3 ML: 2.5; .5 SOLUTION RESPIRATORY (INHALATION) at 07:12

## 2023-12-24 RX ADMIN — LINEZOLID 600 MG: 600 TABLET, FILM COATED ORAL at 08:12

## 2023-12-24 RX ADMIN — LEVETIRACETAM 500 MG: 500 SOLUTION ORAL at 08:12

## 2023-12-24 RX ADMIN — PANTOPRAZOLE SODIUM 40 MG: 40 GRANULE, DELAYED RELEASE ORAL at 08:12

## 2023-12-24 RX ADMIN — PIPERACILLIN AND TAZOBACTAM 4.5 G: 4; .5 INJECTION, POWDER, FOR SOLUTION INTRAVENOUS; PARENTERAL at 08:12

## 2023-12-24 RX ADMIN — PREDNISONE 20 MG: 20 TABLET ORAL at 08:12

## 2023-12-24 RX ADMIN — APIXABAN 5 MG: 2.5 TABLET, FILM COATED ORAL at 08:12

## 2023-12-24 RX ADMIN — POTASSIUM IODIDE 5 DROP: 1 SOLUTION ORAL at 08:12

## 2023-12-24 RX ADMIN — POTASSIUM IODIDE 5 DROP: 1 SOLUTION ORAL at 05:12

## 2023-12-24 RX ADMIN — PIPERACILLIN AND TAZOBACTAM 4.5 G: 4; .5 INJECTION, POWDER, FOR SOLUTION INTRAVENOUS; PARENTERAL at 05:12

## 2023-12-24 RX ADMIN — POTASSIUM IODIDE 5 DROP: 1 SOLUTION ORAL at 12:12

## 2023-12-24 RX ADMIN — PIPERACILLIN AND TAZOBACTAM 4.5 G: 4; .5 INJECTION, POWDER, FOR SOLUTION INTRAVENOUS; PARENTERAL at 12:12

## 2023-12-24 NOTE — PLAN OF CARE
Problem: Adult Inpatient Plan of Care  Goal: Plan of Care Review  Outcome: Ongoing, Progressing     Problem: Adult Inpatient Plan of Care  Goal: Absence of Hospital-Acquired Illness or Injury  Outcome: Ongoing, Progressing     Problem: Adult Inpatient Plan of Care  Goal: Optimal Comfort and Wellbeing  Outcome: Ongoing, Progressing     Problem: Device-Related Complication Risk (Mechanical Ventilation, Invasive)  Goal: Optimal Device Function  Outcome: Ongoing, Progressing     Problem: Diabetes Comorbidity  Goal: Blood Glucose Level Within Targeted Range  Outcome: Ongoing, Progressing

## 2023-12-24 NOTE — CARE UPDATE
0615    Placed pt on TC @ 30% X 21 hours today    Will continue to monitor pt     Nurse notified    %

## 2023-12-24 NOTE — CARE UPDATE
Patient was placed back on AVAPS 30%. Cuff inflated. Patient completed 20 hours trach collar trial. O2 sat 100%.

## 2023-12-24 NOTE — PLAN OF CARE
Patient O2 sat 98% on 30% trach collar. Patient is currently doing 20 hour trial. Trial will be complete at 0500 and then patient will do 21 hours tomorrow.

## 2023-12-25 LAB — GLUCOSE SERPL-MCNC: 171 MG/DL (ref 70–105)

## 2023-12-25 PROCEDURE — 25000003 PHARM REV CODE 250: Performed by: INTERNAL MEDICINE

## 2023-12-25 PROCEDURE — 94640 AIRWAY INHALATION TREATMENT: CPT

## 2023-12-25 PROCEDURE — 27000935

## 2023-12-25 PROCEDURE — 82962 GLUCOSE BLOOD TEST: CPT

## 2023-12-25 PROCEDURE — 94003 VENT MGMT INPAT SUBQ DAY: CPT

## 2023-12-25 PROCEDURE — 99900026 HC AIRWAY MAINTENANCE (STAT)

## 2023-12-25 PROCEDURE — 25000242 PHARM REV CODE 250 ALT 637 W/ HCPCS: Performed by: INTERNAL MEDICINE

## 2023-12-25 PROCEDURE — 25000003 PHARM REV CODE 250: Performed by: REGISTERED NURSE

## 2023-12-25 PROCEDURE — 99900035 HC TECH TIME PER 15 MIN (STAT)

## 2023-12-25 PROCEDURE — 63600175 PHARM REV CODE 636 W HCPCS: Performed by: INTERNAL MEDICINE

## 2023-12-25 PROCEDURE — 27000221 HC OXYGEN, UP TO 24 HOURS

## 2023-12-25 PROCEDURE — 11000004 HC SNF PRIVATE

## 2023-12-25 PROCEDURE — 27000941

## 2023-12-25 PROCEDURE — 94761 N-INVAS EAR/PLS OXIMETRY MLT: CPT

## 2023-12-25 RX ORDER — LINEZOLID 600 MG/1
600 TABLET, FILM COATED ORAL EVERY 12 HOURS
Status: COMPLETED | OUTPATIENT
Start: 2023-12-25 | End: 2023-12-26

## 2023-12-25 RX ADMIN — APIXABAN 5 MG: 2.5 TABLET, FILM COATED ORAL at 08:12

## 2023-12-25 RX ADMIN — PIPERACILLIN AND TAZOBACTAM 4.5 G: 4; .5 INJECTION, POWDER, FOR SOLUTION INTRAVENOUS; PARENTERAL at 09:12

## 2023-12-25 RX ADMIN — SODIUM CHLORIDE 250 ML: 9 INJECTION, SOLUTION INTRAVENOUS at 05:12

## 2023-12-25 RX ADMIN — LEVETIRACETAM 500 MG: 500 SOLUTION ORAL at 08:12

## 2023-12-25 RX ADMIN — LINEZOLID 600 MG: 600 TABLET, FILM COATED ORAL at 08:12

## 2023-12-25 RX ADMIN — IPRATROPIUM BROMIDE AND ALBUTEROL SULFATE 3 ML: 2.5; .5 SOLUTION RESPIRATORY (INHALATION) at 07:12

## 2023-12-25 RX ADMIN — PIPERACILLIN AND TAZOBACTAM 4.5 G: 4; .5 INJECTION, POWDER, FOR SOLUTION INTRAVENOUS; PARENTERAL at 01:12

## 2023-12-25 RX ADMIN — APIXABAN 5 MG: 2.5 TABLET, FILM COATED ORAL at 09:12

## 2023-12-25 RX ADMIN — PIPERACILLIN AND TAZOBACTAM 4.5 G: 4; .5 INJECTION, POWDER, FOR SOLUTION INTRAVENOUS; PARENTERAL at 05:12

## 2023-12-25 RX ADMIN — LINEZOLID 600 MG: 600 TABLET, FILM COATED ORAL at 09:12

## 2023-12-25 RX ADMIN — LEVETIRACETAM 500 MG: 500 SOLUTION ORAL at 09:12

## 2023-12-25 RX ADMIN — PANTOPRAZOLE SODIUM 40 MG: 40 GRANULE, DELAYED RELEASE ORAL at 09:12

## 2023-12-25 RX ADMIN — POTASSIUM IODIDE 5 DROP: 1 SOLUTION ORAL at 01:12

## 2023-12-25 RX ADMIN — POTASSIUM IODIDE 5 DROP: 1 SOLUTION ORAL at 05:12

## 2023-12-25 RX ADMIN — PREDNISONE 20 MG: 20 TABLET ORAL at 09:12

## 2023-12-25 RX ADMIN — BUDESONIDE 0.5 MG: 0.5 INHALANT ORAL at 07:12

## 2023-12-25 RX ADMIN — POTASSIUM IODIDE 5 DROP: 1 SOLUTION ORAL at 08:12

## 2023-12-25 RX ADMIN — PANTOPRAZOLE SODIUM 40 MG: 40 GRANULE, DELAYED RELEASE ORAL at 08:12

## 2023-12-25 RX ADMIN — POTASSIUM IODIDE 5 DROP: 1 SOLUTION ORAL at 09:12

## 2023-12-25 NOTE — CARE UPDATE
Placed pt on trach collar at 30%. Pts goal is 22 hours on trach collar. Pt tolerated transition well without complications. Pt is stable at this time.

## 2023-12-26 LAB
ANION GAP SERPL CALCULATED.3IONS-SCNC: 15 MMOL/L (ref 7–16)
BASOPHILS # BLD AUTO: 0.03 K/UL (ref 0–0.2)
BASOPHILS NFR BLD AUTO: 0.3 % (ref 0–1)
BUN SERPL-MCNC: 13 MG/DL (ref 7–18)
BUN/CREAT SERPL: 21 (ref 6–20)
CALCIUM SERPL-MCNC: 9.6 MG/DL (ref 8.5–10.1)
CHLORIDE SERPL-SCNC: 98 MMOL/L (ref 98–107)
CO2 SERPL-SCNC: 28 MMOL/L (ref 21–32)
CREAT SERPL-MCNC: 0.62 MG/DL (ref 0.7–1.3)
DIFFERENTIAL METHOD BLD: ABNORMAL
EGFR (NO RACE VARIABLE) (RUSH/TITUS): 131 ML/MIN/1.73M2
EOSINOPHIL # BLD AUTO: 0.1 K/UL (ref 0–0.5)
EOSINOPHIL NFR BLD AUTO: 1.1 % (ref 1–4)
ERYTHROCYTE [DISTWIDTH] IN BLOOD BY AUTOMATED COUNT: 18.7 % (ref 11.5–14.5)
GLUCOSE SERPL-MCNC: 133 MG/DL (ref 74–106)
HCT VFR BLD AUTO: 38.7 % (ref 40–54)
HGB BLD-MCNC: 12.4 G/DL (ref 13.5–18)
LYMPHOCYTES # BLD AUTO: 2 K/UL (ref 1–4.8)
LYMPHOCYTES NFR BLD AUTO: 22.9 % (ref 27–41)
MCH RBC QN AUTO: 29.1 PG (ref 27–31)
MCHC RBC AUTO-ENTMCNC: 32 G/DL (ref 32–36)
MCV RBC AUTO: 90.8 FL (ref 80–96)
MONOCYTES # BLD AUTO: 0.59 K/UL (ref 0–0.8)
MONOCYTES NFR BLD AUTO: 6.8 % (ref 2–6)
MPC BLD CALC-MCNC: 10 FL (ref 9.4–12.4)
NEUTROPHILS # BLD AUTO: 6.02 K/UL (ref 1.8–7.7)
NEUTROPHILS NFR BLD AUTO: 68.9 % (ref 53–65)
PLATELET # BLD AUTO: 490 K/UL (ref 150–400)
POTASSIUM SERPL-SCNC: 3.8 MMOL/L (ref 3.5–5.1)
RBC # BLD AUTO: 4.26 M/UL (ref 4.6–6.2)
SODIUM SERPL-SCNC: 137 MMOL/L (ref 136–145)
WBC # BLD AUTO: 8.74 K/UL (ref 4.5–11)

## 2023-12-26 PROCEDURE — 27000935

## 2023-12-26 PROCEDURE — 25000003 PHARM REV CODE 250: Performed by: INTERNAL MEDICINE

## 2023-12-26 PROCEDURE — A6212 FOAM DRG <=16 SQ IN W/BORDER: HCPCS

## 2023-12-26 PROCEDURE — 63600175 PHARM REV CODE 636 W HCPCS: Performed by: INTERNAL MEDICINE

## 2023-12-26 PROCEDURE — 99900026 HC AIRWAY MAINTENANCE (STAT)

## 2023-12-26 PROCEDURE — 94640 AIRWAY INHALATION TREATMENT: CPT

## 2023-12-26 PROCEDURE — 99308 SBSQ NF CARE LOW MDM 20: CPT | Mod: ,,, | Performed by: INTERNAL MEDICINE

## 2023-12-26 PROCEDURE — 99900035 HC TECH TIME PER 15 MIN (STAT)

## 2023-12-26 PROCEDURE — 27000221 HC OXYGEN, UP TO 24 HOURS

## 2023-12-26 PROCEDURE — 94003 VENT MGMT INPAT SUBQ DAY: CPT

## 2023-12-26 PROCEDURE — 25000242 PHARM REV CODE 250 ALT 637 W/ HCPCS: Performed by: INTERNAL MEDICINE

## 2023-12-26 PROCEDURE — 11000004 HC SNF PRIVATE

## 2023-12-26 PROCEDURE — 94761 N-INVAS EAR/PLS OXIMETRY MLT: CPT

## 2023-12-26 PROCEDURE — 27200966 HC CLOSED SUCTION SYSTEM

## 2023-12-26 PROCEDURE — 27000941

## 2023-12-26 PROCEDURE — 85025 COMPLETE CBC W/AUTO DIFF WBC: CPT | Performed by: INTERNAL MEDICINE

## 2023-12-26 PROCEDURE — 80048 BASIC METABOLIC PNL TOTAL CA: CPT | Performed by: INTERNAL MEDICINE

## 2023-12-26 PROCEDURE — 25000003 PHARM REV CODE 250: Performed by: REGISTERED NURSE

## 2023-12-26 RX ADMIN — BUDESONIDE 0.5 MG: 0.5 INHALANT ORAL at 07:12

## 2023-12-26 RX ADMIN — PREDNISONE 20 MG: 20 TABLET ORAL at 09:12

## 2023-12-26 RX ADMIN — APIXABAN 5 MG: 2.5 TABLET, FILM COATED ORAL at 08:12

## 2023-12-26 RX ADMIN — POTASSIUM IODIDE 5 DROP: 1 SOLUTION ORAL at 09:12

## 2023-12-26 RX ADMIN — PANTOPRAZOLE SODIUM 40 MG: 40 GRANULE, DELAYED RELEASE ORAL at 09:12

## 2023-12-26 RX ADMIN — PANTOPRAZOLE SODIUM 40 MG: 40 GRANULE, DELAYED RELEASE ORAL at 08:12

## 2023-12-26 RX ADMIN — PIPERACILLIN AND TAZOBACTAM 4.5 G: 4; .5 INJECTION, POWDER, FOR SOLUTION INTRAVENOUS; PARENTERAL at 01:12

## 2023-12-26 RX ADMIN — IPRATROPIUM BROMIDE AND ALBUTEROL SULFATE 3 ML: 2.5; .5 SOLUTION RESPIRATORY (INHALATION) at 07:12

## 2023-12-26 RX ADMIN — PIPERACILLIN AND TAZOBACTAM 4.5 G: 4; .5 INJECTION, POWDER, FOR SOLUTION INTRAVENOUS; PARENTERAL at 08:12

## 2023-12-26 RX ADMIN — LINEZOLID 600 MG: 600 TABLET, FILM COATED ORAL at 09:12

## 2023-12-26 RX ADMIN — LEVETIRACETAM 500 MG: 500 SOLUTION ORAL at 09:12

## 2023-12-26 RX ADMIN — DICLOFENAC 2 G: 10 GEL TOPICAL at 09:12

## 2023-12-26 RX ADMIN — POTASSIUM IODIDE 5 DROP: 1 SOLUTION ORAL at 05:12

## 2023-12-26 RX ADMIN — POTASSIUM IODIDE 5 DROP: 1 SOLUTION ORAL at 01:12

## 2023-12-26 RX ADMIN — APIXABAN 5 MG: 2.5 TABLET, FILM COATED ORAL at 09:12

## 2023-12-26 RX ADMIN — POTASSIUM IODIDE 5 DROP: 1 SOLUTION ORAL at 08:12

## 2023-12-26 RX ADMIN — LEVETIRACETAM 500 MG: 500 SOLUTION ORAL at 08:12

## 2023-12-26 RX ADMIN — SODIUM CHLORIDE 250 ML: 9 INJECTION, SOLUTION INTRAVENOUS at 01:12

## 2023-12-26 RX ADMIN — PIPERACILLIN AND TAZOBACTAM 4.5 G: 4; .5 INJECTION, POWDER, FOR SOLUTION INTRAVENOUS; PARENTERAL at 05:12

## 2023-12-26 NOTE — PLAN OF CARE
Problem: Adult Inpatient Plan of Care  Goal: Plan of Care Review  Outcome: Ongoing, Progressing  Goal: Patient-Specific Goal (Individualized)  Outcome: Ongoing, Progressing  Goal: Absence of Hospital-Acquired Illness or Injury  Outcome: Ongoing, Progressing  Goal: Optimal Comfort and Wellbeing  Outcome: Ongoing, Progressing     Problem: Communication Impairment (Mechanical Ventilation, Invasive)  Goal: Effective Communication  Outcome: Ongoing, Progressing     Problem: Device-Related Complication Risk (Mechanical Ventilation, Invasive)  Goal: Optimal Device Function  Outcome: Ongoing, Progressing     Problem: Inability to Wean (Mechanical Ventilation, Invasive)  Goal: Mechanical Ventilation Liberation  Outcome: Ongoing, Progressing     Problem: Nutrition Impairment (Mechanical Ventilation, Invasive)  Goal: Optimal Nutrition Delivery  Outcome: Ongoing, Progressing     Problem: Skin and Tissue Injury (Mechanical Ventilation, Invasive)  Goal: Absence of Device-Related Skin and Tissue Injury  Outcome: Ongoing, Progressing     Problem: Ventilator-Induced Lung Injury (Mechanical Ventilation, Invasive)  Goal: Absence of Ventilator-Induced Lung Injury  Outcome: Ongoing, Progressing     Problem: Communication Impairment (Artificial Airway)  Goal: Effective Communication  Outcome: Ongoing, Progressing     Problem: Device-Related Complication Risk (Artificial Airway)  Goal: Optimal Device Function  Outcome: Ongoing, Progressing     Problem: Skin and Tissue Injury (Artificial Airway)  Goal: Absence of Device-Related Skin or Tissue Injury  Outcome: Ongoing, Progressing     Problem: Fall Injury Risk  Goal: Absence of Fall and Fall-Related Injury  Outcome: Ongoing, Progressing     Problem: Skin Injury Risk Increased  Goal: Skin Health and Integrity  Outcome: Ongoing, Progressing     Problem: Impaired Wound Healing  Goal: Optimal Wound Healing  Outcome: Ongoing, Progressing     Problem: Gas Exchange Impaired  Goal: Optimal Gas  Exchange  Outcome: Ongoing, Progressing

## 2023-12-26 NOTE — CARE UPDATE
Placed pt on trach collar at 30%. Pts goal is 23 hours today. Pt tolerated transition well without any complications. Pt is stable at this time.

## 2023-12-26 NOTE — PROGRESS NOTES
Placed patient back on the vent via AVAP-AE with , respiratory rate 0, EPAP max 5, EPAP min 5, PSV max 10, PSV min 5, FIO2 30% and max pressure 20. Heart rate 102, respiratory rate 20, O2 sats 94%.  Patient did 22 hours of Trach Collar 30% and did fine with no complications.

## 2023-12-26 NOTE — PROGRESS NOTES
[facility] - [facility] Surgical Unit  Hospital Medicine  Progress Note    Patient Name: Blue Abdul  MRN: [id number]  Patient Class: IP- Swing   Admission Date: 11/9/2023  Length of Stay: 47 days  Attending Physician: Ramiro Flores MD  Primary Care Provider: Nati Doyle FNP        Subjective:     Principal Problem:Acute hypercapnic respiratory failure        HPI:  33 y-old AAM with PMH of Trisomy 21, asthma, DM, GERD, and LILLIE. Patient presented to an outside hospital system on 09/08/2023 for behavioral issues r/t medication changes. While at the hospital he experienced a hypoglycemic episode with aspiration and was coded. Patient developed new onset seizure at that time with MRI showing cerebral edema. Patient experienced difficulty with vent weaning hattie trach was placed on 09/27/2023, PEG tube placed on 10/05/2023. Patient is admitted to [facility] for continued vent weaning, PT/OT eval and treatment.     Overview/Hospital Course:  No notes on file    Interval History:  Patient without complaint    Review of Systems  Objective:     Vital Signs (Most Recent):  Temp: 98.2 °F (36.8 °C) (12/26/23 0730)  Pulse: 96 (12/26/23 0755)  Resp: 18 (12/26/23 0755)  BP: 111/68 (12/26/23 0730)  SpO2: 100 % (12/26/23 0755) Vital Signs (24h Range):  Temp:  [98.2 °F (36.8 °C)-99.1 °F (37.3 °C)] 98.2 °F (36.8 °C)  Pulse:  [] 96  Resp:  [17-28] 18  SpO2:  [92 %-100 %] 100 %  BP: ()/(52-68) 111/68     Weight: 89.1 kg (196 lb 6.4 oz)  Body mass index is 39.67 kg/m².    Intake/Output Summary (Last 24 hours) at 12/26/2023 1159  Last data filed at 12/26/2023 0612  Gross per 24 hour   Intake 2417.92 ml   Output --   Net 2417.92 ml         Physical Exam  Vitals reviewed.   Constitutional:       Appearance: Normal appearance.      Interventions: He is not intubated.  HENT:      Head: Normocephalic and atraumatic.      Nose: Nose normal.      Mouth/Throat:      Mouth: Mucous membranes are dry.       Pharynx: Oropharynx is clear.   Eyes:      Extraocular Movements: Extraocular movements intact.      Conjunctiva/sclera: Conjunctivae normal.      Pupils: Pupils are equal, round, and reactive to light.   Cardiovascular:      Rate and Rhythm: Normal rate.      Heart sounds: Normal heart sounds. No murmur heard.  Pulmonary:      Effort: Pulmonary effort is normal. He is not intubated.      Breath sounds: Normal breath sounds.   Abdominal:      General: Abdomen is flat. Bowel sounds are normal.      Palpations: Abdomen is soft.   Musculoskeletal:         General: Normal range of motion.      Cervical back: Normal range of motion and neck supple.      Right lower leg: No edema.      Left lower leg: No edema.   Skin:     General: Skin is warm and dry.      Capillary Refill: Capillary refill takes less than 2 seconds.   Neurological:      General: No focal deficit present.      Mental Status: He is alert and oriented to person, place, and time.   Psychiatric:         Mood and Affect: Mood normal.         Behavior: Behavior normal.             Significant Labs: All pertinent labs within the past 24 hours have been reviewed.  Recent Lab Results         12/26/23  0528        Anion Gap 15       Baso # 0.03       Basophil % 0.3       BUN 13       BUN/CREAT RATIO 21       Calcium 9.6       Chloride 98       CO2 28       Creatinine 0.62       Differential Method Auto       eGFR 131       Eos # 0.10       Eosinophil % 1.1       Glucose 133       Hematocrit 38.7       Hemoglobin 12.4       Lymph # 2.00       Lymph % 22.9       MCH 29.1       MCHC 32.0       MCV 90.8       Mono # 0.59       Mono % 6.8       MPV 10.0       Neutrophils, Abs 6.02       Neutrophils Relative 68.9       Platelet Count 490       Potassium 3.8       RBC 4.26       RDW 18.7       Sodium 137       WBC 8.74               Significant Imaging: I have reviewed all pertinent imaging results/findings within the past 24 hours.    Assessment/Plan:      * Acute  hypercapnic respiratory failure  Patient tolerating 24 hours of trach collar will go to a smaller size trach next week    Encephalopathy, metabolic  Seems to be improving    Muscle weakness  Start PT and OT      Seizures  MRI showed encephalopathy  Keppra  Ativan PRN      Diabetes mellitus  Currently good control with sliding scale      VTE Risk Mitigation (From admission, onward)           Ordered     apixaban tablet 5 mg  2 times daily         11/09/23 2008     IP VTE HIGH RISK PATIENT  Once         11/09/23 1641     Place NORMA hose  Until discontinued         11/09/23 1641     Place sequential compression device  Until discontinued         11/09/23 1641                    Discharge Planning   CRICKET:      Code Status: Full Code   Is the patient medically ready for discharge?:     Reason for patient still in hospital (select all that apply): Patient trending condition, Laboratory test, Treatment, Imaging, and Consult recommendations  Discharge Plan A: Home with family, Home Health   Discharge Delays: None known at this time              Ramiro Flores MD  Department of Hospital Medicine   [facility] - [facility] Unit

## 2023-12-26 NOTE — SUBJECTIVE & OBJECTIVE
Heather states she her mother is in [place] right now. She states her and her sister have noticed that she is more short of breath and wheezy. She states she is not sick at all . She denies a cough or fever. She denies any chest pain. She uses the excuse that she is wheezy due to the extra weight she is carrying. Heather does feels she has gained weight and she has joined [employer] again. She is concerned about her and would like her to see Dr Stewart when she gets back. She feels her memory is not as good as it once was.She will be leaving for [place] in a couple of weeks. She would like to get her in to see Dr Stewart on 2/22/18 if possible before she leaves again on vacation. Informed the message would be sent to Dr Stewart for a possible appointment time.   Interval History:  Patient without complaint    Review of Systems  Objective:     Vital Signs (Most Recent):  Temp: 98.2 °F (36.8 °C) (12/26/23 0730)  Pulse: 96 (12/26/23 0755)  Resp: 18 (12/26/23 0755)  BP: 111/68 (12/26/23 0730)  SpO2: 100 % (12/26/23 0755) Vital Signs (24h Range):  Temp:  [98.2 °F (36.8 °C)-99.1 °F (37.3 °C)] 98.2 °F (36.8 °C)  Pulse:  [] 96  Resp:  [17-28] 18  SpO2:  [92 %-100 %] 100 %  BP: ()/(52-68) 111/68     Weight: 89.1 kg (196 lb 6.4 oz)  Body mass index is 39.67 kg/m².    Intake/Output Summary (Last 24 hours) at 12/26/2023 1159  Last data filed at 12/26/2023 0612  Gross per 24 hour   Intake 2417.92 ml   Output --   Net 2417.92 ml         Physical Exam  Vitals reviewed.   Constitutional:       Appearance: Normal appearance.      Interventions: He is not intubated.  HENT:      Head: Normocephalic and atraumatic.      Nose: Nose normal.      Mouth/Throat:      Mouth: Mucous membranes are dry.      Pharynx: Oropharynx is clear.   Eyes:      Extraocular Movements: Extraocular movements intact.      Conjunctiva/sclera: Conjunctivae normal.      Pupils: Pupils are equal, round, and reactive to light.   Cardiovascular:      Rate and Rhythm: Normal rate.      Heart sounds: Normal heart sounds. No murmur heard.  Pulmonary:      Effort: Pulmonary effort is normal. He is not intubated.      Breath sounds: Normal breath sounds.   Abdominal:      General: Abdomen is flat. Bowel sounds are normal.      Palpations: Abdomen is soft.   Musculoskeletal:         General: Normal range of motion.      Cervical back: Normal range of motion and neck supple.      Right lower leg: No edema.      Left lower leg: No edema.   Skin:     General: Skin is warm and dry.      Capillary Refill: Capillary refill takes less than 2 seconds.   Neurological:      General: No focal deficit present.      Mental Status: He is alert and oriented to person, place, and time.   Psychiatric:         Mood and Affect: Mood  normal.         Behavior: Behavior normal.             Significant Labs: All pertinent labs within the past 24 hours have been reviewed.  Recent Lab Results         12/26/23  0528        Anion Gap 15       Baso # 0.03       Basophil % 0.3       BUN 13       BUN/CREAT RATIO 21       Calcium 9.6       Chloride 98       CO2 28       Creatinine 0.62       Differential Method Auto       eGFR 131       Eos # 0.10       Eosinophil % 1.1       Glucose 133       Hematocrit 38.7       Hemoglobin 12.4       Lymph # 2.00       Lymph % 22.9       MCH 29.1       MCHC 32.0       MCV 90.8       Mono # 0.59       Mono % 6.8       MPV 10.0       Neutrophils, Abs 6.02       Neutrophils Relative 68.9       Platelet Count 490       Potassium 3.8       RBC 4.26       RDW 18.7       Sodium 137       WBC 8.74               Significant Imaging: I have reviewed all pertinent imaging results/findings within the past 24 hours.

## 2023-12-27 LAB — GLUCOSE SERPL-MCNC: 147 MG/DL (ref 70–105)

## 2023-12-27 PROCEDURE — 11000004 HC SNF PRIVATE

## 2023-12-27 PROCEDURE — 82962 GLUCOSE BLOOD TEST: CPT

## 2023-12-27 PROCEDURE — 25000003 PHARM REV CODE 250: Performed by: INTERNAL MEDICINE

## 2023-12-27 PROCEDURE — 27000941

## 2023-12-27 PROCEDURE — 27000935

## 2023-12-27 PROCEDURE — 25000242 PHARM REV CODE 250 ALT 637 W/ HCPCS: Performed by: INTERNAL MEDICINE

## 2023-12-27 PROCEDURE — 99900026 HC AIRWAY MAINTENANCE (STAT)

## 2023-12-27 PROCEDURE — 94640 AIRWAY INHALATION TREATMENT: CPT

## 2023-12-27 PROCEDURE — 25000003 PHARM REV CODE 250: Performed by: REGISTERED NURSE

## 2023-12-27 PROCEDURE — 27000221 HC OXYGEN, UP TO 24 HOURS

## 2023-12-27 PROCEDURE — 63600175 PHARM REV CODE 636 W HCPCS: Performed by: INTERNAL MEDICINE

## 2023-12-27 PROCEDURE — 99900035 HC TECH TIME PER 15 MIN (STAT)

## 2023-12-27 PROCEDURE — 94761 N-INVAS EAR/PLS OXIMETRY MLT: CPT

## 2023-12-27 RX ADMIN — PREDNISONE 20 MG: 20 TABLET ORAL at 08:12

## 2023-12-27 RX ADMIN — APIXABAN 5 MG: 2.5 TABLET, FILM COATED ORAL at 08:12

## 2023-12-27 RX ADMIN — SODIUM CHLORIDE 250 ML: 9 INJECTION, SOLUTION INTRAVENOUS at 05:12

## 2023-12-27 RX ADMIN — POTASSIUM IODIDE 5 DROP: 1 SOLUTION ORAL at 08:12

## 2023-12-27 RX ADMIN — LEVETIRACETAM 500 MG: 500 SOLUTION ORAL at 08:12

## 2023-12-27 RX ADMIN — PIPERACILLIN AND TAZOBACTAM 4.5 G: 4; .5 INJECTION, POWDER, FOR SOLUTION INTRAVENOUS; PARENTERAL at 08:12

## 2023-12-27 RX ADMIN — POTASSIUM IODIDE 5 DROP: 1 SOLUTION ORAL at 05:12

## 2023-12-27 RX ADMIN — PANTOPRAZOLE SODIUM 40 MG: 40 GRANULE, DELAYED RELEASE ORAL at 08:12

## 2023-12-27 RX ADMIN — POTASSIUM IODIDE 5 DROP: 1 SOLUTION ORAL at 01:12

## 2023-12-27 RX ADMIN — BUDESONIDE 0.5 MG: 0.5 INHALANT ORAL at 07:12

## 2023-12-27 RX ADMIN — PIPERACILLIN AND TAZOBACTAM 4.5 G: 4; .5 INJECTION, POWDER, FOR SOLUTION INTRAVENOUS; PARENTERAL at 05:12

## 2023-12-27 RX ADMIN — IPRATROPIUM BROMIDE AND ALBUTEROL SULFATE 3 ML: 2.5; .5 SOLUTION RESPIRATORY (INHALATION) at 07:12

## 2023-12-27 RX ADMIN — PIPERACILLIN AND TAZOBACTAM 4.5 G: 4; .5 INJECTION, POWDER, FOR SOLUTION INTRAVENOUS; PARENTERAL at 01:12

## 2023-12-27 NOTE — PLAN OF CARE
Problem: Communication Impairment (Mechanical Ventilation, Invasive)  Goal: Effective Communication  Outcome: Ongoing, Progressing     Problem: Device-Related Complication Risk (Mechanical Ventilation, Invasive)  Goal: Optimal Device Function  Outcome: Ongoing, Progressing     Problem: Inability to Wean (Mechanical Ventilation, Invasive)  Goal: Mechanical Ventilation Liberation  Outcome: Ongoing, Progressing     Problem: Nutrition Impairment (Mechanical Ventilation, Invasive)  Goal: Optimal Nutrition Delivery  Outcome: Ongoing, Progressing     Problem: Ventilator-Induced Lung Injury (Mechanical Ventilation, Invasive)  Goal: Absence of Ventilator-Induced Lung Injury  Outcome: Ongoing, Progressing     Problem: Communication Impairment (Artificial Airway)  Goal: Effective Communication  Outcome: Ongoing, Progressing     Problem: Device-Related Complication Risk (Artificial Airway)  Goal: Optimal Device Function  Outcome: Ongoing, Progressing     Problem: Skin and Tissue Injury (Artificial Airway)  Goal: Absence of Device-Related Skin or Tissue Injury  Outcome: Ongoing, Progressing     Problem: Noninvasive Ventilation Acute  Goal: Effective Unassisted Ventilation and Oxygenation  Outcome: Ongoing, Progressing     Problem: Gas Exchange Impaired  Goal: Optimal Gas Exchange  Outcome: Ongoing, Progressing

## 2023-12-27 NOTE — PLAN OF CARE
[facility]  Discharge Reassessment    Primary Care Provider: Nati Doyle FNP    Expected Discharge Date:     Reassessment (most recent)       Discharge Reassessment - 12/27/23 1104          Discharge Reassessment    Assessment Type Discharge Planning Brief Assessment     Did the patient's condition or plan change since previous assessment? No     Discharge Plan discussed with: Parent(s)     Name(s) and Number(s) Linn     Communicated CRICKET with patient/caregiver Date not available/Unable to determine     Discharge Plan A Home with family;Home Health     DME Needed Upon Discharge  feeding device;nutrition supplies;lift device;oxygen;respiratory supplies;suction machine;urinary catheter supplies;ventilator     Transition of Care Barriers None     Why the patient remains in the hospital Requires continued medical care        Post-Acute Status    Post-Acute Authorization Home Health     Patient choice form signed by patient/caregiver List with quality metrics by geographic area provided     Discharge Delays None known at this time                     Cont to follow for assistance with discharge planning , which is to return home with mother

## 2023-12-27 NOTE — PLAN OF CARE
Problem: Communication Impairment (Mechanical Ventilation, Invasive)  Goal: Effective Communication  Outcome: Ongoing, Progressing     Problem: Device-Related Complication Risk (Mechanical Ventilation, Invasive)  Goal: Optimal Device Function  Outcome: Ongoing, Progressing     Problem: Inability to Wean (Mechanical Ventilation, Invasive)  Goal: Mechanical Ventilation Liberation  Outcome: Ongoing, Progressing     Problem: Communication Impairment (Artificial Airway)  Goal: Effective Communication  Outcome: Ongoing, Progressing     Problem: Noninvasive Ventilation Acute  Goal: Effective Unassisted Ventilation and Oxygenation  Outcome: Ongoing, Progressing     Problem: Gas Exchange Impaired  Goal: Optimal Gas Exchange  Outcome: Ongoing, Progressing

## 2023-12-27 NOTE — PROGRESS NOTES
Wt: 194#  No problems tolerating TF.  TF Peptamen Af @50ml with 35ml water flush every hour.  Pt is now doing 24 TC at 30%.  Plan is to downsize pt's trach next week. Pt is not following commands and may not be able to have the appropriate positioning for Bolus feedings even if trach is removed.  B-171, Last BM . Lab reviewed.  Continue NS.

## 2023-12-28 LAB — GLUCOSE SERPL-MCNC: 166 MG/DL (ref 70–105)

## 2023-12-28 PROCEDURE — 27200966 HC CLOSED SUCTION SYSTEM

## 2023-12-28 PROCEDURE — 25000242 PHARM REV CODE 250 ALT 637 W/ HCPCS: Performed by: INTERNAL MEDICINE

## 2023-12-28 PROCEDURE — 27000935

## 2023-12-28 PROCEDURE — 63600175 PHARM REV CODE 636 W HCPCS: Performed by: INTERNAL MEDICINE

## 2023-12-28 PROCEDURE — 11000004 HC SNF PRIVATE

## 2023-12-28 PROCEDURE — 99900035 HC TECH TIME PER 15 MIN (STAT)

## 2023-12-28 PROCEDURE — 27000958

## 2023-12-28 PROCEDURE — 27000221 HC OXYGEN, UP TO 24 HOURS

## 2023-12-28 PROCEDURE — 94640 AIRWAY INHALATION TREATMENT: CPT

## 2023-12-28 PROCEDURE — 94761 N-INVAS EAR/PLS OXIMETRY MLT: CPT

## 2023-12-28 PROCEDURE — 25000003 PHARM REV CODE 250: Performed by: REGISTERED NURSE

## 2023-12-28 PROCEDURE — 99900026 HC AIRWAY MAINTENANCE (STAT)

## 2023-12-28 PROCEDURE — 25000003 PHARM REV CODE 250: Performed by: INTERNAL MEDICINE

## 2023-12-28 PROCEDURE — 82962 GLUCOSE BLOOD TEST: CPT

## 2023-12-28 RX ADMIN — LEVETIRACETAM 500 MG: 500 SOLUTION ORAL at 08:12

## 2023-12-28 RX ADMIN — APIXABAN 5 MG: 2.5 TABLET, FILM COATED ORAL at 08:12

## 2023-12-28 RX ADMIN — POTASSIUM IODIDE 5 DROP: 1 SOLUTION ORAL at 08:12

## 2023-12-28 RX ADMIN — PREDNISONE 20 MG: 20 TABLET ORAL at 08:12

## 2023-12-28 RX ADMIN — PANTOPRAZOLE SODIUM 40 MG: 40 GRANULE, DELAYED RELEASE ORAL at 08:12

## 2023-12-28 RX ADMIN — POTASSIUM IODIDE 5 DROP: 1 SOLUTION ORAL at 01:12

## 2023-12-28 RX ADMIN — IPRATROPIUM BROMIDE AND ALBUTEROL SULFATE 3 ML: 2.5; .5 SOLUTION RESPIRATORY (INHALATION) at 07:12

## 2023-12-28 RX ADMIN — POTASSIUM IODIDE 5 DROP: 1 SOLUTION ORAL at 05:12

## 2023-12-28 RX ADMIN — BUDESONIDE 0.5 MG: 0.5 INHALANT ORAL at 07:12

## 2023-12-28 RX ADMIN — PIPERACILLIN AND TAZOBACTAM 4.5 G: 4; .5 INJECTION, POWDER, FOR SOLUTION INTRAVENOUS; PARENTERAL at 05:12

## 2023-12-28 RX ADMIN — PIPERACILLIN AND TAZOBACTAM 4.5 G: 4; .5 INJECTION, POWDER, FOR SOLUTION INTRAVENOUS; PARENTERAL at 01:12

## 2023-12-28 NOTE — PLAN OF CARE
Problem: Breathing Pattern Ineffective  Goal: Effective Breathing Pattern  Outcome: Ongoing, Progressing     Problem: Airway Clearance Ineffective  Goal: Effective Airway Clearance  Outcome: Ongoing, Progressing

## 2023-12-28 NOTE — PLAN OF CARE
Problem: Skin Injury Risk Increased  Goal: Skin Health and Integrity  Outcome: Ongoing, Progressing

## 2023-12-28 NOTE — PLAN OF CARE
Weekly Swing Bed Note    Patient is on trach collar at 30%.  Patient's sat this morning was 92% to 94% on o2. BBS coarse.

## 2023-12-28 NOTE — CARE UPDATE
[facility] - Medical Surgical Unit - Swing Bed   Interdisciplinary Team Meeting    Patient: Blue Abdul   Today's Date: 12/28/2023   Estimated D/C Date:         Physician: Ramiro Flores MD Nurse Practitioner:  nida   Pharmacy:  [facility] Unit Director: Holly Carrillo   : Anita Krause Physical/Occupational Therapy: Jadon Mark   Speech Therapy:  Monica Anderson Activity Therapy: nida   Nursing: Holly Carrillo  Respiratory: Tona Beckwith Dietary: Kuldeep Boggs  Other: na     Nurse  New Symptoms/Problems: na  Last Bowel Movement: 12/28/23 (reported by night shift)   Urine: incontinent  Rojas: No  Bowel: incontinent   Constipated: No  Diarrhea: No   Isolation: Yes  Wound Care: Yes  Wound Location/Tx: as directed  Cognition: patient unable to participate  Aspiration Precautions: Yes  Comment(s): na    Respiratory   O2 Device: Trach Collar  O2 Flow: 30%  SpO2: 96%  Neb Tx: No  Comment(s): na     Dietary  Nutrition:  peptamin AF at 50 cc/hr  Comment(s): NIDA    Speech Therapy  Speech/Swallowing: No current speech or swallowing issues  Comment(s): na    Physical Therapy  Gait/Assistive Device: 0 ELOS: Plan to DC     Transfers: Activity did not occur  Bed Mobility: Total Assistance Range of Motion/Restrictions: na  Comment(s): nida     Occupational Therapy  Eating/Grooming: Total Assistance Toileting: Total Assistance   Bathing: Total Assistance Dressing (Upper Body): Total Assistance   Dressing (Lower Body): Total Assistance Comment(s): nida     Pharmacy  Medication Changes (see all MD orders in chart): No  Labs Reviewed: Yes  New Lab Orders: No  Comment(s): completed zyvoxx for staf in trach and sputim and ZOSYN FOR PNEUMONIA KLEBSIELLA IN SPUTUM      Tx Plan/Recommendations reviewed with family and/or patient on (date) UPDATED ENA WEEKLY.  Additional family Conference/Training: NIDA  D/C Plan/Recommendations: Home with family  CRICKET:   Comment(s): AND HH OF CHOICE

## 2023-12-29 LAB — GLUCOSE SERPL-MCNC: 145 MG/DL (ref 70–105)

## 2023-12-29 PROCEDURE — 94640 AIRWAY INHALATION TREATMENT: CPT

## 2023-12-29 PROCEDURE — 82962 GLUCOSE BLOOD TEST: CPT

## 2023-12-29 PROCEDURE — 94761 N-INVAS EAR/PLS OXIMETRY MLT: CPT

## 2023-12-29 PROCEDURE — 99900026 HC AIRWAY MAINTENANCE (STAT)

## 2023-12-29 PROCEDURE — 25000003 PHARM REV CODE 250: Performed by: INTERNAL MEDICINE

## 2023-12-29 PROCEDURE — 27000221 HC OXYGEN, UP TO 24 HOURS

## 2023-12-29 PROCEDURE — A6212 FOAM DRG <=16 SQ IN W/BORDER: HCPCS

## 2023-12-29 PROCEDURE — 63600175 PHARM REV CODE 636 W HCPCS: Performed by: INTERNAL MEDICINE

## 2023-12-29 PROCEDURE — 27000716 HC OXISENSOR PROBE, ANY SIZE

## 2023-12-29 PROCEDURE — 27000935

## 2023-12-29 PROCEDURE — 27000958

## 2023-12-29 PROCEDURE — 99900035 HC TECH TIME PER 15 MIN (STAT)

## 2023-12-29 PROCEDURE — 25000003 PHARM REV CODE 250: Performed by: REGISTERED NURSE

## 2023-12-29 PROCEDURE — 11000004 HC SNF PRIVATE

## 2023-12-29 PROCEDURE — 25000242 PHARM REV CODE 250 ALT 637 W/ HCPCS: Performed by: INTERNAL MEDICINE

## 2023-12-29 RX ADMIN — POTASSIUM IODIDE 5 DROP: 1 SOLUTION ORAL at 05:12

## 2023-12-29 RX ADMIN — LEVETIRACETAM 500 MG: 500 SOLUTION ORAL at 09:12

## 2023-12-29 RX ADMIN — BUDESONIDE 0.5 MG: 0.5 INHALANT ORAL at 07:12

## 2023-12-29 RX ADMIN — PANTOPRAZOLE SODIUM 40 MG: 40 GRANULE, DELAYED RELEASE ORAL at 09:12

## 2023-12-29 RX ADMIN — APIXABAN 5 MG: 2.5 TABLET, FILM COATED ORAL at 09:12

## 2023-12-29 RX ADMIN — POTASSIUM IODIDE 5 DROP: 1 SOLUTION ORAL at 01:12

## 2023-12-29 RX ADMIN — IPRATROPIUM BROMIDE AND ALBUTEROL SULFATE 3 ML: 2.5; .5 SOLUTION RESPIRATORY (INHALATION) at 07:12

## 2023-12-29 RX ADMIN — APIXABAN 5 MG: 2.5 TABLET, FILM COATED ORAL at 08:12

## 2023-12-29 RX ADMIN — LEVETIRACETAM 500 MG: 500 SOLUTION ORAL at 08:12

## 2023-12-29 RX ADMIN — POTASSIUM IODIDE 5 DROP: 1 SOLUTION ORAL at 08:12

## 2023-12-29 RX ADMIN — PANTOPRAZOLE SODIUM 40 MG: 40 GRANULE, DELAYED RELEASE ORAL at 08:12

## 2023-12-29 RX ADMIN — PREDNISONE 20 MG: 20 TABLET ORAL at 09:12

## 2023-12-29 RX ADMIN — POTASSIUM IODIDE 5 DROP: 1 SOLUTION ORAL at 09:12

## 2023-12-29 RX ADMIN — DICLOFENAC 2 G: 10 GEL TOPICAL at 09:12

## 2023-12-30 LAB — GLUCOSE SERPL-MCNC: 191 MG/DL (ref 70–105)

## 2023-12-30 PROCEDURE — 94761 N-INVAS EAR/PLS OXIMETRY MLT: CPT

## 2023-12-30 PROCEDURE — 25000003 PHARM REV CODE 250: Performed by: INTERNAL MEDICINE

## 2023-12-30 PROCEDURE — 25000003 PHARM REV CODE 250: Performed by: REGISTERED NURSE

## 2023-12-30 PROCEDURE — 27000935

## 2023-12-30 PROCEDURE — 82962 GLUCOSE BLOOD TEST: CPT

## 2023-12-30 PROCEDURE — 27000941

## 2023-12-30 PROCEDURE — 94003 VENT MGMT INPAT SUBQ DAY: CPT

## 2023-12-30 PROCEDURE — 11000004 HC SNF PRIVATE

## 2023-12-30 PROCEDURE — 99900035 HC TECH TIME PER 15 MIN (STAT)

## 2023-12-30 PROCEDURE — 63600175 PHARM REV CODE 636 W HCPCS: Performed by: INTERNAL MEDICINE

## 2023-12-30 PROCEDURE — 27000221 HC OXYGEN, UP TO 24 HOURS

## 2023-12-30 PROCEDURE — 25000242 PHARM REV CODE 250 ALT 637 W/ HCPCS: Performed by: INTERNAL MEDICINE

## 2023-12-30 PROCEDURE — 99900026 HC AIRWAY MAINTENANCE (STAT)

## 2023-12-30 PROCEDURE — 94640 AIRWAY INHALATION TREATMENT: CPT

## 2023-12-30 RX ORDER — POLYETHYLENE GLYCOL 3350 17 G/17G
17 POWDER, FOR SOLUTION ORAL DAILY PRN
Status: DISCONTINUED | OUTPATIENT
Start: 2023-12-30 | End: 2024-01-31 | Stop reason: HOSPADM

## 2023-12-30 RX ADMIN — PANTOPRAZOLE SODIUM 40 MG: 40 GRANULE, DELAYED RELEASE ORAL at 09:12

## 2023-12-30 RX ADMIN — IPRATROPIUM BROMIDE AND ALBUTEROL SULFATE 3 ML: 2.5; .5 SOLUTION RESPIRATORY (INHALATION) at 07:12

## 2023-12-30 RX ADMIN — BUDESONIDE 0.5 MG: 0.5 INHALANT ORAL at 07:12

## 2023-12-30 RX ADMIN — PREDNISONE 20 MG: 20 TABLET ORAL at 09:12

## 2023-12-30 RX ADMIN — LEVETIRACETAM 500 MG: 500 SOLUTION ORAL at 09:12

## 2023-12-30 RX ADMIN — ONDANSETRON HYDROCHLORIDE 4 MG: 4 TABLET, FILM COATED ORAL at 05:12

## 2023-12-30 RX ADMIN — POTASSIUM IODIDE 5 DROP: 1 SOLUTION ORAL at 08:12

## 2023-12-30 RX ADMIN — APIXABAN 5 MG: 2.5 TABLET, FILM COATED ORAL at 08:12

## 2023-12-30 RX ADMIN — POTASSIUM IODIDE 5 DROP: 1 SOLUTION ORAL at 09:12

## 2023-12-30 RX ADMIN — PANTOPRAZOLE SODIUM 40 MG: 40 GRANULE, DELAYED RELEASE ORAL at 08:12

## 2023-12-30 RX ADMIN — APIXABAN 5 MG: 2.5 TABLET, FILM COATED ORAL at 09:12

## 2023-12-30 RX ADMIN — POTASSIUM IODIDE 5 DROP: 1 SOLUTION ORAL at 01:12

## 2023-12-30 RX ADMIN — POTASSIUM IODIDE 5 DROP: 1 SOLUTION ORAL at 05:12

## 2023-12-30 RX ADMIN — LEVETIRACETAM 500 MG: 500 SOLUTION ORAL at 08:12

## 2023-12-30 RX ADMIN — POLYETHYLENE GLYCOL 3350 17 G: 17 POWDER, FOR SOLUTION ORAL at 08:12

## 2023-12-30 RX ADMIN — DICLOFENAC 2 G: 10 GEL TOPICAL at 09:12

## 2023-12-30 NOTE — PLAN OF CARE
Problem: Communication Impairment (Mechanical Ventilation, Invasive)  Goal: Effective Communication  Outcome: Ongoing, Progressing     Problem: Inability to Wean (Mechanical Ventilation, Invasive)  Goal: Mechanical Ventilation Liberation  Outcome: Ongoing, Progressing     Problem: Communication Impairment (Artificial Airway)  Goal: Effective Communication  Outcome: Ongoing, Progressing     Problem: Gas Exchange Impaired  Goal: Optimal Gas Exchange  Outcome: Ongoing, Progressing

## 2023-12-31 LAB — GLUCOSE SERPL-MCNC: 166 MG/DL (ref 70–105)

## 2023-12-31 PROCEDURE — 25000003 PHARM REV CODE 250: Performed by: INTERNAL MEDICINE

## 2023-12-31 PROCEDURE — 11000004 HC SNF PRIVATE

## 2023-12-31 PROCEDURE — 82962 GLUCOSE BLOOD TEST: CPT

## 2023-12-31 PROCEDURE — 94640 AIRWAY INHALATION TREATMENT: CPT

## 2023-12-31 PROCEDURE — 94660 CPAP INITIATION&MGMT: CPT

## 2023-12-31 PROCEDURE — 99900035 HC TECH TIME PER 15 MIN (STAT)

## 2023-12-31 PROCEDURE — 25000003 PHARM REV CODE 250: Performed by: REGISTERED NURSE

## 2023-12-31 PROCEDURE — 25000242 PHARM REV CODE 250 ALT 637 W/ HCPCS: Performed by: INTERNAL MEDICINE

## 2023-12-31 PROCEDURE — 94761 N-INVAS EAR/PLS OXIMETRY MLT: CPT

## 2023-12-31 PROCEDURE — 27200966 HC CLOSED SUCTION SYSTEM

## 2023-12-31 PROCEDURE — 27000935

## 2023-12-31 PROCEDURE — 27000941

## 2023-12-31 PROCEDURE — 99900026 HC AIRWAY MAINTENANCE (STAT)

## 2023-12-31 PROCEDURE — 63600175 PHARM REV CODE 636 W HCPCS: Performed by: INTERNAL MEDICINE

## 2023-12-31 PROCEDURE — 27000221 HC OXYGEN, UP TO 24 HOURS

## 2023-12-31 RX ADMIN — POLYETHYLENE GLYCOL 3350 17 G: 17 POWDER, FOR SOLUTION ORAL at 08:12

## 2023-12-31 RX ADMIN — LEVETIRACETAM 500 MG: 500 SOLUTION ORAL at 08:12

## 2023-12-31 RX ADMIN — PREDNISONE 20 MG: 20 TABLET ORAL at 09:12

## 2023-12-31 RX ADMIN — PANTOPRAZOLE SODIUM 40 MG: 40 GRANULE, DELAYED RELEASE ORAL at 09:12

## 2023-12-31 RX ADMIN — BUDESONIDE 0.5 MG: 0.5 INHALANT ORAL at 07:12

## 2023-12-31 RX ADMIN — IPRATROPIUM BROMIDE AND ALBUTEROL SULFATE 3 ML: 2.5; .5 SOLUTION RESPIRATORY (INHALATION) at 08:12

## 2023-12-31 RX ADMIN — APIXABAN 5 MG: 2.5 TABLET, FILM COATED ORAL at 08:12

## 2023-12-31 RX ADMIN — APIXABAN 5 MG: 2.5 TABLET, FILM COATED ORAL at 09:12

## 2023-12-31 RX ADMIN — PANTOPRAZOLE SODIUM 40 MG: 40 GRANULE, DELAYED RELEASE ORAL at 08:12

## 2023-12-31 RX ADMIN — POTASSIUM IODIDE 5 DROP: 1 SOLUTION ORAL at 01:12

## 2023-12-31 RX ADMIN — BUDESONIDE 0.5 MG: 0.5 INHALANT ORAL at 08:12

## 2023-12-31 RX ADMIN — POTASSIUM IODIDE 5 DROP: 1 SOLUTION ORAL at 05:12

## 2023-12-31 RX ADMIN — POTASSIUM IODIDE 5 DROP: 1 SOLUTION ORAL at 08:12

## 2023-12-31 RX ADMIN — IPRATROPIUM BROMIDE AND ALBUTEROL SULFATE 3 ML: 2.5; .5 SOLUTION RESPIRATORY (INHALATION) at 07:12

## 2023-12-31 RX ADMIN — POTASSIUM IODIDE 5 DROP: 1 SOLUTION ORAL at 09:12

## 2023-12-31 RX ADMIN — LEVETIRACETAM 500 MG: 500 SOLUTION ORAL at 09:12

## 2023-12-31 NOTE — PLAN OF CARE
Problem: Adult Inpatient Plan of Care  Goal: Plan of Care Review  Outcome: Ongoing, Progressing  Goal: Patient-Specific Goal (Individualized)  Outcome: Ongoing, Progressing  Goal: Absence of Hospital-Acquired Illness or Injury  Outcome: Ongoing, Progressing  Goal: Optimal Comfort and Wellbeing  Outcome: Ongoing, Progressing     Problem: Communication Impairment (Mechanical Ventilation, Invasive)  Goal: Effective Communication  Outcome: Ongoing, Progressing     Problem: Device-Related Complication Risk (Mechanical Ventilation, Invasive)  Goal: Optimal Device Function  Outcome: Ongoing, Progressing     Problem: Inability to Wean (Mechanical Ventilation, Invasive)  Goal: Mechanical Ventilation Liberation  Outcome: Ongoing, Progressing     Problem: Nutrition Impairment (Mechanical Ventilation, Invasive)  Goal: Optimal Nutrition Delivery  Outcome: Ongoing, Progressing     Problem: Skin and Tissue Injury (Mechanical Ventilation, Invasive)  Goal: Absence of Device-Related Skin and Tissue Injury  Outcome: Ongoing, Progressing     Problem: Ventilator-Induced Lung Injury (Mechanical Ventilation, Invasive)  Goal: Absence of Ventilator-Induced Lung Injury  Outcome: Ongoing, Progressing     Problem: Communication Impairment (Artificial Airway)  Goal: Effective Communication  Outcome: Ongoing, Progressing     Problem: Device-Related Complication Risk (Artificial Airway)  Goal: Optimal Device Function  Outcome: Ongoing, Progressing     Problem: Skin and Tissue Injury (Artificial Airway)  Goal: Absence of Device-Related Skin or Tissue Injury  Outcome: Ongoing, Progressing     Problem: Fall Injury Risk  Goal: Absence of Fall and Fall-Related Injury  Outcome: Ongoing, Progressing     Problem: Skin Injury Risk Increased  Goal: Skin Health and Integrity  Outcome: Ongoing, Progressing     Problem: Impaired Wound Healing  Goal: Optimal Wound Healing  Outcome: Ongoing, Progressing     Problem: Gas Exchange Impaired  Goal: Optimal Gas  Exchange  Outcome: Ongoing, Progressing     Problem: Breathing Pattern Ineffective  Goal: Effective Breathing Pattern  Outcome: Ongoing, Progressing     Problem: Airway Clearance Ineffective  Goal: Effective Airway Clearance  Outcome: Ongoing, Progressing

## 2023-12-31 NOTE — PLAN OF CARE
Problem: Bariatric Environmental Safety  Goal: Safety Maintained with Care  Outcome: Ongoing, Progressing     Problem: Gas Exchange Impaired  Goal: Optimal Gas Exchange  Outcome: Ongoing, Progressing     Problem: Breathing Pattern Ineffective  Goal: Effective Breathing Pattern  Outcome: Ongoing, Progressing     Problem: Airway Clearance Ineffective  Goal: Effective Airway Clearance  Outcome: Ongoing, Progressing

## 2023-12-31 NOTE — PROGRESS NOTES
Inflated patient's trach cuff with sterile water using MLT method and placed patient on CPAP 10 with 30%.  Patient's mother wants patient to do CPAP from [id number].  She stated that she doesn't think that patient should be on CPAP for long time periods.  Patient is doing fine at this time.  Will continue to monitor patient closely.

## 2023-12-31 NOTE — NURSING
Patient spit up small amt of white emesis, treated with zofran. No residual. HOB elevated and will monitor

## 2023-12-31 NOTE — PROGRESS NOTES
Deflated patient's trach cuff and placed patient on Trach Collar 40%.  Heart rate 80, respiratory rate 18, O2 sats 98%.  Patient respirations are even, easy and unlabored at this time.  Patient tolerating Trach Collar well.

## 2024-01-01 LAB — GLUCOSE SERPL-MCNC: 134 MG/DL (ref 70–105)

## 2024-01-01 PROCEDURE — 99900026 HC AIRWAY MAINTENANCE (STAT)

## 2024-01-01 PROCEDURE — 94761 N-INVAS EAR/PLS OXIMETRY MLT: CPT

## 2024-01-01 PROCEDURE — 25000242 PHARM REV CODE 250 ALT 637 W/ HCPCS: Performed by: INTERNAL MEDICINE

## 2024-01-01 PROCEDURE — 63600175 PHARM REV CODE 636 W HCPCS: Performed by: INTERNAL MEDICINE

## 2024-01-01 PROCEDURE — 27000221 HC OXYGEN, UP TO 24 HOURS

## 2024-01-01 PROCEDURE — 99900035 HC TECH TIME PER 15 MIN (STAT)

## 2024-01-01 PROCEDURE — 25000003 PHARM REV CODE 250: Performed by: REGISTERED NURSE

## 2024-01-01 PROCEDURE — 27000935

## 2024-01-01 PROCEDURE — 27000941

## 2024-01-01 PROCEDURE — 25000003 PHARM REV CODE 250: Performed by: INTERNAL MEDICINE

## 2024-01-01 PROCEDURE — 11000004 HC SNF PRIVATE

## 2024-01-01 PROCEDURE — 82962 GLUCOSE BLOOD TEST: CPT

## 2024-01-01 PROCEDURE — 94640 AIRWAY INHALATION TREATMENT: CPT

## 2024-01-01 RX ADMIN — POTASSIUM IODIDE 5 DROP: 1 SOLUTION ORAL at 09:01

## 2024-01-01 RX ADMIN — APIXABAN 5 MG: 2.5 TABLET, FILM COATED ORAL at 09:01

## 2024-01-01 RX ADMIN — PANTOPRAZOLE SODIUM 40 MG: 40 GRANULE, DELAYED RELEASE ORAL at 09:01

## 2024-01-01 RX ADMIN — APIXABAN 5 MG: 2.5 TABLET, FILM COATED ORAL at 08:01

## 2024-01-01 RX ADMIN — IPRATROPIUM BROMIDE AND ALBUTEROL SULFATE 3 ML: 2.5; .5 SOLUTION RESPIRATORY (INHALATION) at 07:01

## 2024-01-01 RX ADMIN — POTASSIUM IODIDE 5 DROP: 1 SOLUTION ORAL at 08:01

## 2024-01-01 RX ADMIN — POTASSIUM IODIDE 5 DROP: 1 SOLUTION ORAL at 05:01

## 2024-01-01 RX ADMIN — POTASSIUM IODIDE 5 DROP: 1 SOLUTION ORAL at 01:01

## 2024-01-01 RX ADMIN — BUDESONIDE 0.5 MG: 0.5 INHALANT ORAL at 07:01

## 2024-01-01 RX ADMIN — PREDNISONE 20 MG: 20 TABLET ORAL at 09:01

## 2024-01-01 RX ADMIN — PANTOPRAZOLE SODIUM 40 MG: 40 GRANULE, DELAYED RELEASE ORAL at 08:01

## 2024-01-01 RX ADMIN — LEVETIRACETAM 500 MG: 500 SOLUTION ORAL at 08:01

## 2024-01-01 RX ADMIN — LEVETIRACETAM 500 MG: 500 SOLUTION ORAL at 09:01

## 2024-01-02 LAB
ANION GAP SERPL CALCULATED.3IONS-SCNC: 13 MMOL/L (ref 7–16)
BASOPHILS # BLD AUTO: 0.04 K/UL (ref 0–0.2)
BASOPHILS NFR BLD AUTO: 0.5 % (ref 0–1)
BUN SERPL-MCNC: 13 MG/DL (ref 7–18)
BUN/CREAT SERPL: 21 (ref 6–20)
CALCIUM SERPL-MCNC: 9.1 MG/DL (ref 8.5–10.1)
CHLORIDE SERPL-SCNC: 96 MMOL/L (ref 98–107)
CO2 SERPL-SCNC: 27 MMOL/L (ref 21–32)
CREAT SERPL-MCNC: 0.62 MG/DL (ref 0.7–1.3)
DIFFERENTIAL METHOD BLD: ABNORMAL
EGFR (NO RACE VARIABLE) (RUSH/TITUS): 131 ML/MIN/1.73M2
EOSINOPHIL # BLD AUTO: 0.16 K/UL (ref 0–0.5)
EOSINOPHIL NFR BLD AUTO: 1.8 % (ref 1–4)
ERYTHROCYTE [DISTWIDTH] IN BLOOD BY AUTOMATED COUNT: 18.7 % (ref 11.5–14.5)
GLUCOSE SERPL-MCNC: 160 MG/DL (ref 74–106)
HCT VFR BLD AUTO: 36.3 % (ref 40–54)
HGB BLD-MCNC: 11.3 G/DL (ref 13.5–18)
LYMPHOCYTES # BLD AUTO: 1.58 K/UL (ref 1–4.8)
LYMPHOCYTES NFR BLD AUTO: 17.8 % (ref 27–41)
MCH RBC QN AUTO: 28.7 PG (ref 27–31)
MCHC RBC AUTO-ENTMCNC: 31.1 G/DL (ref 32–36)
MCV RBC AUTO: 92.1 FL (ref 80–96)
MONOCYTES # BLD AUTO: 0.87 K/UL (ref 0–0.8)
MONOCYTES NFR BLD AUTO: 9.8 % (ref 2–6)
MPC BLD CALC-MCNC: 10 FL (ref 9.4–12.4)
NEUTROPHILS # BLD AUTO: 6.21 K/UL (ref 1.8–7.7)
NEUTROPHILS NFR BLD AUTO: 70.1 % (ref 53–65)
PLATELET # BLD AUTO: 418 K/UL (ref 150–400)
POTASSIUM SERPL-SCNC: 3.5 MMOL/L (ref 3.5–5.1)
RBC # BLD AUTO: 3.94 M/UL (ref 4.6–6.2)
SODIUM SERPL-SCNC: 132 MMOL/L (ref 136–145)
WBC # BLD AUTO: 8.86 K/UL (ref 4.5–11)

## 2024-01-02 PROCEDURE — 85025 COMPLETE CBC W/AUTO DIFF WBC: CPT | Performed by: INTERNAL MEDICINE

## 2024-01-02 PROCEDURE — 99309 SBSQ NF CARE MODERATE MDM 30: CPT | Mod: ,,, | Performed by: INTERNAL MEDICINE

## 2024-01-02 PROCEDURE — 99900026 HC AIRWAY MAINTENANCE (STAT)

## 2024-01-02 PROCEDURE — 27000958

## 2024-01-02 PROCEDURE — 25000003 PHARM REV CODE 250: Performed by: INTERNAL MEDICINE

## 2024-01-02 PROCEDURE — 27000221 HC OXYGEN, UP TO 24 HOURS

## 2024-01-02 PROCEDURE — 80048 BASIC METABOLIC PNL TOTAL CA: CPT | Performed by: INTERNAL MEDICINE

## 2024-01-02 PROCEDURE — 94761 N-INVAS EAR/PLS OXIMETRY MLT: CPT

## 2024-01-02 PROCEDURE — 94640 AIRWAY INHALATION TREATMENT: CPT

## 2024-01-02 PROCEDURE — 27000935

## 2024-01-02 PROCEDURE — 94660 CPAP INITIATION&MGMT: CPT | Mod: XB

## 2024-01-02 PROCEDURE — 25000003 PHARM REV CODE 250: Performed by: REGISTERED NURSE

## 2024-01-02 PROCEDURE — 63600175 PHARM REV CODE 636 W HCPCS: Performed by: INTERNAL MEDICINE

## 2024-01-02 PROCEDURE — 25000242 PHARM REV CODE 250 ALT 637 W/ HCPCS: Performed by: INTERNAL MEDICINE

## 2024-01-02 PROCEDURE — 99900035 HC TECH TIME PER 15 MIN (STAT)

## 2024-01-02 PROCEDURE — 11000004 HC SNF PRIVATE

## 2024-01-02 PROCEDURE — 94003 VENT MGMT INPAT SUBQ DAY: CPT

## 2024-01-02 RX ORDER — PREDNISONE 10 MG/1
10 TABLET ORAL DAILY
Status: DISCONTINUED | OUTPATIENT
Start: 2024-01-03 | End: 2024-01-31 | Stop reason: HOSPADM

## 2024-01-02 RX ADMIN — APIXABAN 5 MG: 2.5 TABLET, FILM COATED ORAL at 08:01

## 2024-01-02 RX ADMIN — LEVETIRACETAM 500 MG: 500 SOLUTION ORAL at 08:01

## 2024-01-02 RX ADMIN — IPRATROPIUM BROMIDE AND ALBUTEROL SULFATE 3 ML: 2.5; .5 SOLUTION RESPIRATORY (INHALATION) at 07:01

## 2024-01-02 RX ADMIN — POTASSIUM IODIDE 5 DROP: 1 SOLUTION ORAL at 04:01

## 2024-01-02 RX ADMIN — BUDESONIDE 0.5 MG: 0.5 INHALANT ORAL at 07:01

## 2024-01-02 RX ADMIN — PANTOPRAZOLE SODIUM 40 MG: 40 GRANULE, DELAYED RELEASE ORAL at 08:01

## 2024-01-02 RX ADMIN — POTASSIUM IODIDE 5 DROP: 1 SOLUTION ORAL at 12:01

## 2024-01-02 RX ADMIN — POTASSIUM IODIDE 5 DROP: 1 SOLUTION ORAL at 08:01

## 2024-01-02 RX ADMIN — PREDNISONE 20 MG: 20 TABLET ORAL at 08:01

## 2024-01-02 NOTE — ASSESSMENT & PLAN NOTE
Patient tolerating 24 hours of trach collar, send to Dr. Yaneth Tovar's clinic this week via ambulance to 6.5 cuffed trach for possibility of downsizing.  Will says done will start plugging his trach using his home CPAP

## 2024-01-02 NOTE — PLAN OF CARE
Patient is awake but does not respond to commands.  HR 90, RR 21, o2 sat 100% on 40% trach collar with rhonci bilaterally.    Problem: Communication Impairment (Mechanical Ventilation, Invasive)  Goal: Effective Communication  Outcome: Ongoing, Progressing  Intervention: Ensure Effective Communication  Flowsheets (Taken 1/2/2024 1355)  Communication Enhancement Strategies:   nonverbal strategies used   verbal communication attempts encouraged     Problem: Device-Related Complication Risk (Mechanical Ventilation, Invasive)  Goal: Optimal Device Function  Outcome: Ongoing, Progressing  Intervention: Optimize Device Care and Function  Flowsheets (Taken 1/2/2024 1355)  Airway Safety Measures:   manual resuscitator/mask at bedside   suction at bedside   oxygen flowmeter at bedside     Problem: Inability to Wean (Mechanical Ventilation, Invasive)  Goal: Mechanical Ventilation Liberation  Outcome: Ongoing, Progressing  Intervention: Promote Extubation and Mechanical Ventilation Liberation  Flowsheets (Taken 1/2/2024 1355)  Environmental Support:   calm environment promoted   caregiver consistency promoted     Problem: Skin and Tissue Injury (Mechanical Ventilation, Invasive)  Goal: Absence of Device-Related Skin and Tissue Injury  Outcome: Ongoing, Progressing  Intervention: Maintain Skin and Tissue Health  Flowsheets (Taken 1/2/2024 1355)  Device Skin Pressure Protection: absorbent pad utilized/changed     Problem: Ventilator-Induced Lung Injury (Mechanical Ventilation, Invasive)  Goal: Absence of Ventilator-Induced Lung Injury  Outcome: Met  Intervention: Prevent Ventilator-Associated Pneumonia  Flowsheets (Taken 1/2/2024 1355)  Head of Bed (HOB) Positioning: HOB at 30 degrees  Oral Care:   suction provided   oral rinse provided   lip/mouth moisturizer applied   teeth brushed   tongue brushed     Problem: Communication Impairment (Artificial Airway)  Goal: Effective Communication  Outcome: Ongoing,  Progressing  Intervention: Ensure Effective Communication  Flowsheets (Taken 1/2/2024 1355)  Communication Enhancement Strategies:   nonverbal strategies used   verbal communication attempts encouraged     Problem: Device-Related Complication Risk (Artificial Airway)  Goal: Optimal Device Function  Outcome: Ongoing, Progressing  Intervention: Optimize Device Care and Function  Flowsheets (Taken 1/2/2024 1355)  Airway/Ventilation Management:   airway patency maintained   calming measures promoted   humidification applied   pulmonary hygiene promoted  Airway Safety Measures:   manual resuscitator/mask at bedside   suction at bedside   oxygen flowmeter at bedside  Oral Care:   suction provided   oral rinse provided   lip/mouth moisturizer applied   teeth brushed   tongue brushed     Problem: Skin and Tissue Injury (Artificial Airway)  Goal: Absence of Device-Related Skin or Tissue Injury  Outcome: Ongoing, Progressing  Intervention: Maintain Skin and Tissue Health  Flowsheets (Taken 1/2/2024 1355)  Device Skin Pressure Protection: absorbent pad utilized/changed     Problem: Noninvasive Ventilation Acute  Goal: Effective Unassisted Ventilation and Oxygenation  Outcome: Met  Intervention: Monitor and Manage Noninvasive Ventilation  Flowsheets (Taken 1/2/2024 1355)  Airway/Ventilation Management:   airway patency maintained   calming measures promoted   humidification applied   pulmonary hygiene promoted     Problem: Gas Exchange Impaired  Goal: Optimal Gas Exchange  Outcome: Ongoing, Progressing  Intervention: Optimize Oxygenation and Ventilation  Flowsheets (Taken 1/2/2024 1355)  Airway/Ventilation Management:   airway patency maintained   calming measures promoted   humidification applied   pulmonary hygiene promoted  Head of Bed (HOB) Positioning: HOB at 30 degrees     Problem: Breathing Pattern Ineffective  Goal: Effective Breathing Pattern  Outcome: Ongoing, Progressing  Intervention: Promote Improved Breathing  Pattern  Flowsheets (Taken 1/2/2024 1355)  Airway/Ventilation Management:   airway patency maintained   calming measures promoted   humidification applied   pulmonary hygiene promoted  Breathing Techniques/Airway Clearance: deep/controlled cough encouraged  Head of Bed (HOB) Positioning: HOB at 30 degrees     Problem: Airway Clearance Ineffective  Goal: Effective Airway Clearance  Outcome: Ongoing, Progressing  Intervention: Promote Airway Secretion Clearance  Flowsheets (Taken 1/2/2024 1357)  Breathing Techniques/Airway Clearance: deep/controlled cough encouraged  Cough And Deep Breathing: done independently per patient

## 2024-01-02 NOTE — CARE UPDATE
[facility] - Medical Surgical Unit - Swing Bed   Interdisciplinary Team Meeting    Patient: Blue Abdul   Today's Date: 12/13/2023   Estimated D/C Date:         Physician: Ramiro Flores MD Nurse Practitioner:  nida   Pharmacy:  [facility]  Unit Director: Holly Carrillo   : Anita Krause Physical/Occupational Therapy: Jadon Mark   Speech Therapy:  Monica Anderson Activity Therapy: nida   Nursing: Holly Carrillo  Respiratory: Tona Beckwith Dietary: Kuldeep Boggs  Other: na     Nurse  New Symptoms/Problems: na  Last Bowel Movement: 12/12/23   Urine: incontinent  Rojas: No  Bowel: incontinent   Constipated: No  Diarrhea: No   Isolation: Yes  Wound Care: no  Wound Location/Tx: na  Cognition:  wnl  Aspiration Precautions: Yes  Comment(s): nida    Respiratory   O2 Device:  AVAPS 30% tc 12 hour increase 1 hour daily  O2 Flow: 30%  SpO2: 100%  Neb Tx: No  Comment(s): nida     Dietary  Nutrition:  Peptaman AF @50 CC/HR WITH 35CC FLUSH Q 1 HOUR   Comment(s): NIDA    Speech Therapy  Speech/Swallowing: No current speech or swallowing issues  Comment(s): STILL ON AVAPS    Physical Therapy  Gait/Assistive Device: NIDA ELOS: Plan to DC     Transfers: Total Assistance  Bed Mobility: Total Assistance Range of Motion/Restrictions: NA  Comment(s): NIDA     Occupational Therapy  Eating/Grooming: Total Assistance Toileting: Total Assistance   Bathing: Total Assistance Dressing (Upper Body): Total Assistance   Dressing (Lower Body): Total Assistance Comment(s): NIDA     Pharmacy  Medication Changes (see all MD orders in chart): No  Labs Reviewed: Yes  New Lab Orders: No  Comment(s): NIDA      Tx Plan/Recommendations reviewed with family and/or patient on (date) 12/13/23.  Additional family Conference/Training: NIDA  D/C Plan/Recommendations: Home with family  CRICKET:   Comment(s): NIDA    3

## 2024-01-02 NOTE — PROGRESS NOTES
[facility] - Medical Surgical Unit  Hospital Medicine  Progress Note    Patient Name: Blue Abdul  MRN: [id number]  Patient Class: IP- Swing   Admission Date: 11/9/2023  Length of Stay: 54 days  Attending Physician: Ramiro Flores MD  Primary Care Provider: Nati Doyle FNP        Subjective:     Principal Problem:Acute hypercapnic respiratory failure        HPI:  33 y-old AAM with PMH of Trisomy 21, asthma, DM, GERD, and LILLIE. Patient presented to an outside hospital system on 09/08/2023 for behavioral issues r/t medication changes. While at the hospital he experienced a hypoglycemic episode with aspiration and was coded. Patient developed new onset seizure at that time with MRI showing cerebral edema. Patient experienced difficulty with vent weaning hattie trach was placed on 09/27/2023, PEG tube placed on 10/05/2023. Patient is admitted to [facility] for continued vent weaning, PT/OT eval and treatment.     Overview/Hospital Course:  No notes on file    Interval History:  Patient without complaints    Review of Systems  Objective:     Vital Signs (Most Recent):  Temp: 99.4 °F (37.4 °C) (01/02/24 0704)  Pulse: 104 (01/02/24 0742)  Resp: (!) 28 (01/02/24 0742)  BP: 130/80 (01/02/24 0704)  SpO2: 100 % (01/02/24 0742) Vital Signs (24h Range):  Temp:  [98.1 °F (36.7 °C)-99.4 °F (37.4 °C)] 99.4 °F (37.4 °C)  Pulse:  [] 104  Resp:  [16-28] 28  SpO2:  [98 %-100 %] 100 %  BP: (117-138)/(66-94) 130/80     Weight: 87.6 kg (193 lb 2 oz)  Body mass index is 39.01 kg/m².    Intake/Output Summary (Last 24 hours) at 1/2/2024 1024  Last data filed at 1/2/2024 0523  Gross per 24 hour   Intake 2220 ml   Output --   Net 2220 ml         Physical Exam  Vitals reviewed.   Constitutional:       Appearance: Normal appearance.      Interventions: He is not intubated.     Comments: Tracheostomy PEG   HENT:      Head: Normocephalic and atraumatic.      Nose: Nose normal.      Mouth/Throat:      Mouth:  Mucous membranes are dry.      Pharynx: Oropharynx is clear.   Eyes:      Extraocular Movements: Extraocular movements intact.      Conjunctiva/sclera: Conjunctivae normal.      Pupils: Pupils are equal, round, and reactive to light.   Cardiovascular:      Rate and Rhythm: Normal rate.      Heart sounds: Normal heart sounds. No murmur heard.  Pulmonary:      Effort: Pulmonary effort is normal. He is not intubated.      Breath sounds: Normal breath sounds.   Abdominal:      General: Abdomen is flat. Bowel sounds are normal.      Palpations: Abdomen is soft.   Musculoskeletal:         General: Normal range of motion.      Cervical back: Normal range of motion and neck supple.      Right lower leg: No edema.      Left lower leg: No edema.   Skin:     General: Skin is warm and dry.      Capillary Refill: Capillary refill takes less than 2 seconds.   Neurological:      General: No focal deficit present.      Mental Status: He is alert and oriented to person, place, and time.   Psychiatric:         Mood and Affect: Mood normal.         Behavior: Behavior normal.             Significant Labs: All pertinent labs within the past 24 hours have been reviewed.  Recent Lab Results         01/02/24  0439        Anion Gap 13       Baso # 0.04       Basophil % 0.5       BUN 13       BUN/CREAT RATIO 21       Calcium 9.1       Chloride 96       CO2 27       Creatinine 0.62       Differential Method Auto       eGFR 131       Eos # 0.16       Eosinophil % 1.8       Glucose 160       Hematocrit 36.3       Hemoglobin 11.3       Lymph # 1.58       Lymph % 17.8       MCH 28.7       MCHC 31.1       MCV 92.1       Mono # 0.87       Mono % 9.8       MPV 10.0       Neutrophils, Abs 6.21       Neutrophils Relative 70.1       Platelet Count 418       Potassium 3.5       RBC 3.94       RDW 18.7       Sodium 132       WBC 8.86               Significant Imaging: I have reviewed all pertinent imaging results/findings within the past 24  hours.    Assessment/Plan:      * Acute hypercapnic respiratory failure  Patient tolerating 24 hours of trach collar, send to Dr. Yaneth Tovar's clinic this week via ambulance to 6.5 cuffed trach for possibility of downsizing.  Will says done will start plugging his trach using his home CPAP    Encephalopathy, metabolic  Seems to be improving    Muscle weakness  Start PT and OT      Seizures  MRI showed encephalopathy  Keppra  Ativan PRN      Diabetes mellitus  Currently good control with sliding scale      VTE Risk Mitigation (From admission, onward)           Ordered     apixaban tablet 5 mg  2 times daily         11/09/23 2008     IP VTE HIGH RISK PATIENT  Once         11/09/23 1641     Place NORMA hose  Until discontinued         11/09/23 1641     Place sequential compression device  Until discontinued         11/09/23 1641                    Discharge Planning   CRICKET:      Code Status: Full Code   Is the patient medically ready for discharge?:     Reason for patient still in hospital (select all that apply): Patient new problem, Patient trending condition, Laboratory test, and Treatment  Discharge Plan A: Home with family, Home Health   Discharge Delays: None known at this time              Ramiro Flores MD  Department of Hospital Medicine   [facility] - Medical Surgical Unit

## 2024-01-02 NOTE — PLAN OF CARE
Problem: Adult Inpatient Plan of Care  Goal: Plan of Care Review  Outcome: Ongoing, Progressing     Problem: Adult Inpatient Plan of Care  Goal: Absence of Hospital-Acquired Illness or Injury  Outcome: Ongoing, Progressing     Problem: Adult Inpatient Plan of Care  Goal: Optimal Comfort and Wellbeing  Outcome: Ongoing, Progressing     Problem: Nutrition Impairment (Mechanical Ventilation, Invasive)  Goal: Optimal Nutrition Delivery  Outcome: Ongoing, Progressing     Problem: Impaired Wound Healing  Goal: Optimal Wound Healing  Outcome: Ongoing, Progressing

## 2024-01-02 NOTE — PLAN OF CARE
Care plan reviewed  Problem: Adult Inpatient Plan of Care  Goal: Plan of Care Review  1/2/2024 1311 by Maxx Light RN  Outcome: Ongoing, Progressing  1/2/2024 1238 by Maxx Light RN  Outcome: Ongoing, Progressing     Problem: Adult Inpatient Plan of Care  Goal: Patient-Specific Goal (Individualized)  1/2/2024 1311 by Maxx Light RN  Outcome: Ongoing, Progressing  1/2/2024 1238 by Maxx Light RN  Outcome: Ongoing, Progressing     Problem: Adult Inpatient Plan of Care  Goal: Absence of Hospital-Acquired Illness or Injury  1/2/2024 1311 by Maxx Light RN  Outcome: Ongoing, Progressing  1/2/2024 1238 by Maxx Light RN  Outcome: Ongoing, Progressing     Problem: Adult Inpatient Plan of Care  Goal: Optimal Comfort and Wellbeing  1/2/2024 1311 by Maxx Light RN  Outcome: Ongoing, Progressing  1/2/2024 1238 by Maxx Light RN  Outcome: Ongoing, Progressing     Problem: Adult Inpatient Plan of Care  Goal: Readiness for Transition of Care  1/2/2024 1311 by Maxx Light RN  Outcome: Ongoing, Progressing  1/2/2024 1238 by Maxx Light RN  Outcome: Ongoing, Progressing

## 2024-01-02 NOTE — SUBJECTIVE & OBJECTIVE
Interval History:  Patient without complaints    Review of Systems  Objective:     Vital Signs (Most Recent):  Temp: 99.4 °F (37.4 °C) (01/02/24 0704)  Pulse: 104 (01/02/24 0742)  Resp: (!) 28 (01/02/24 0742)  BP: 130/80 (01/02/24 0704)  SpO2: 100 % (01/02/24 0742) Vital Signs (24h Range):  Temp:  [98.1 °F (36.7 °C)-99.4 °F (37.4 °C)] 99.4 °F (37.4 °C)  Pulse:  [] 104  Resp:  [16-28] 28  SpO2:  [98 %-100 %] 100 %  BP: (117-138)/(66-94) 130/80     Weight: 87.6 kg (193 lb 2 oz)  Body mass index is 39.01 kg/m².    Intake/Output Summary (Last 24 hours) at 1/2/2024 1024  Last data filed at 1/2/2024 0523  Gross per 24 hour   Intake 2220 ml   Output --   Net 2220 ml         Physical Exam  Vitals reviewed.   Constitutional:       Appearance: Normal appearance.      Interventions: He is not intubated.     Comments: Tracheostomy PEG   HENT:      Head: Normocephalic and atraumatic.      Nose: Nose normal.      Mouth/Throat:      Mouth: Mucous membranes are dry.      Pharynx: Oropharynx is clear.   Eyes:      Extraocular Movements: Extraocular movements intact.      Conjunctiva/sclera: Conjunctivae normal.      Pupils: Pupils are equal, round, and reactive to light.   Cardiovascular:      Rate and Rhythm: Normal rate.      Heart sounds: Normal heart sounds. No murmur heard.  Pulmonary:      Effort: Pulmonary effort is normal. He is not intubated.      Breath sounds: Normal breath sounds.   Abdominal:      General: Abdomen is flat. Bowel sounds are normal.      Palpations: Abdomen is soft.   Musculoskeletal:         General: Normal range of motion.      Cervical back: Normal range of motion and neck supple.      Right lower leg: No edema.      Left lower leg: No edema.   Skin:     General: Skin is warm and dry.      Capillary Refill: Capillary refill takes less than 2 seconds.   Neurological:      General: No focal deficit present.      Mental Status: He is alert and oriented to person, place, and time.   Psychiatric:          Mood and Affect: Mood normal.         Behavior: Behavior normal.             Significant Labs: All pertinent labs within the past 24 hours have been reviewed.  Recent Lab Results         01/02/24  0439        Anion Gap 13       Baso # 0.04       Basophil % 0.5       BUN 13       BUN/CREAT RATIO 21       Calcium 9.1       Chloride 96       CO2 27       Creatinine 0.62       Differential Method Auto       eGFR 131       Eos # 0.16       Eosinophil % 1.8       Glucose 160       Hematocrit 36.3       Hemoglobin 11.3       Lymph # 1.58       Lymph % 17.8       MCH 28.7       MCHC 31.1       MCV 92.1       Mono # 0.87       Mono % 9.8       MPV 10.0       Neutrophils, Abs 6.21       Neutrophils Relative 70.1       Platelet Count 418       Potassium 3.5       RBC 3.94       RDW 18.7       Sodium 132       WBC 8.86               Significant Imaging: I have reviewed all pertinent imaging results/findings within the past 24 hours.

## 2024-01-03 LAB — GLUCOSE SERPL-MCNC: 154 MG/DL (ref 70–105)

## 2024-01-03 PROCEDURE — 27000958

## 2024-01-03 PROCEDURE — 63600175 PHARM REV CODE 636 W HCPCS: Performed by: INTERNAL MEDICINE

## 2024-01-03 PROCEDURE — 27000221 HC OXYGEN, UP TO 24 HOURS

## 2024-01-03 PROCEDURE — 25000003 PHARM REV CODE 250: Performed by: REGISTERED NURSE

## 2024-01-03 PROCEDURE — 27000935

## 2024-01-03 PROCEDURE — 25000242 PHARM REV CODE 250 ALT 637 W/ HCPCS: Performed by: INTERNAL MEDICINE

## 2024-01-03 PROCEDURE — 99900026 HC AIRWAY MAINTENANCE (STAT)

## 2024-01-03 PROCEDURE — 82962 GLUCOSE BLOOD TEST: CPT

## 2024-01-03 PROCEDURE — 94640 AIRWAY INHALATION TREATMENT: CPT

## 2024-01-03 PROCEDURE — 94761 N-INVAS EAR/PLS OXIMETRY MLT: CPT

## 2024-01-03 PROCEDURE — 25000003 PHARM REV CODE 250: Performed by: INTERNAL MEDICINE

## 2024-01-03 PROCEDURE — 99900035 HC TECH TIME PER 15 MIN (STAT)

## 2024-01-03 PROCEDURE — 11000004 HC SNF PRIVATE

## 2024-01-03 RX ADMIN — PANTOPRAZOLE SODIUM 40 MG: 40 GRANULE, DELAYED RELEASE ORAL at 09:01

## 2024-01-03 RX ADMIN — POTASSIUM IODIDE 5 DROP: 1 SOLUTION ORAL at 12:01

## 2024-01-03 RX ADMIN — APIXABAN 5 MG: 2.5 TABLET, FILM COATED ORAL at 09:01

## 2024-01-03 RX ADMIN — POTASSIUM IODIDE 5 DROP: 1 SOLUTION ORAL at 08:01

## 2024-01-03 RX ADMIN — PANTOPRAZOLE SODIUM 40 MG: 40 GRANULE, DELAYED RELEASE ORAL at 08:01

## 2024-01-03 RX ADMIN — POTASSIUM IODIDE 5 DROP: 1 SOLUTION ORAL at 04:01

## 2024-01-03 RX ADMIN — PREDNISONE 10 MG: 10 TABLET ORAL at 09:01

## 2024-01-03 RX ADMIN — POLYETHYLENE GLYCOL 3350 17 G: 17 POWDER, FOR SOLUTION ORAL at 08:01

## 2024-01-03 RX ADMIN — APIXABAN 5 MG: 2.5 TABLET, FILM COATED ORAL at 08:01

## 2024-01-03 RX ADMIN — POTASSIUM IODIDE 5 DROP: 1 SOLUTION ORAL at 09:01

## 2024-01-03 RX ADMIN — BUDESONIDE 0.5 MG: 0.5 INHALANT ORAL at 07:01

## 2024-01-03 RX ADMIN — LEVETIRACETAM 500 MG: 500 SOLUTION ORAL at 08:01

## 2024-01-03 RX ADMIN — LEVETIRACETAM 500 MG: 500 SOLUTION ORAL at 09:01

## 2024-01-03 RX ADMIN — IPRATROPIUM BROMIDE AND ALBUTEROL SULFATE 3 ML: 2.5; .5 SOLUTION RESPIRATORY (INHALATION) at 07:01

## 2024-01-03 NOTE — PLAN OF CARE
Weekly Swing Bed note    Patient's sat this morning was 100% on 40% Trach collar.  Patient is being sent to have his trach changed out on January, 4 2024.  Patient has a size 8 trach now and they are going to downsize to a 6.0 or 6.5 trach.  BBS coarse. Tolerating trach collar well.

## 2024-01-03 NOTE — PLAN OF CARE
Problem: Device-Related Complication Risk (Mechanical Ventilation, Invasive)  Goal: Optimal Device Function  Outcome: Ongoing, Progressing     Problem: Inability to Wean (Mechanical Ventilation, Invasive)  Goal: Mechanical Ventilation Liberation  Outcome: Ongoing, Progressing     Problem: Communication Impairment (Artificial Airway)  Goal: Effective Communication  Outcome: Ongoing, Progressing     Problem: Device-Related Complication Risk (Artificial Airway)  Goal: Optimal Device Function  Outcome: Ongoing, Progressing     Problem: Gas Exchange Impaired  Goal: Optimal Gas Exchange  Outcome: Ongoing, Progressing     Problem: Breathing Pattern Ineffective  Goal: Effective Breathing Pattern  Outcome: Ongoing, Progressing     Problem: Airway Clearance Ineffective  Goal: Effective Airway Clearance  Outcome: Ongoing, Progressing

## 2024-01-03 NOTE — PROGRESS NOTES
Wt: 193#  Pt had a little emesis  but no inc residuals, but otherwise ok. Trach to possibly be downsized next week. B-191, crt 0.62, Na 132, Cl 96.  Last Bm .  Current TF+flushes=21ml per kg fluid ABW, 34ml per kg Adjust BW, 1.7g per kg Adjusted BW, 1.0g per kg ABW.  27ml per kg Adjusted BW.  PT continues on TC.

## 2024-01-04 ENCOUNTER — OFFICE VISIT (OUTPATIENT)
Dept: OTOLARYNGOLOGY | Facility: CLINIC | Age: 34
End: 2024-01-04
Payer: MEDICARE

## 2024-01-04 DIAGNOSIS — R06.03 RESPIRATORY DISTRESS: Primary | ICD-10-CM

## 2024-01-04 LAB — GLUCOSE SERPL-MCNC: 127 MG/DL (ref 70–105)

## 2024-01-04 PROCEDURE — 27000941

## 2024-01-04 PROCEDURE — 31502 CHANGE OF WINDPIPE AIRWAY: CPT | Mod: S$PBB,,, | Performed by: OTOLARYNGOLOGY

## 2024-01-04 PROCEDURE — 82962 GLUCOSE BLOOD TEST: CPT

## 2024-01-04 PROCEDURE — 31502 CHANGE OF WINDPIPE AIRWAY: CPT | Mod: PBBFAC | Performed by: OTOLARYNGOLOGY

## 2024-01-04 PROCEDURE — 25000003 PHARM REV CODE 250: Performed by: INTERNAL MEDICINE

## 2024-01-04 PROCEDURE — 25000003 PHARM REV CODE 250: Performed by: REGISTERED NURSE

## 2024-01-04 PROCEDURE — 99204 OFFICE O/P NEW MOD 45 MIN: CPT | Mod: 25,S$PBB,, | Performed by: OTOLARYNGOLOGY

## 2024-01-04 PROCEDURE — 99900035 HC TECH TIME PER 15 MIN (STAT)

## 2024-01-04 PROCEDURE — 25000242 PHARM REV CODE 250 ALT 637 W/ HCPCS: Performed by: INTERNAL MEDICINE

## 2024-01-04 PROCEDURE — 11000004 HC SNF PRIVATE

## 2024-01-04 PROCEDURE — 27000935

## 2024-01-04 PROCEDURE — 99900026 HC AIRWAY MAINTENANCE (STAT)

## 2024-01-04 PROCEDURE — 27200966 HC CLOSED SUCTION SYSTEM

## 2024-01-04 PROCEDURE — 94640 AIRWAY INHALATION TREATMENT: CPT

## 2024-01-04 PROCEDURE — A6212 FOAM DRG <=16 SQ IN W/BORDER: HCPCS

## 2024-01-04 PROCEDURE — 27000221 HC OXYGEN, UP TO 24 HOURS

## 2024-01-04 PROCEDURE — 99212 OFFICE O/P EST SF 10 MIN: CPT | Mod: PBBFAC | Performed by: OTOLARYNGOLOGY

## 2024-01-04 PROCEDURE — 94761 N-INVAS EAR/PLS OXIMETRY MLT: CPT

## 2024-01-04 RX ADMIN — IPRATROPIUM BROMIDE AND ALBUTEROL SULFATE 3 ML: 2.5; .5 SOLUTION RESPIRATORY (INHALATION) at 07:01

## 2024-01-04 RX ADMIN — BUDESONIDE 0.5 MG: 0.5 INHALANT ORAL at 07:01

## 2024-01-04 RX ADMIN — APIXABAN 5 MG: 2.5 TABLET, FILM COATED ORAL at 08:01

## 2024-01-04 RX ADMIN — POTASSIUM IODIDE 5 DROP: 1 SOLUTION ORAL at 08:01

## 2024-01-04 RX ADMIN — PANTOPRAZOLE SODIUM 40 MG: 40 GRANULE, DELAYED RELEASE ORAL at 08:01

## 2024-01-04 RX ADMIN — POTASSIUM IODIDE 5 DROP: 1 SOLUTION ORAL at 05:01

## 2024-01-04 RX ADMIN — LEVETIRACETAM 500 MG: 500 SOLUTION ORAL at 08:01

## 2024-01-04 RX ADMIN — POLYETHYLENE GLYCOL 3350 17 G: 17 POWDER, FOR SOLUTION ORAL at 08:01

## 2024-01-04 NOTE — PLAN OF CARE
Problem: Communication Impairment (Artificial Airway)  Goal: Effective Communication  Outcome: Ongoing, Not Progressing     Problem: Skin and Tissue Injury (Artificial Airway)  Goal: Absence of Device-Related Skin or Tissue Injury  Outcome: Ongoing, Progressing     Problem: Fall Injury Risk  Goal: Absence of Fall and Fall-Related Injury  Outcome: Ongoing, Progressing     Problem: Skin Injury Risk Increased  Goal: Skin Health and Integrity  Outcome: Ongoing, Progressing

## 2024-01-04 NOTE — PROGRESS NOTES
Subjective:       Patient ID: Blue Abdul is a 33 y.o. male.    Chief Complaint: No chief complaint on file.  Needs trach change on trach collar doing well   HPI  Review of Systems   Respiratory:  Positive for shortness of breath.        Objective:      Physical Exam  General: NAD  Ears: Both auricules normal in appearance, Nose: External nose w/o deformities normal turbinates no drainage or inflammation  Oral Cavity: Lips, gums, floor of mouth, tongue hard palate, and buccal mucosa without mass/lesion  Oropharynx: Mucosa pink and moist, soft palate, posterior pharynx and oropharyngeal wall without mass/lesion  Neck: Supple, symmetric, trachea midline, no palpable mass/lesion, no palpable cervical lymphadenopathy trach in place   Skin: Warm and dry, no concerning lesions  Respiratory: Respirations even, unlabored    Procedure :Tracheostomy tube change pt changed from #8 to #6 trach without difficulty  maintained O 2 sat during procedure  Assessment:       1. Respiratory distress        Plan:       F/u as needed

## 2024-01-04 NOTE — NURSING
Patient returned by ambulance from having trach changed by Dr Tovar. Now has a 6xl shiley. Trach collar continued at 35%. V/S stable.

## 2024-01-05 LAB — GLUCOSE SERPL-MCNC: 131 MG/DL (ref 70–105)

## 2024-01-05 PROCEDURE — 94761 N-INVAS EAR/PLS OXIMETRY MLT: CPT

## 2024-01-05 PROCEDURE — 94640 AIRWAY INHALATION TREATMENT: CPT

## 2024-01-05 PROCEDURE — 27000941

## 2024-01-05 PROCEDURE — 99900035 HC TECH TIME PER 15 MIN (STAT)

## 2024-01-05 PROCEDURE — 25000242 PHARM REV CODE 250 ALT 637 W/ HCPCS: Performed by: INTERNAL MEDICINE

## 2024-01-05 PROCEDURE — 25000003 PHARM REV CODE 250: Performed by: INTERNAL MEDICINE

## 2024-01-05 PROCEDURE — 25000003 PHARM REV CODE 250: Performed by: REGISTERED NURSE

## 2024-01-05 PROCEDURE — 27000221 HC OXYGEN, UP TO 24 HOURS

## 2024-01-05 PROCEDURE — 27200966 HC CLOSED SUCTION SYSTEM

## 2024-01-05 PROCEDURE — 63600175 PHARM REV CODE 636 W HCPCS: Performed by: INTERNAL MEDICINE

## 2024-01-05 PROCEDURE — 82962 GLUCOSE BLOOD TEST: CPT

## 2024-01-05 PROCEDURE — 27000935

## 2024-01-05 PROCEDURE — 11000004 HC SNF PRIVATE

## 2024-01-05 PROCEDURE — 99900026 HC AIRWAY MAINTENANCE (STAT)

## 2024-01-05 RX ADMIN — BUDESONIDE 0.5 MG: 0.5 INHALANT ORAL at 07:01

## 2024-01-05 RX ADMIN — APIXABAN 5 MG: 2.5 TABLET, FILM COATED ORAL at 08:01

## 2024-01-05 RX ADMIN — IPRATROPIUM BROMIDE AND ALBUTEROL SULFATE 3 ML: 2.5; .5 SOLUTION RESPIRATORY (INHALATION) at 07:01

## 2024-01-05 RX ADMIN — PREDNISONE 10 MG: 10 TABLET ORAL at 08:01

## 2024-01-05 RX ADMIN — LEVETIRACETAM 500 MG: 500 SOLUTION ORAL at 08:01

## 2024-01-05 RX ADMIN — POTASSIUM IODIDE 5 DROP: 1 SOLUTION ORAL at 08:01

## 2024-01-05 RX ADMIN — POTASSIUM IODIDE 5 DROP: 1 SOLUTION ORAL at 05:01

## 2024-01-05 RX ADMIN — PANTOPRAZOLE SODIUM 40 MG: 40 GRANULE, DELAYED RELEASE ORAL at 08:01

## 2024-01-05 RX ADMIN — POTASSIUM IODIDE 5 DROP: 1 SOLUTION ORAL at 01:01

## 2024-01-05 NOTE — CARE UPDATE
Chantell, failed his capping trail and only lasted for 4 minutes. We almost RRT him HR 41, RR 11, and SaO2 53%. Dr. Flores was notified about it. Placed patient back on trach collar at 100% FiO2 and called Juan Carlos Perez RN, Ugo Light RN, and Ara Light, RN, and FABIO Ramsey into the room.

## 2024-01-05 NOTE — PLAN OF CARE
Patients trach size was downsized today to a 6XLT. Patient seems to be tolerating smaller size well. O2 sat 100% on 35% trach collar. Also decreased FIO2 to 30%.

## 2024-01-06 LAB — GLUCOSE SERPL-MCNC: 171 MG/DL (ref 70–105)

## 2024-01-06 PROCEDURE — 27000935

## 2024-01-06 PROCEDURE — 94640 AIRWAY INHALATION TREATMENT: CPT

## 2024-01-06 PROCEDURE — 99900026 HC AIRWAY MAINTENANCE (STAT)

## 2024-01-06 PROCEDURE — 99900035 HC TECH TIME PER 15 MIN (STAT)

## 2024-01-06 PROCEDURE — 25000003 PHARM REV CODE 250: Performed by: REGISTERED NURSE

## 2024-01-06 PROCEDURE — 63600175 PHARM REV CODE 636 W HCPCS: Performed by: INTERNAL MEDICINE

## 2024-01-06 PROCEDURE — 27000221 HC OXYGEN, UP TO 24 HOURS

## 2024-01-06 PROCEDURE — 25000242 PHARM REV CODE 250 ALT 637 W/ HCPCS: Performed by: INTERNAL MEDICINE

## 2024-01-06 PROCEDURE — 11000004 HC SNF PRIVATE

## 2024-01-06 PROCEDURE — 27000958

## 2024-01-06 PROCEDURE — 82962 GLUCOSE BLOOD TEST: CPT

## 2024-01-06 PROCEDURE — 25000003 PHARM REV CODE 250: Performed by: INTERNAL MEDICINE

## 2024-01-06 PROCEDURE — 94761 N-INVAS EAR/PLS OXIMETRY MLT: CPT

## 2024-01-06 RX ADMIN — IPRATROPIUM BROMIDE AND ALBUTEROL SULFATE 3 ML: 2.5; .5 SOLUTION RESPIRATORY (INHALATION) at 07:01

## 2024-01-06 RX ADMIN — PANTOPRAZOLE SODIUM 40 MG: 40 GRANULE, DELAYED RELEASE ORAL at 08:01

## 2024-01-06 RX ADMIN — POTASSIUM IODIDE 5 DROP: 1 SOLUTION ORAL at 08:01

## 2024-01-06 RX ADMIN — LEVETIRACETAM 500 MG: 500 SOLUTION ORAL at 08:01

## 2024-01-06 RX ADMIN — BUDESONIDE 0.5 MG: 0.5 INHALANT ORAL at 07:01

## 2024-01-06 RX ADMIN — APIXABAN 5 MG: 2.5 TABLET, FILM COATED ORAL at 08:01

## 2024-01-06 RX ADMIN — PREDNISONE 10 MG: 10 TABLET ORAL at 08:01

## 2024-01-06 RX ADMIN — POTASSIUM IODIDE 5 DROP: 1 SOLUTION ORAL at 04:01

## 2024-01-06 RX ADMIN — POTASSIUM IODIDE 5 DROP: 1 SOLUTION ORAL at 01:01

## 2024-01-06 NOTE — PLAN OF CARE
Problem: Communication Impairment (Mechanical Ventilation, Invasive)  Goal: Effective Communication  Outcome: Ongoing, Progressing     Problem: Inability to Wean (Mechanical Ventilation, Invasive)  Goal: Mechanical Ventilation Liberation  Outcome: Ongoing, Progressing     Problem: Communication Impairment (Artificial Airway)  Goal: Effective Communication  Outcome: Ongoing, Progressing     Problem: Device-Related Complication Risk (Artificial Airway)  Goal: Optimal Device Function  Outcome: Ongoing, Progressing     Problem: Gas Exchange Impaired  Goal: Optimal Gas Exchange  Outcome: Ongoing, Progressing

## 2024-01-07 LAB — GLUCOSE SERPL-MCNC: 195 MG/DL (ref 70–105)

## 2024-01-07 PROCEDURE — 25000003 PHARM REV CODE 250: Performed by: INTERNAL MEDICINE

## 2024-01-07 PROCEDURE — 99900035 HC TECH TIME PER 15 MIN (STAT)

## 2024-01-07 PROCEDURE — 25000003 PHARM REV CODE 250: Performed by: REGISTERED NURSE

## 2024-01-07 PROCEDURE — 25000242 PHARM REV CODE 250 ALT 637 W/ HCPCS: Performed by: INTERNAL MEDICINE

## 2024-01-07 PROCEDURE — 94761 N-INVAS EAR/PLS OXIMETRY MLT: CPT

## 2024-01-07 PROCEDURE — 82962 GLUCOSE BLOOD TEST: CPT

## 2024-01-07 PROCEDURE — 11000004 HC SNF PRIVATE

## 2024-01-07 PROCEDURE — 63600175 PHARM REV CODE 636 W HCPCS: Performed by: INTERNAL MEDICINE

## 2024-01-07 PROCEDURE — 99900026 HC AIRWAY MAINTENANCE (STAT)

## 2024-01-07 PROCEDURE — 27000221 HC OXYGEN, UP TO 24 HOURS

## 2024-01-07 PROCEDURE — 94640 AIRWAY INHALATION TREATMENT: CPT

## 2024-01-07 PROCEDURE — 27000935

## 2024-01-07 PROCEDURE — 27000958

## 2024-01-07 RX ADMIN — LEVETIRACETAM 500 MG: 500 SOLUTION ORAL at 08:01

## 2024-01-07 RX ADMIN — APIXABAN 5 MG: 2.5 TABLET, FILM COATED ORAL at 08:01

## 2024-01-07 RX ADMIN — IPRATROPIUM BROMIDE AND ALBUTEROL SULFATE 3 ML: 2.5; .5 SOLUTION RESPIRATORY (INHALATION) at 07:01

## 2024-01-07 RX ADMIN — POTASSIUM IODIDE 5 DROP: 1 SOLUTION ORAL at 05:01

## 2024-01-07 RX ADMIN — PREDNISONE 10 MG: 10 TABLET ORAL at 08:01

## 2024-01-07 RX ADMIN — POTASSIUM IODIDE 5 DROP: 1 SOLUTION ORAL at 08:01

## 2024-01-07 RX ADMIN — BUDESONIDE 0.5 MG: 0.5 INHALANT ORAL at 07:01

## 2024-01-07 RX ADMIN — POTASSIUM IODIDE 5 DROP: 1 SOLUTION ORAL at 01:01

## 2024-01-07 RX ADMIN — PANTOPRAZOLE SODIUM 40 MG: 40 GRANULE, DELAYED RELEASE ORAL at 08:01

## 2024-01-07 NOTE — PLAN OF CARE
Problem: Communication Impairment (Mechanical Ventilation, Invasive)  Goal: Effective Communication  Outcome: Ongoing, Progressing     Problem: Device-Related Complication Risk (Mechanical Ventilation, Invasive)  Goal: Optimal Device Function  Outcome: Ongoing, Progressing     Problem: Inability to Wean (Mechanical Ventilation, Invasive)  Goal: Mechanical Ventilation Liberation  Outcome: Ongoing, Progressing     Problem: Communication Impairment (Artificial Airway)  Goal: Effective Communication  Outcome: Ongoing, Progressing     Problem: Device-Related Complication Risk (Artificial Airway)  Goal: Optimal Device Function  Outcome: Ongoing, Progressing     Problem: Gas Exchange Impaired  Goal: Optimal Gas Exchange  Outcome: Ongoing, Progressing

## 2024-01-08 LAB — GLUCOSE SERPL-MCNC: 170 MG/DL (ref 70–105)

## 2024-01-08 PROCEDURE — 82962 GLUCOSE BLOOD TEST: CPT

## 2024-01-08 PROCEDURE — 25000003 PHARM REV CODE 250: Performed by: INTERNAL MEDICINE

## 2024-01-08 PROCEDURE — 25000003 PHARM REV CODE 250: Performed by: REGISTERED NURSE

## 2024-01-08 PROCEDURE — 27000221 HC OXYGEN, UP TO 24 HOURS

## 2024-01-08 PROCEDURE — 63600175 PHARM REV CODE 636 W HCPCS: Performed by: INTERNAL MEDICINE

## 2024-01-08 PROCEDURE — 27000958

## 2024-01-08 PROCEDURE — 11000004 HC SNF PRIVATE

## 2024-01-08 PROCEDURE — 25000242 PHARM REV CODE 250 ALT 637 W/ HCPCS: Performed by: INTERNAL MEDICINE

## 2024-01-08 PROCEDURE — 99900026 HC AIRWAY MAINTENANCE (STAT)

## 2024-01-08 PROCEDURE — 94761 N-INVAS EAR/PLS OXIMETRY MLT: CPT

## 2024-01-08 PROCEDURE — 99900035 HC TECH TIME PER 15 MIN (STAT)

## 2024-01-08 PROCEDURE — 94640 AIRWAY INHALATION TREATMENT: CPT

## 2024-01-08 PROCEDURE — 27000935

## 2024-01-08 RX ADMIN — IPRATROPIUM BROMIDE AND ALBUTEROL SULFATE 3 ML: 2.5; .5 SOLUTION RESPIRATORY (INHALATION) at 07:01

## 2024-01-08 RX ADMIN — POTASSIUM IODIDE 5 DROP: 1 SOLUTION ORAL at 01:01

## 2024-01-08 RX ADMIN — BUDESONIDE 0.5 MG: 0.5 INHALANT ORAL at 07:01

## 2024-01-08 RX ADMIN — PREDNISONE 10 MG: 10 TABLET ORAL at 09:01

## 2024-01-08 RX ADMIN — PANTOPRAZOLE SODIUM 40 MG: 40 GRANULE, DELAYED RELEASE ORAL at 09:01

## 2024-01-08 RX ADMIN — POTASSIUM IODIDE 5 DROP: 1 SOLUTION ORAL at 08:01

## 2024-01-08 RX ADMIN — APIXABAN 5 MG: 2.5 TABLET, FILM COATED ORAL at 08:01

## 2024-01-08 RX ADMIN — LEVETIRACETAM 500 MG: 500 SOLUTION ORAL at 09:01

## 2024-01-08 RX ADMIN — POTASSIUM IODIDE 5 DROP: 1 SOLUTION ORAL at 04:01

## 2024-01-08 RX ADMIN — LEVETIRACETAM 500 MG: 500 SOLUTION ORAL at 08:01

## 2024-01-08 RX ADMIN — POTASSIUM IODIDE 5 DROP: 1 SOLUTION ORAL at 09:01

## 2024-01-08 RX ADMIN — PANTOPRAZOLE SODIUM 40 MG: 40 GRANULE, DELAYED RELEASE ORAL at 08:01

## 2024-01-08 RX ADMIN — APIXABAN 5 MG: 2.5 TABLET, FILM COATED ORAL at 09:01

## 2024-01-08 NOTE — CARE UPDATE
Placed speaking valve on pt. Pt only lasted 3 minutes with speaking valve before WOB increased and pt was struggling to expel air. Pts heart rate increased to 119 BPM and Spo2 decreased to 89%. Pt had immediate relief after SV was removed and quickly began to improve. Pt is stable at this time.

## 2024-01-08 NOTE — RESPIRATORY THERAPY
Tried speaking valve on pt with trach collar. Pt failed after 4 minutes wearing speaking valve. Pt unable to expel air through upper airway. Pt WOB increased and pt was in distress while using speaking valve. Pt stable after removing SV.

## 2024-01-08 NOTE — PLAN OF CARE
Weekly Swing Bed Note    Patient's sat this morning was 100% on trach collar at 30%.  Patient is unable to tolerate being capped or having a speaking valve placed.  Due to his conative ability patient is unable to understand how to breath with the cap or the speaking valve.

## 2024-01-08 NOTE — PLAN OF CARE
Problem: Adult Inpatient Plan of Care  Goal: Plan of Care Review  Outcome: Ongoing, Progressing     Problem: Adult Inpatient Plan of Care  Goal: Absence of Hospital-Acquired Illness or Injury  Outcome: Ongoing, Progressing     Problem: Adult Inpatient Plan of Care  Goal: Optimal Comfort and Wellbeing  Outcome: Ongoing, Progressing     Problem: Nutrition Impairment (Mechanical Ventilation, Invasive)  Goal: Optimal Nutrition Delivery  Outcome: Ongoing, Progressing     Problem: Skin Injury Risk Increased  Goal: Skin Health and Integrity  Outcome: Ongoing, Progressing     Problem: Airway Clearance Ineffective  Goal: Effective Airway Clearance  Outcome: Ongoing, Progressing

## 2024-01-09 LAB
ANION GAP SERPL CALCULATED.3IONS-SCNC: 13 MMOL/L (ref 7–16)
BASOPHILS # BLD AUTO: 0.03 K/UL (ref 0–0.2)
BASOPHILS NFR BLD AUTO: 0.4 % (ref 0–1)
BUN SERPL-MCNC: 11 MG/DL (ref 7–18)
BUN/CREAT SERPL: 17 (ref 6–20)
CALCIUM SERPL-MCNC: 9.3 MG/DL (ref 8.5–10.1)
CHLORIDE SERPL-SCNC: 101 MMOL/L (ref 98–107)
CO2 SERPL-SCNC: 27 MMOL/L (ref 21–32)
CREAT SERPL-MCNC: 0.64 MG/DL (ref 0.7–1.3)
DIFFERENTIAL METHOD BLD: ABNORMAL
EGFR (NO RACE VARIABLE) (RUSH/TITUS): 130 ML/MIN/1.73M2
EOSINOPHIL # BLD AUTO: 0.1 K/UL (ref 0–0.5)
EOSINOPHIL NFR BLD AUTO: 1.4 % (ref 1–4)
ERYTHROCYTE [DISTWIDTH] IN BLOOD BY AUTOMATED COUNT: 18.7 % (ref 11.5–14.5)
GLUCOSE SERPL-MCNC: 164 MG/DL (ref 74–106)
HCT VFR BLD AUTO: 38.9 % (ref 40–54)
HGB BLD-MCNC: 12.2 G/DL (ref 13.5–18)
LYMPHOCYTES # BLD AUTO: 1.54 K/UL (ref 1–4.8)
LYMPHOCYTES NFR BLD AUTO: 22.3 % (ref 27–41)
MCH RBC QN AUTO: 28.8 PG (ref 27–31)
MCHC RBC AUTO-ENTMCNC: 31.4 G/DL (ref 32–36)
MCV RBC AUTO: 92 FL (ref 80–96)
MONOCYTES # BLD AUTO: 0.59 K/UL (ref 0–0.8)
MONOCYTES NFR BLD AUTO: 8.6 % (ref 2–6)
MPC BLD CALC-MCNC: 10.3 FL (ref 9.4–12.4)
NEUTROPHILS # BLD AUTO: 4.64 K/UL (ref 1.8–7.7)
NEUTROPHILS NFR BLD AUTO: 67.3 % (ref 53–65)
PLATELET # BLD AUTO: 491 K/UL (ref 150–400)
POTASSIUM SERPL-SCNC: 3.9 MMOL/L (ref 3.5–5.1)
RBC # BLD AUTO: 4.23 M/UL (ref 4.6–6.2)
SODIUM SERPL-SCNC: 137 MMOL/L (ref 136–145)
WBC # BLD AUTO: 6.9 K/UL (ref 4.5–11)

## 2024-01-09 PROCEDURE — 27000221 HC OXYGEN, UP TO 24 HOURS

## 2024-01-09 PROCEDURE — 25000003 PHARM REV CODE 250: Performed by: INTERNAL MEDICINE

## 2024-01-09 PROCEDURE — 85025 COMPLETE CBC W/AUTO DIFF WBC: CPT | Performed by: INTERNAL MEDICINE

## 2024-01-09 PROCEDURE — 25000242 PHARM REV CODE 250 ALT 637 W/ HCPCS: Performed by: INTERNAL MEDICINE

## 2024-01-09 PROCEDURE — 80048 BASIC METABOLIC PNL TOTAL CA: CPT | Performed by: INTERNAL MEDICINE

## 2024-01-09 PROCEDURE — A6212 FOAM DRG <=16 SQ IN W/BORDER: HCPCS

## 2024-01-09 PROCEDURE — 25000003 PHARM REV CODE 250: Performed by: REGISTERED NURSE

## 2024-01-09 PROCEDURE — 99900026 HC AIRWAY MAINTENANCE (STAT)

## 2024-01-09 PROCEDURE — 27000941

## 2024-01-09 PROCEDURE — 11000004 HC SNF PRIVATE

## 2024-01-09 PROCEDURE — 63600175 PHARM REV CODE 636 W HCPCS: Performed by: INTERNAL MEDICINE

## 2024-01-09 PROCEDURE — 94761 N-INVAS EAR/PLS OXIMETRY MLT: CPT

## 2024-01-09 PROCEDURE — 99309 SBSQ NF CARE MODERATE MDM 30: CPT | Mod: ,,, | Performed by: INTERNAL MEDICINE

## 2024-01-09 PROCEDURE — 99900035 HC TECH TIME PER 15 MIN (STAT)

## 2024-01-09 PROCEDURE — 27000935

## 2024-01-09 PROCEDURE — 94640 AIRWAY INHALATION TREATMENT: CPT

## 2024-01-09 RX ADMIN — APIXABAN 5 MG: 2.5 TABLET, FILM COATED ORAL at 09:01

## 2024-01-09 RX ADMIN — PREDNISONE 10 MG: 10 TABLET ORAL at 09:01

## 2024-01-09 RX ADMIN — PANTOPRAZOLE SODIUM 40 MG: 40 GRANULE, DELAYED RELEASE ORAL at 09:01

## 2024-01-09 RX ADMIN — LEVETIRACETAM 500 MG: 500 SOLUTION ORAL at 09:01

## 2024-01-09 RX ADMIN — POTASSIUM IODIDE 5 DROP: 1 SOLUTION ORAL at 05:01

## 2024-01-09 RX ADMIN — POTASSIUM IODIDE 5 DROP: 1 SOLUTION ORAL at 01:01

## 2024-01-09 RX ADMIN — BUDESONIDE 0.5 MG: 0.5 INHALANT ORAL at 07:01

## 2024-01-09 RX ADMIN — IPRATROPIUM BROMIDE AND ALBUTEROL SULFATE 3 ML: 2.5; .5 SOLUTION RESPIRATORY (INHALATION) at 08:01

## 2024-01-09 RX ADMIN — POTASSIUM IODIDE 5 DROP: 1 SOLUTION ORAL at 09:01

## 2024-01-09 RX ADMIN — IPRATROPIUM BROMIDE AND ALBUTEROL SULFATE 3 ML: 2.5; .5 SOLUTION RESPIRATORY (INHALATION) at 07:01

## 2024-01-09 NOTE — PLAN OF CARE
Problem: Aspiration (Enteral Nutrition)  Goal: Absence of Aspiration Signs and Symptoms  Outcome: Ongoing, Progressing     Problem: Device-Related Complication Risk (Enteral Nutrition)  Goal: Safe, Effective Therapy Delivery  Outcome: Met     Problem: Feeding Intolerance (Enteral Nutrition)  Goal: Feeding Tolerance  Outcome: Ongoing, Progressing

## 2024-01-09 NOTE — CARE UPDATE
Tried harrison-behzad on pt but attempt was failed. He didn't  last 2 min before sats dropped to 90% and RR increased to 34. Pt was taken off and remained on TC @ 30%.

## 2024-01-09 NOTE — PLAN OF CARE
Problem: Impaired Wound Healing  Goal: Optimal Wound Healing  Outcome: Ongoing, Progressing     Problem: Aspiration (Enteral Nutrition)  Goal: Absence of Aspiration Signs and Symptoms  Outcome: Ongoing, Progressing     Problem: Feeding Intolerance (Enteral Nutrition)  Goal: Feeding Tolerance  Outcome: Ongoing, Progressing

## 2024-01-09 NOTE — RESPIRATORY THERAPY
Pt is doing well on trach collar at 30% o2. Pt does not tolerate speaking valve or trach capping at all. Pt has thick creamy secretions and doesn't require a lot of suctioning. Pt has size 6XLT trach in.

## 2024-01-09 NOTE — RESPIRATORY THERAPY
Pts mother requested to try speaking valve on pt again to give him another chance to succeed with it. SV was placed on pt and pt wore it for 4 minutes before it needed to be removed. Pt is still not able to exhale and expel breath causing him to become distressed after only a few minutes. SV was removed and pt is stable at this time.

## 2024-01-09 NOTE — RESPIRATORY THERAPY
Tried pt on speaking valve trial and pt failed after 3 minutes of wearing it. Pts WOB increased and pt was unable to expel air causing Spo2 to decrease and HR to increase. Explained to pts mother that he is not able to tolerate speaking valve or capping of the trach. If pt is unable to bypass trach and use upper airway, it's likely he will have to keep the trach in. Also explained to pts mother that if he has to keep the trach in that she would need to learn how to do trach care/suctioning if she planned on taking him home. Mother showed that she understood what was explained.

## 2024-01-10 LAB — GLUCOSE SERPL-MCNC: 151 MG/DL (ref 70–105)

## 2024-01-10 PROCEDURE — 94761 N-INVAS EAR/PLS OXIMETRY MLT: CPT

## 2024-01-10 PROCEDURE — 11000004 HC SNF PRIVATE

## 2024-01-10 PROCEDURE — 25000003 PHARM REV CODE 250: Performed by: REGISTERED NURSE

## 2024-01-10 PROCEDURE — 99900031 HC PATIENT EDUCATION (STAT)

## 2024-01-10 PROCEDURE — 27000941

## 2024-01-10 PROCEDURE — 63600175 PHARM REV CODE 636 W HCPCS: Performed by: INTERNAL MEDICINE

## 2024-01-10 PROCEDURE — 27000935

## 2024-01-10 PROCEDURE — 25000003 PHARM REV CODE 250: Performed by: INTERNAL MEDICINE

## 2024-01-10 PROCEDURE — 94640 AIRWAY INHALATION TREATMENT: CPT

## 2024-01-10 PROCEDURE — 82962 GLUCOSE BLOOD TEST: CPT

## 2024-01-10 PROCEDURE — 99900035 HC TECH TIME PER 15 MIN (STAT)

## 2024-01-10 PROCEDURE — 25000242 PHARM REV CODE 250 ALT 637 W/ HCPCS: Performed by: INTERNAL MEDICINE

## 2024-01-10 PROCEDURE — 99900026 HC AIRWAY MAINTENANCE (STAT)

## 2024-01-10 PROCEDURE — 27000221 HC OXYGEN, UP TO 24 HOURS

## 2024-01-10 RX ADMIN — BUDESONIDE 0.5 MG: 0.5 INHALANT ORAL at 07:01

## 2024-01-10 RX ADMIN — POTASSIUM IODIDE 5 DROP: 1 SOLUTION ORAL at 08:01

## 2024-01-10 RX ADMIN — IPRATROPIUM BROMIDE AND ALBUTEROL SULFATE 3 ML: 2.5; .5 SOLUTION RESPIRATORY (INHALATION) at 07:01

## 2024-01-10 RX ADMIN — APIXABAN 5 MG: 2.5 TABLET, FILM COATED ORAL at 08:01

## 2024-01-10 RX ADMIN — POTASSIUM IODIDE 5 DROP: 1 SOLUTION ORAL at 05:01

## 2024-01-10 RX ADMIN — LEVETIRACETAM 500 MG: 500 SOLUTION ORAL at 08:01

## 2024-01-10 RX ADMIN — POTASSIUM IODIDE 5 DROP: 1 SOLUTION ORAL at 01:01

## 2024-01-10 RX ADMIN — PANTOPRAZOLE SODIUM 40 MG: 40 GRANULE, DELAYED RELEASE ORAL at 08:01

## 2024-01-10 RX ADMIN — PREDNISONE 10 MG: 10 TABLET ORAL at 08:01

## 2024-01-10 NOTE — SUBJECTIVE & OBJECTIVE
Interval History: No acute events. Currently resting comfortably. Not tolerating capping at all. Afebrile today and vital signs are stable.      Objective:     Vital Signs (Most Recent):  Temp: 99.1 °F (37.3 °C) (01/09/24 1633)  Pulse: 96 (01/09/24 1633)  Resp: (!) 26 (01/09/24 1633)  BP: 111/70 (01/09/24 1633)  SpO2: 100 % (01/09/24 1652) Vital Signs (24h Range):  Temp:  [98.1 °F (36.7 °C)-99.6 °F (37.6 °C)] 99.1 °F (37.3 °C)  Pulse:  [] 96  Resp:  [16-32] 26  SpO2:  [100 %] 100 %  BP: (100-136)/(67-87) 111/70     Weight: 86 kg (189 lb 9.5 oz)  Body mass index is 38.29 kg/m².      Intake/Output Summary (Last 24 hours) at 1/9/2024 1810  Last data filed at 1/9/2024 1215  Gross per 24 hour   Intake 2250 ml   Output --   Net 2250 ml          Physical Exam  Vitals reviewed.   Constitutional:       General: He is not in acute distress.     Appearance: Normal appearance. He is ill-appearing.      Interventions: He is not intubated.     Comments: Tracheostomy PEG   HENT:      Head: Normocephalic and atraumatic.      Right Ear: External ear normal.      Left Ear: External ear normal.      Nose: Nose normal.      Mouth/Throat:      Mouth: Mucous membranes are dry.      Pharynx: Oropharynx is clear.      Comments: Trach in place  Eyes:      Extraocular Movements: Extraocular movements intact.      Conjunctiva/sclera: Conjunctivae normal.      Pupils: Pupils are equal, round, and reactive to light.   Neck:      Comments: trach  Cardiovascular:      Rate and Rhythm: Normal rate.      Heart sounds: Normal heart sounds. No murmur heard.  Pulmonary:      Effort: Pulmonary effort is normal. He is not intubated.      Breath sounds: Normal breath sounds. No wheezing, rhonchi or rales.   Abdominal:      General: Abdomen is flat. Bowel sounds are normal.      Palpations: Abdomen is soft.      Comments: peg   Musculoskeletal:         General: Normal range of motion.      Cervical back: Normal range of motion and neck supple.       Right lower leg: No edema.      Left lower leg: No edema.   Skin:     General: Skin is warm and dry.      Capillary Refill: Capillary refill takes less than 2 seconds.      Coloration: Skin is not pale.   Neurological:      General: No focal deficit present.      Mental Status: He is alert and oriented to person, place, and time.      Comments: Opens eyes spontaneously but does not obey any commands   Psychiatric:         Mood and Affect: Mood normal.         Behavior: Behavior normal.           Review of Systems    Vents:  Vent Mode: CPAP (01/02/24 0500)  Ventilator Initiated: Yes (12/19/23 0450)  Set Rate: 0 BPM (12/26/23 0505)  Vt Set: 450 mL (12/26/23 0755)  Pressure Support:  (PSV max 10/ PSV min 5) (12/26/23 0505)  PEEP/CPAP: 10 cmH20 (01/02/24 0500)  Oxygen Concentration (%): 30 (01/09/24 1633)  Peak Airway Pressure: 19.8 cmH20 (01/02/24 0006)  Plateau Pressure: 341 cmH20 (11/18/23 0950)  Total Ve: 8.6 L/m (12/26/23 0755)  F/VT Ratio<105 (RSBI): (!) 60.61 (01/02/24 0500)    Lines/Drains/Airways       Drain  Duration                  Gastrostomy/Enterostomy midline -- days              Airway  Duration             Adult Surgical Airway 01/04/24 1300 Shiley Extra Large Cuffed Distal 6.0/ 75mm 5 days                    Significant Labs:    CBC/Anemia Profile:  Recent Labs   Lab 01/09/24  0545   WBC 6.90   HGB 12.2*   HCT 38.9*   *   MCV 92.0   RDW 18.7*          Chemistries:  Recent Labs   Lab 01/09/24  0545      K 3.9      CO2 27   BUN 11   CREATININE 0.64*   CALCIUM 9.3         All pertinent labs within the past 24 hours have been reviewed.    Significant Imaging:  I have reviewed all pertinent imaging results/findings within the past 24 hours.

## 2024-01-10 NOTE — PLAN OF CARE
Problem: Communication Impairment (Mechanical Ventilation, Invasive)  Goal: Effective Communication  Outcome: Ongoing, Progressing     Problem: Device-Related Complication Risk (Mechanical Ventilation, Invasive)  Goal: Optimal Device Function  Outcome: Ongoing, Progressing     Problem: Inability to Wean (Mechanical Ventilation, Invasive)  Goal: Mechanical Ventilation Liberation  Outcome: Ongoing, Progressing     Problem: Communication Impairment (Artificial Airway)  Goal: Effective Communication  Outcome: Ongoing, Progressing     Problem: Device-Related Complication Risk (Artificial Airway)  Goal: Optimal Device Function  Outcome: Ongoing, Progressing     Problem: Gas Exchange Impaired  Goal: Optimal Gas Exchange  Outcome: Ongoing, Progressing     Problem: Airway Clearance Ineffective  Goal: Effective Airway Clearance  Outcome: Ongoing, Progressing

## 2024-01-10 NOTE — PLAN OF CARE
PATIENT O2 SAT ON 30% TRACH COLLAR %. PATIENT FAILED BOTH SPEAKING VALVE TRIALS EARLIER TODAY AND MOTHER WAS NOTIFIED THAT SHE WOULD NEED TO LEARN HOW TO TAKE CARE OF TRACH AND SUCTION IN ORDER FOR HER TO TAKE HIM HOME.

## 2024-01-10 NOTE — RESPIRATORY THERAPY
5054    Pt mother performed suction    With resp.therapist observing and coaching    Pt mother did a good job    Will continue to educate

## 2024-01-10 NOTE — PROGRESS NOTES
[facility] - Medical Surgical Unit  Pulmonology  Progress Note    Patient Name: Blue Abdul  MRN: [id number]  Admission Date: 11/9/2023  Hospital Length of Stay: 61 days  Code Status: Full Code  Attending Provider: Ramiro Flores MD  Primary Care Provider: Nati Doyle FNP   Principal Problem: Acute hypercapnic respiratory failure    Subjective:     Interval History: No acute events. Currently resting comfortably. Not tolerating capping at all. Afebrile today and vital signs are stable.      Objective:     Vital Signs (Most Recent):  Temp: 99.1 °F (37.3 °C) (01/09/24 1633)  Pulse: 96 (01/09/24 1633)  Resp: (!) 26 (01/09/24 1633)  BP: 111/70 (01/09/24 1633)  SpO2: 100 % (01/09/24 1652) Vital Signs (24h Range):  Temp:  [98.1 °F (36.7 °C)-99.6 °F (37.6 °C)] 99.1 °F (37.3 °C)  Pulse:  [] 96  Resp:  [16-32] 26  SpO2:  [100 %] 100 %  BP: (100-136)/(67-87) 111/70     Weight: 86 kg (189 lb 9.5 oz)  Body mass index is 38.29 kg/m².      Intake/Output Summary (Last 24 hours) at 1/9/2024 1810  Last data filed at 1/9/2024 1215  Gross per 24 hour   Intake 2250 ml   Output --   Net 2250 ml          Physical Exam  Vitals reviewed.   Constitutional:       General: He is not in acute distress.     Appearance: Normal appearance. He is ill-appearing.      Interventions: He is not intubated.     Comments: Tracheostomy PEG   HENT:      Head: Normocephalic and atraumatic.      Right Ear: External ear normal.      Left Ear: External ear normal.      Nose: Nose normal.      Mouth/Throat:      Mouth: Mucous membranes are dry.      Pharynx: Oropharynx is clear.      Comments: Trach in place  Eyes:      Extraocular Movements: Extraocular movements intact.      Conjunctiva/sclera: Conjunctivae normal.      Pupils: Pupils are equal, round, and reactive to light.   Neck:      Comments: trach  Cardiovascular:      Rate and Rhythm: Normal rate.      Heart sounds: Normal heart sounds. No murmur  heard.  Pulmonary:      Effort: Pulmonary effort is normal. He is not intubated.      Breath sounds: Normal breath sounds. No wheezing, rhonchi or rales.   Abdominal:      General: Abdomen is flat. Bowel sounds are normal.      Palpations: Abdomen is soft.      Comments: peg   Musculoskeletal:         General: Normal range of motion.      Cervical back: Normal range of motion and neck supple.      Right lower leg: No edema.      Left lower leg: No edema.   Skin:     General: Skin is warm and dry.      Capillary Refill: Capillary refill takes less than 2 seconds.      Coloration: Skin is not pale.   Neurological:      General: No focal deficit present.      Mental Status: He is alert and oriented to person, place, and time.      Comments: Opens eyes spontaneously but does not obey any commands   Psychiatric:         Mood and Affect: Mood normal.         Behavior: Behavior normal.           Review of Systems    Vents:  Vent Mode: CPAP (01/02/24 0500)  Ventilator Initiated: Yes (12/19/23 0450)  Set Rate: 0 BPM (12/26/23 0505)  Vt Set: 450 mL (12/26/23 0755)  Pressure Support:  (PSV max 10/ PSV min 5) (12/26/23 0505)  PEEP/CPAP: 10 cmH20 (01/02/24 0500)  Oxygen Concentration (%): 30 (01/09/24 1633)  Peak Airway Pressure: 19.8 cmH20 (01/02/24 0006)  Plateau Pressure: 341 cmH20 (11/18/23 0950)  Total Ve: 8.6 L/m (12/26/23 0755)  F/VT Ratio<105 (RSBI): (!) 60.61 (01/02/24 0500)    Lines/Drains/Airways       Drain  Duration                  Gastrostomy/Enterostomy midline -- days              Airway  Duration             Adult Surgical Airway 01/04/24 1300 Shiley Extra Large Cuffed Distal 6.0/ 75mm 5 days                    Significant Labs:    CBC/Anemia Profile:  Recent Labs   Lab 01/09/24  0545   WBC 6.90   HGB 12.2*   HCT 38.9*   *   MCV 92.0   RDW 18.7*          Chemistries:  Recent Labs   Lab 01/09/24  0545      K 3.9      CO2 27   BUN 11   CREATININE 0.64*   CALCIUM 9.3         All pertinent labs  within the past 24 hours have been reviewed.    Significant Imaging:  I have reviewed all pertinent imaging results/findings within the past 24 hours.  Assessment/Plan:     Neuro  Seizures  No recent seizure activity  Patient is on keppra 500 mg bid    Pulmonary  * Acute hypercapnic respiratory failure  Currently on AVAPS 6 hours bid and doing ok  Continue to wean as tolerated  Sputum culture with heavy growth MRSA so Started linezolid bid for 7 days  11/22- afebrile. Will start some trach collar today  12/13- Doing well with trach collar. Plan to try to get to 16 hours of trach collar and will rest on avaps overnight  12/19- more purulent sputum production. Prelim culture with staph. I am going to start linezolid for 7 days. F/U culture and shape therapy accordingly  Doing well with trials. Going to increase as tolerated  1/9 doing good with trach collar but not tolerating capping. I think some of that is neurologic. I have explained to his mother that he might require the trach permanently    Endocrine  Diabetes mellitus  Accuchecks are ok  Currently on no medications  Can monitor blood sugar and if needed add sliding scale                 Craig Estrada, DO  Pulmonology  [facility] - [facility]

## 2024-01-10 NOTE — RESPIRATORY THERAPY
1010    Resp.therapist had the pt mother perform suction on pt.    Mother was coached and given written material to read and help guide when she goes home.    Mother demonstrated good understanding and willingness to learn    Resp.therapist in room the entire time.    Will let mother do suction again later today if pt needs it.

## 2024-01-10 NOTE — CARE UPDATE
1512    Speaking valve trial done . Pt lasted 2 minutes ..( Failed)    Pt stopped breathing therapist witness this even trying to  pt to breath and mother tried to get pt to breath.    When pt finally took a breath he went into distress with visible grunting and increased RR and HR    When valve was removed pt let out a paul darlene of air and started to calm down. Nurse was notified pre / post trial    Will continue to monitor pt

## 2024-01-10 NOTE — ASSESSMENT & PLAN NOTE
Currently on AVAPS 6 hours bid and doing ok  Continue to wean as tolerated  Sputum culture with heavy growth MRSA so Started linezolid bid for 7 days  11/22- afebrile. Will start some trach collar today  12/13- Doing well with trach collar. Plan to try to get to 16 hours of trach collar and will rest on avaps overnight  12/19- more purulent sputum production. Prelim culture with staph. I am going to start linezolid for 7 days. F/U culture and shape therapy accordingly  Doing well with trials. Going to increase as tolerated  1/9 doing good with trach collar but not tolerating capping. I think some of that is neurologic. I have explained to his mother that he might require the trach permanently

## 2024-01-11 LAB — GLUCOSE SERPL-MCNC: 147 MG/DL (ref 70–105)

## 2024-01-11 PROCEDURE — 27000935

## 2024-01-11 PROCEDURE — 94761 N-INVAS EAR/PLS OXIMETRY MLT: CPT

## 2024-01-11 PROCEDURE — 94640 AIRWAY INHALATION TREATMENT: CPT

## 2024-01-11 PROCEDURE — 25000242 PHARM REV CODE 250 ALT 637 W/ HCPCS: Performed by: INTERNAL MEDICINE

## 2024-01-11 PROCEDURE — 63600175 PHARM REV CODE 636 W HCPCS: Performed by: INTERNAL MEDICINE

## 2024-01-11 PROCEDURE — 99900031 HC PATIENT EDUCATION (STAT)

## 2024-01-11 PROCEDURE — 99900026 HC AIRWAY MAINTENANCE (STAT)

## 2024-01-11 PROCEDURE — 82962 GLUCOSE BLOOD TEST: CPT

## 2024-01-11 PROCEDURE — 27000221 HC OXYGEN, UP TO 24 HOURS

## 2024-01-11 PROCEDURE — 99900035 HC TECH TIME PER 15 MIN (STAT)

## 2024-01-11 PROCEDURE — 25000003 PHARM REV CODE 250: Performed by: INTERNAL MEDICINE

## 2024-01-11 PROCEDURE — 25000003 PHARM REV CODE 250: Performed by: REGISTERED NURSE

## 2024-01-11 PROCEDURE — 27000958

## 2024-01-11 PROCEDURE — 11000004 HC SNF PRIVATE

## 2024-01-11 RX ADMIN — BUDESONIDE 0.5 MG: 0.5 INHALANT ORAL at 08:01

## 2024-01-11 RX ADMIN — POTASSIUM IODIDE 5 DROP: 1 SOLUTION ORAL at 01:01

## 2024-01-11 RX ADMIN — POTASSIUM IODIDE 5 DROP: 1 SOLUTION ORAL at 08:01

## 2024-01-11 RX ADMIN — PANTOPRAZOLE SODIUM 40 MG: 40 GRANULE, DELAYED RELEASE ORAL at 08:01

## 2024-01-11 RX ADMIN — LEVETIRACETAM 500 MG: 500 SOLUTION ORAL at 08:01

## 2024-01-11 RX ADMIN — PREDNISONE 10 MG: 10 TABLET ORAL at 08:01

## 2024-01-11 RX ADMIN — APIXABAN 5 MG: 2.5 TABLET, FILM COATED ORAL at 08:01

## 2024-01-11 RX ADMIN — IPRATROPIUM BROMIDE AND ALBUTEROL SULFATE 3 ML: 2.5; .5 SOLUTION RESPIRATORY (INHALATION) at 08:01

## 2024-01-11 RX ADMIN — IPRATROPIUM BROMIDE AND ALBUTEROL SULFATE 3 ML: 2.5; .5 SOLUTION RESPIRATORY (INHALATION) at 07:01

## 2024-01-11 RX ADMIN — POTASSIUM IODIDE 5 DROP: 1 SOLUTION ORAL at 04:01

## 2024-01-11 RX ADMIN — BUDESONIDE 0.5 MG: 0.5 INHALANT ORAL at 07:01

## 2024-01-11 NOTE — CARE UPDATE
[facility] - [facility] Surgical Unit - Swing Bed   Interdisciplinary Team Meeting    Patient: Blue Abdul   Today's Date: 1/11/2024   Estimated D/C Date:         Physician: Ramiro Flores MD Nurse Practitioner:  nida   [facility] Unit Director: Hollykathy Carrillo   : Anita Krause Physical/Occupational Therapy: Jadon Mark   Speech Therapy:  Monica Anderson Activity Therapy: nida   Nursing: Holly Carrillo  Respiratory: Tona Beckwith Dietary: Kuldeep Boggs  Other: na     Nurse  New Symptoms/Problems: na  Last Bowel Movement: 01/10/24   Urine: incontinent  Rojas: No  Bowel: incontinent   Constipated: No  Diarrhea: No   Isolation: Yes  Wound Care: No  Wound Location/Tx: na  Cognition: patient unable to participate  Aspiration Precautions: Yes  Comment(s): nida    Respiratory   O2 Device: Trach Collar  O2 Flow: 30%  SpO2: 100%  Neb Tx: No  Comment(s): teaching mother suctioning and tc care      Dietary  Nutrition:  Peptamin af at 50cc hr and flush   Comment(s): teaching mother peg tube care and feeding     Speech Therapy  Speech/Swallowing:  NPO  Comment(s): nida    Physical Therapy  Gait/Assistive Device: 0 ELOS: Plan to DC     Transfers: Activity did not occur  Bed Mobility: Total Assistance Range of Motion/Restrictions: NA  Comment(s): NIDA     Occupational Therapy  Eating/Grooming: Total Assistance Toileting: Total Assistance   Bathing: Total Assistance Dressing (Upper Body): Total Assistance   Dressing (Lower Body): Total Assistance Comment(s): NIDA     Pharmacy  Medication Changes (see all MD orders in chart): No  Labs Reviewed: Yes  New Lab Orders: No  Comment(s): NIDA      Tx Plan/Recommendations reviewed with family and/or patient on (date) 1/11/24.  Additional family Conference/Training: ONGOING   D/C Plan/Recommendations: Home with family AND HH CRICKET:   Comment(s): NIDA

## 2024-01-11 NOTE — PROGRESS NOTES
Wt: 191#  Pt moving towards D/C.  Last BM 1/10.  No new lab or problems.  TF formula is appropriate and rate and flushes are infusing as ordered.  Rec 1. Start Bolus feedings Isosource 1.5 125 ml per feeding x 24 hours then inc to 250ml per feeding 4 times per day via PEG, flush with 125ml water before and after feedings as tolerated.  This TF provides: 1500 kcal (28kcal/kg AdjBW), 68g PRO1.3/per kg AdjBW, 1764ml free water (33ml per kg FW ABW, 20ml per kg ABW), 2000ml total fluids.  Feeding times 0600, 1100, 1600, 2000 2. Elevate HOB for feedings 3. Nursing begin teaching his mother to give feedings.

## 2024-01-11 NOTE — PLAN OF CARE
[facility]  Discharge Reassessment    Primary Care Provider: Nati Doyle FNP    Expected Discharge Date:     Reassessment (most recent)       Discharge Reassessment - 01/11/24 1624          Discharge Reassessment    Assessment Type Discharge Planning Brief Assessment     Did the patient's condition or plan change since previous assessment? No     Discharge Plan discussed with: Parent(s)     Name(s) and Number(s) ENA     Communicated CRICKET with patient/caregiver Date not available/Unable to determine     Discharge Plan A Home with family;Home Health     DME Needed Upon Discharge  feeding device;hospital bed;lift device;nutrition supplies;oxygen;respiratory supplies;suction machine     Transition of Care Barriers Mobility     Why the patient remains in the hospital Requires continued medical care        Post-Acute Status    Post-Acute Authorization Home Health     Coverage MEDICARE     Discharge Delays None known at this time                     UPDATED ENA AND BEGIN TEACHING CARE NEEDED TO TAKE PT HOME WILL REFER TO HOME HEALTH OF CHOICE AND WILL OBTAIN ALL NEEDED DME  DISCHARGE DATE UNDETERMINED

## 2024-01-12 LAB — GLUCOSE SERPL-MCNC: 150 MG/DL (ref 70–105)

## 2024-01-12 PROCEDURE — 25000242 PHARM REV CODE 250 ALT 637 W/ HCPCS: Performed by: INTERNAL MEDICINE

## 2024-01-12 PROCEDURE — 99900026 HC AIRWAY MAINTENANCE (STAT)

## 2024-01-12 PROCEDURE — A6212 FOAM DRG <=16 SQ IN W/BORDER: HCPCS

## 2024-01-12 PROCEDURE — 27200966 HC CLOSED SUCTION SYSTEM

## 2024-01-12 PROCEDURE — 63600175 PHARM REV CODE 636 W HCPCS: Performed by: INTERNAL MEDICINE

## 2024-01-12 PROCEDURE — 27000935

## 2024-01-12 PROCEDURE — 82962 GLUCOSE BLOOD TEST: CPT

## 2024-01-12 PROCEDURE — 25000003 PHARM REV CODE 250: Performed by: INTERNAL MEDICINE

## 2024-01-12 PROCEDURE — 99900035 HC TECH TIME PER 15 MIN (STAT)

## 2024-01-12 PROCEDURE — 11000004 HC SNF PRIVATE

## 2024-01-12 PROCEDURE — 27000221 HC OXYGEN, UP TO 24 HOURS

## 2024-01-12 PROCEDURE — 94640 AIRWAY INHALATION TREATMENT: CPT

## 2024-01-12 PROCEDURE — 25000003 PHARM REV CODE 250: Performed by: REGISTERED NURSE

## 2024-01-12 PROCEDURE — 94761 N-INVAS EAR/PLS OXIMETRY MLT: CPT

## 2024-01-12 PROCEDURE — 27000958

## 2024-01-12 RX ADMIN — PANTOPRAZOLE SODIUM 40 MG: 40 GRANULE, DELAYED RELEASE ORAL at 09:01

## 2024-01-12 RX ADMIN — APIXABAN 5 MG: 2.5 TABLET, FILM COATED ORAL at 08:01

## 2024-01-12 RX ADMIN — LEVETIRACETAM 500 MG: 500 SOLUTION ORAL at 09:01

## 2024-01-12 RX ADMIN — LEVETIRACETAM 500 MG: 500 SOLUTION ORAL at 08:01

## 2024-01-12 RX ADMIN — BUDESONIDE 0.5 MG: 0.5 INHALANT ORAL at 07:01

## 2024-01-12 RX ADMIN — APIXABAN 5 MG: 2.5 TABLET, FILM COATED ORAL at 09:01

## 2024-01-12 RX ADMIN — PREDNISONE 10 MG: 10 TABLET ORAL at 09:01

## 2024-01-12 RX ADMIN — POTASSIUM IODIDE 5 DROP: 1 SOLUTION ORAL at 09:01

## 2024-01-12 RX ADMIN — PANTOPRAZOLE SODIUM 40 MG: 40 GRANULE, DELAYED RELEASE ORAL at 08:01

## 2024-01-12 RX ADMIN — IPRATROPIUM BROMIDE AND ALBUTEROL SULFATE 3 ML: 2.5; .5 SOLUTION RESPIRATORY (INHALATION) at 07:01

## 2024-01-12 RX ADMIN — POTASSIUM IODIDE 5 DROP: 1 SOLUTION ORAL at 08:01

## 2024-01-12 RX ADMIN — POTASSIUM IODIDE 5 DROP: 1 SOLUTION ORAL at 05:01

## 2024-01-12 RX ADMIN — POTASSIUM IODIDE 5 DROP: 1 SOLUTION ORAL at 01:01

## 2024-01-12 NOTE — PLAN OF CARE
PATIENTS O2 SAT ON 30% TRACH COLLAR %. WE ARE TEACHING MOM HOW TO DO TRACH CARE AND SUCTION. PATIENT IS TOLERATING TREATMENT WELL.

## 2024-01-12 NOTE — PLAN OF CARE
Problem: Adult Inpatient Plan of Care  Goal: Plan of Care Review  Outcome: Ongoing, Progressing  Goal: Patient-Specific Goal (Individualized)  Outcome: Ongoing, Progressing  Goal: Absence of Hospital-Acquired Illness or Injury  Outcome: Ongoing, Progressing  Goal: Optimal Comfort and Wellbeing  Outcome: Ongoing, Progressing     Problem: Airway Clearance Ineffective  Goal: Effective Airway Clearance  Outcome: Ongoing, Progressing

## 2024-01-12 NOTE — PLAN OF CARE
Problem: Adult Inpatient Plan of Care  Goal: Plan of Care Review  Outcome: Ongoing, Progressing     Problem: Adult Inpatient Plan of Care  Goal: Absence of Hospital-Acquired Illness or Injury  Outcome: Ongoing, Progressing     Problem: Nutrition Impairment (Mechanical Ventilation, Invasive)  Goal: Optimal Nutrition Delivery  Outcome: Ongoing, Progressing     Problem: Diabetes Comorbidity  Goal: Blood Glucose Level Within Targeted Range  Outcome: Ongoing, Progressing     Problem: Feeding Intolerance (Enteral Nutrition)  Goal: Feeding Tolerance  Outcome: Ongoing, Progressing      Satnam Leone 1959     Office/Outpatient Visit    Visit Date: Mon, May 14, 2018 12:31 pm    Provider: Caitlin Lee N.P. (Assistant: Rosalba Hyman MA)    Location: [facility]        Electronically signed by Caitlin Lee N.P. on  05/14/2018 01:01:59 PM                             SUBJECTIVE:        CC:     Mr. Leone is a 58 year old White male.  Patient complains of sinus/chest congestion and sinus drainage.          HPI:         Patient complains of upper respiratory illness.  These have been present for the past 2 weeks.  The symptoms include chest congestion, cough, dizziness, headache, nasal congestion, sinus pain/pressure and sputum production.  He reports recent exposure to illness from family members.  He has already tried to relieve the symptoms with Tussin DM.  Medical history is significant for allergies and smoking.      ROS:     CONSTITUTIONAL:  Negative for chills, fatigue and fever.      E/N/T:  Positive for ear pain, nasal congestion and sinus pressure.      CARDIOVASCULAR:  Negative for chest pain.      RESPIRATORY:  Positive for recent cough.   Negative for dyspnea or frequent wheezing.      GASTROINTESTINAL:  Negative for abdominal pain, constipation, diarrhea, heartburn, nausea and vomiting.      GENITOURINARY:  Negative for dysuria and change in urine stream.      MUSCULOSKELETAL:  Negative for arthralgias and myalgias.      INTEGUMENTARY:  Negative for pruritis and rash.      NEUROLOGICAL:  Positive for headaches.   Negative for dizziness or paresthesias.      ENDOCRINE:  Negative for hair loss, polydipsia and polyphagia.      ALLERGIC/IMMUNOLOGIC:  Negative for seasonal allergies.      PSYCHIATRIC:  Negative for anxiety, depression and suicidal thoughts.          PMH/FMH/SH:     Last Reviewed on 5/14/2018 12:47 PM by Caitlin Lee    Past Medical History:             PREVENTIVE HEALTH MAINTENANCE             COLORECTAL CANCER SCREENING: declines colorectal  cancer screening, understands reason for testing         Surgical History:         Positive for    Splenectomy and    spinal fusion mid ;;         Family History:         Positive for Hypertension ( father ).  Father:  at age 58;  Hypertension     Mother: Healthy         Social History:     Occupation: [employer]     Marital Status:      Children: None         Tobacco/Alcohol/Supplements:     Last Reviewed on 2018 12:37 PM by Rosalba Hyman    Tobacco: Current Smoker: He currently smokes every day, 1/2 to 1 pack per day.              Current Problems:     Last Reviewed on 2018 12:47 PM by Caitlin Lee    Hyperlipidemia     Hypertension     Atypical skin lesion     History of splenectomy     Cigarette smoking     Acute sinusitis, other         Immunizations:     Pneumococcal, 23-valent IM/SC (adult and pt >=2yr) 2006         Allergies:     Last Reviewed on 2018 12:31 PM by Rosalba Hyman      No Known Drug Allergies.         Current Medications:     Last Reviewed on 2018 12:47 PM by Caitlin Lee    Lisinopril 20mg Tablet 1 tab daily     Pravastatin 20mg Tablet 1 tab q day hs         OBJECTIVE:        Vitals:         Current: 2018 12:36:40 PM    Ht:  6 ft, 1 in;  Wt: 230.4 lbs;  BMI: 30.4    T: 97.6 F (oral);  BP: 133/78 mm Hg (left arm, sitting);  P: 86 bpm (left arm (BP Cuff), sitting);  sCr: 0.75 mg/dL;  GFR: 121.88        Exams:     PHYSICAL EXAM:     GENERAL: Vitals recorded well developed, well nourished;  no apparent distress;     E/N/T: EARS: external auditory canal normal bilaterally;  bilateral TMs are normal;  NOSE: bilateral frontal sinus tenderness present; OROPHARYNX:  normal mucosa, dentition, gingiva, and posterior pharynx;     NECK: range of motion is normal;     RESPIRATORY: normal appearance and symmetric expansion of chest wall; normal respiratory rate and pattern with no distress; expiratory wheezes in the LLL and RLL;      CARDIOVASCULAR: normal rate; rhythm is regular;  no edema;     LYMPHATIC: bilateral submandibular nodes ( fluctuant, tender );  no supraclavicular nodes;     MUSCULOSKELETAL: normal gait; normal range of motion of all major muscle groups; no limb or joint pain with range of motion;     NEUROLOGIC: mental status: alert and oriented x 3;     PSYCHIATRIC: appropriate affect and demeanor; normal speech pattern; normal thought and perception;         ASSESSMENT           461.8   J01.90  Acute sinusitis, other              DDx:     305.1   F17.200  Cigarette smoking              DDx:         ORDERS:         Meds Prescribed:       Augmentin (Amoxicillin/Clavulanate) 875mg/125mg Tablet Take 1 tablet BID X 5days  #10 (Ten) tablet(s) Refills: 0       Cetirizine HCl 10mg Tablet 1-2 tabs daily  #45 (Forty Five) tablet(s) Refills: 0       Fluticasone Propionate 50mcg/1actuation Nasal Spray 1 spray each nostil daily  #3 (Three) gm Refills: 1       Ocean (Sodium Chloride) 0.65% Nasal Solution 1 squirt each nostril prn concgestion.  #50 (Fifty) ml Refills: 0       Bromfed-DM (Brompheniramine/Dextromethorphan/Pseudoephedrine) Syrup 1  to 2  tsp po every 4-6 hrs prn cough/congestion.  #240 ([place]) ml Refills: 0         Other Orders:       4004F  Pt scrnd tobacco use rcvd tobacco cessation talk  (In-House)                   PLAN:          Acute sinusitis, other         RECOMMENDATIONS given include: Use a humidifier at night.            Prescriptions:       Augmentin (Amoxicillin/Clavulanate) 875mg/125mg Tablet Take 1 tablet BID X 5days  #10 (Ten) tablet(s) Refills: 0       Cetirizine HCl 10mg Tablet 1-2 tabs daily  #45 (Forty Five) tablet(s) Refills: 0       Fluticasone Propionate 50mcg/1actuation Nasal Spray 1 spray each nostil daily  #3 (Three) gm Refills: 1       Ocean (Sodium Chloride) 0.65% Nasal Solution 1 squirt each nostril prn concgestion.  #50 (Fifty) ml Refills: 0       Bromfed-DM  (Brompheniramine/Dextromethorphan/Pseudoephedrine) Syrup 1  to 2  tsp po every 4-6 hrs prn cough/congestion.  #240 ([place] Forty) ml Refills: 0           Patient Education Handouts:       Allergies        [facility] Medication Compliance           Cigarette smoking         RECOMMENDATIONS given include: Counseled on smoking cessation and advised of the benefits to patient's health if he were to stop smoking. Counseling for less than 3 minutes.            Orders:       4004F  Pt scrnd tobacco use rcvd tobacco cessation talk  (In-House)               Patient Recommendations:        For  Cigarette smoking:         Stop smoking.              CHARGE CAPTURE           **Please note: ICD descriptions below are intended for billing purposes only and may not represent clinical diagnoses**        Primary Diagnosis:         461.8 Acute sinusitis, other            J01.90    Acute sinusitis, unspecified              Orders:          79590   Office/outpatient visit; established patient, level 3  (In-House)           305.1 Cigarette smoking            F17.200    Nicotine dependence, unspecified, uncomplicated              Orders:          4004F   Pt scrnd tobacco use rcvd tobacco cessation talk  (In-House)

## 2024-01-13 LAB — GLUCOSE SERPL-MCNC: 127 MG/DL (ref 70–105)

## 2024-01-13 PROCEDURE — 63600175 PHARM REV CODE 636 W HCPCS: Performed by: INTERNAL MEDICINE

## 2024-01-13 PROCEDURE — 82962 GLUCOSE BLOOD TEST: CPT

## 2024-01-13 PROCEDURE — 99900035 HC TECH TIME PER 15 MIN (STAT)

## 2024-01-13 PROCEDURE — 94761 N-INVAS EAR/PLS OXIMETRY MLT: CPT

## 2024-01-13 PROCEDURE — 11000004 HC SNF PRIVATE

## 2024-01-13 PROCEDURE — 25000242 PHARM REV CODE 250 ALT 637 W/ HCPCS: Performed by: INTERNAL MEDICINE

## 2024-01-13 PROCEDURE — 27000941

## 2024-01-13 PROCEDURE — 99900031 HC PATIENT EDUCATION (STAT)

## 2024-01-13 PROCEDURE — 25000003 PHARM REV CODE 250: Performed by: INTERNAL MEDICINE

## 2024-01-13 PROCEDURE — 27000221 HC OXYGEN, UP TO 24 HOURS

## 2024-01-13 PROCEDURE — 99900026 HC AIRWAY MAINTENANCE (STAT)

## 2024-01-13 PROCEDURE — 27000935

## 2024-01-13 PROCEDURE — 25000003 PHARM REV CODE 250: Performed by: REGISTERED NURSE

## 2024-01-13 PROCEDURE — 94640 AIRWAY INHALATION TREATMENT: CPT

## 2024-01-13 RX ADMIN — PANTOPRAZOLE SODIUM 40 MG: 40 GRANULE, DELAYED RELEASE ORAL at 09:01

## 2024-01-13 RX ADMIN — POTASSIUM IODIDE 5 DROP: 1 SOLUTION ORAL at 04:01

## 2024-01-13 RX ADMIN — POTASSIUM IODIDE 5 DROP: 1 SOLUTION ORAL at 09:01

## 2024-01-13 RX ADMIN — BUDESONIDE 0.5 MG: 0.5 INHALANT ORAL at 07:01

## 2024-01-13 RX ADMIN — PREDNISONE 10 MG: 10 TABLET ORAL at 09:01

## 2024-01-13 RX ADMIN — IPRATROPIUM BROMIDE AND ALBUTEROL SULFATE 3 ML: 2.5; .5 SOLUTION RESPIRATORY (INHALATION) at 07:01

## 2024-01-13 RX ADMIN — LEVETIRACETAM 500 MG: 500 SOLUTION ORAL at 09:01

## 2024-01-13 RX ADMIN — APIXABAN 5 MG: 2.5 TABLET, FILM COATED ORAL at 09:01

## 2024-01-13 RX ADMIN — POTASSIUM IODIDE 5 DROP: 1 SOLUTION ORAL at 01:01

## 2024-01-13 NOTE — PLAN OF CARE
Problem: Adult Inpatient Plan of Care  Goal: Plan of Care Review  Outcome: Ongoing, Progressing     Problem: Feeding Intolerance (Enteral Nutrition)  Goal: Feeding Tolerance  Outcome: Ongoing, Progressing

## 2024-01-13 NOTE — PLAN OF CARE
Problem: Device-Related Complication Risk (Mechanical Ventilation, Invasive)  Goal: Optimal Device Function  Outcome: Ongoing, Progressing     Problem: Communication Impairment (Artificial Airway)  Goal: Effective Communication  Outcome: Ongoing, Progressing     Problem: Device-Related Complication Risk (Artificial Airway)  Goal: Optimal Device Function  Outcome: Ongoing, Progressing     Problem: Gas Exchange Impaired  Goal: Optimal Gas Exchange  Outcome: Ongoing, Progressing     Problem: Airway Clearance Ineffective  Goal: Effective Airway Clearance  Outcome: Ongoing, Progressing

## 2024-01-13 NOTE — PLAN OF CARE
Problem: Device-Related Complication Risk (Mechanical Ventilation, Invasive)  Goal: Optimal Device Function  Outcome: Ongoing, Progressing     Problem: Nutrition Impairment (Mechanical Ventilation, Invasive)  Goal: Optimal Nutrition Delivery  Outcome: Ongoing, Progressing     Problem: Communication Impairment (Artificial Airway)  Goal: Effective Communication  Outcome: Ongoing, Progressing     Problem: Device-Related Complication Risk (Artificial Airway)  Goal: Optimal Device Function  Outcome: Ongoing, Progressing     Problem: Skin and Tissue Injury (Artificial Airway)  Goal: Absence of Device-Related Skin or Tissue Injury  Outcome: Ongoing, Progressing     Problem: Gas Exchange Impaired  Goal: Optimal Gas Exchange  Outcome: Ongoing, Progressing     Problem: Breathing Pattern Ineffective  Goal: Effective Breathing Pattern  Outcome: Ongoing, Progressing     Problem: Airway Clearance Ineffective  Goal: Effective Airway Clearance  Outcome: Ongoing, Progressing

## 2024-01-14 LAB — GLUCOSE SERPL-MCNC: 123 MG/DL (ref 70–105)

## 2024-01-14 PROCEDURE — 99900026 HC AIRWAY MAINTENANCE (STAT)

## 2024-01-14 PROCEDURE — 94761 N-INVAS EAR/PLS OXIMETRY MLT: CPT

## 2024-01-14 PROCEDURE — 63600175 PHARM REV CODE 636 W HCPCS: Performed by: INTERNAL MEDICINE

## 2024-01-14 PROCEDURE — 25000003 PHARM REV CODE 250: Performed by: REGISTERED NURSE

## 2024-01-14 PROCEDURE — 94640 AIRWAY INHALATION TREATMENT: CPT

## 2024-01-14 PROCEDURE — 27000221 HC OXYGEN, UP TO 24 HOURS

## 2024-01-14 PROCEDURE — 11000004 HC SNF PRIVATE

## 2024-01-14 PROCEDURE — 27000941

## 2024-01-14 PROCEDURE — 25000242 PHARM REV CODE 250 ALT 637 W/ HCPCS: Performed by: INTERNAL MEDICINE

## 2024-01-14 PROCEDURE — 82962 GLUCOSE BLOOD TEST: CPT

## 2024-01-14 PROCEDURE — 27000935

## 2024-01-14 PROCEDURE — 99900035 HC TECH TIME PER 15 MIN (STAT)

## 2024-01-14 PROCEDURE — 25000003 PHARM REV CODE 250: Performed by: INTERNAL MEDICINE

## 2024-01-14 PROCEDURE — 99900031 HC PATIENT EDUCATION (STAT)

## 2024-01-14 RX ADMIN — BUDESONIDE 0.5 MG: 0.5 INHALANT ORAL at 07:01

## 2024-01-14 RX ADMIN — IPRATROPIUM BROMIDE AND ALBUTEROL SULFATE 3 ML: 2.5; .5 SOLUTION RESPIRATORY (INHALATION) at 07:01

## 2024-01-14 RX ADMIN — PANTOPRAZOLE SODIUM 40 MG: 40 GRANULE, DELAYED RELEASE ORAL at 08:01

## 2024-01-14 RX ADMIN — LEVETIRACETAM 500 MG: 500 SOLUTION ORAL at 09:01

## 2024-01-14 RX ADMIN — POTASSIUM IODIDE 5 DROP: 1 SOLUTION ORAL at 05:01

## 2024-01-14 RX ADMIN — LEVETIRACETAM 500 MG: 500 SOLUTION ORAL at 08:01

## 2024-01-14 RX ADMIN — POTASSIUM IODIDE 5 DROP: 1 SOLUTION ORAL at 08:01

## 2024-01-14 RX ADMIN — POLYETHYLENE GLYCOL 3350 17 G: 17 POWDER, FOR SOLUTION ORAL at 08:01

## 2024-01-14 RX ADMIN — PREDNISONE 10 MG: 10 TABLET ORAL at 09:01

## 2024-01-14 RX ADMIN — APIXABAN 5 MG: 2.5 TABLET, FILM COATED ORAL at 09:01

## 2024-01-14 RX ADMIN — PANTOPRAZOLE SODIUM 40 MG: 40 GRANULE, DELAYED RELEASE ORAL at 09:01

## 2024-01-14 RX ADMIN — POTASSIUM IODIDE 5 DROP: 1 SOLUTION ORAL at 09:01

## 2024-01-14 RX ADMIN — APIXABAN 5 MG: 2.5 TABLET, FILM COATED ORAL at 08:01

## 2024-01-14 RX ADMIN — POTASSIUM IODIDE 5 DROP: 1 SOLUTION ORAL at 01:01

## 2024-01-14 NOTE — RESPIRATORY THERAPY
Pt mother completed trach care & suction    Pt deb well    Mother did well with continued coaching    Resp. Therapist present and observing    %

## 2024-01-14 NOTE — PLAN OF CARE
Problem: Communication Impairment (Artificial Airway)  Goal: Effective Communication  Outcome: Ongoing, Progressing     Problem: Device-Related Complication Risk (Artificial Airway)  Goal: Optimal Device Function  Outcome: Ongoing, Progressing     Problem: Gas Exchange Impaired  Goal: Optimal Gas Exchange  Outcome: Ongoing, Progressing     Problem: Breathing Pattern Ineffective  Goal: Effective Breathing Pattern  Outcome: Ongoing, Progressing     Problem: Airway Clearance Ineffective  Goal: Effective Airway Clearance  Outcome: Ongoing, Progressing

## 2024-01-15 LAB — GLUCOSE SERPL-MCNC: 141 MG/DL (ref 70–105)

## 2024-01-15 PROCEDURE — 11000004 HC SNF PRIVATE

## 2024-01-15 PROCEDURE — 99900035 HC TECH TIME PER 15 MIN (STAT)

## 2024-01-15 PROCEDURE — 94640 AIRWAY INHALATION TREATMENT: CPT

## 2024-01-15 PROCEDURE — 27000941

## 2024-01-15 PROCEDURE — 27000935

## 2024-01-15 PROCEDURE — 25000242 PHARM REV CODE 250 ALT 637 W/ HCPCS: Performed by: INTERNAL MEDICINE

## 2024-01-15 PROCEDURE — 25000003 PHARM REV CODE 250: Performed by: INTERNAL MEDICINE

## 2024-01-15 PROCEDURE — 94761 N-INVAS EAR/PLS OXIMETRY MLT: CPT

## 2024-01-15 PROCEDURE — 82962 GLUCOSE BLOOD TEST: CPT

## 2024-01-15 PROCEDURE — 25000003 PHARM REV CODE 250: Performed by: REGISTERED NURSE

## 2024-01-15 PROCEDURE — 63600175 PHARM REV CODE 636 W HCPCS: Performed by: INTERNAL MEDICINE

## 2024-01-15 PROCEDURE — 99900031 HC PATIENT EDUCATION (STAT)

## 2024-01-15 PROCEDURE — 27000221 HC OXYGEN, UP TO 24 HOURS

## 2024-01-15 PROCEDURE — 99900026 HC AIRWAY MAINTENANCE (STAT)

## 2024-01-15 RX ADMIN — PREDNISONE 10 MG: 10 TABLET ORAL at 08:01

## 2024-01-15 RX ADMIN — IPRATROPIUM BROMIDE AND ALBUTEROL SULFATE 3 ML: 2.5; .5 SOLUTION RESPIRATORY (INHALATION) at 07:01

## 2024-01-15 RX ADMIN — APIXABAN 5 MG: 2.5 TABLET, FILM COATED ORAL at 08:01

## 2024-01-15 RX ADMIN — PANTOPRAZOLE SODIUM 40 MG: 40 GRANULE, DELAYED RELEASE ORAL at 08:01

## 2024-01-15 RX ADMIN — BUDESONIDE 0.5 MG: 0.5 INHALANT ORAL at 07:01

## 2024-01-15 RX ADMIN — LEVETIRACETAM 500 MG: 500 SOLUTION ORAL at 08:01

## 2024-01-15 RX ADMIN — APIXABAN 5 MG: 2.5 TABLET, FILM COATED ORAL at 09:01

## 2024-01-15 RX ADMIN — POTASSIUM IODIDE 5 DROP: 1 SOLUTION ORAL at 08:01

## 2024-01-15 RX ADMIN — POTASSIUM IODIDE 5 DROP: 1 SOLUTION ORAL at 09:01

## 2024-01-15 RX ADMIN — PANTOPRAZOLE SODIUM 40 MG: 40 GRANULE, DELAYED RELEASE ORAL at 09:01

## 2024-01-15 RX ADMIN — LEVETIRACETAM 500 MG: 500 SOLUTION ORAL at 09:01

## 2024-01-15 RX ADMIN — POTASSIUM IODIDE 5 DROP: 1 SOLUTION ORAL at 05:01

## 2024-01-15 RX ADMIN — POTASSIUM IODIDE 5 DROP: 1 SOLUTION ORAL at 01:01

## 2024-01-15 NOTE — CARE UPDATE
Pts mother performed trach care and sterile suctioning on pt under respiratory's supervision. Pts mother does fine with trach care, but needs to work on being a little quicker with suctioning.

## 2024-01-16 LAB
ANION GAP SERPL CALCULATED.3IONS-SCNC: 15 MMOL/L (ref 7–16)
BASOPHILS # BLD AUTO: 0.03 K/UL (ref 0–0.2)
BASOPHILS NFR BLD AUTO: 0.5 % (ref 0–1)
BUN SERPL-MCNC: 9 MG/DL (ref 7–18)
BUN/CREAT SERPL: 12 (ref 6–20)
CALCIUM SERPL-MCNC: 9.7 MG/DL (ref 8.5–10.1)
CHLORIDE SERPL-SCNC: 103 MMOL/L (ref 98–107)
CO2 SERPL-SCNC: 26 MMOL/L (ref 21–32)
CREAT SERPL-MCNC: 0.73 MG/DL (ref 0.7–1.3)
DIFFERENTIAL METHOD BLD: ABNORMAL
EGFR (NO RACE VARIABLE) (RUSH/TITUS): 125 ML/MIN/1.73M2
EOSINOPHIL # BLD AUTO: 0.1 K/UL (ref 0–0.5)
EOSINOPHIL NFR BLD AUTO: 1.7 % (ref 1–4)
ERYTHROCYTE [DISTWIDTH] IN BLOOD BY AUTOMATED COUNT: 18.3 % (ref 11.5–14.5)
GLUCOSE SERPL-MCNC: 154 MG/DL (ref 74–106)
HCT VFR BLD AUTO: 42.4 % (ref 40–54)
HGB BLD-MCNC: 13.2 G/DL (ref 13.5–18)
LYMPHOCYTES # BLD AUTO: 1.27 K/UL (ref 1–4.8)
LYMPHOCYTES NFR BLD AUTO: 21.5 % (ref 27–41)
MCH RBC QN AUTO: 28.6 PG (ref 27–31)
MCHC RBC AUTO-ENTMCNC: 31.1 G/DL (ref 32–36)
MCV RBC AUTO: 92 FL (ref 80–96)
MONOCYTES # BLD AUTO: 0.63 K/UL (ref 0–0.8)
MONOCYTES NFR BLD AUTO: 10.7 % (ref 2–6)
MPC BLD CALC-MCNC: 10.8 FL (ref 9.4–12.4)
NEUTROPHILS # BLD AUTO: 3.88 K/UL (ref 1.8–7.7)
NEUTROPHILS NFR BLD AUTO: 65.6 % (ref 53–65)
PLATELET # BLD AUTO: 496 K/UL (ref 150–400)
POTASSIUM SERPL-SCNC: 4.3 MMOL/L (ref 3.5–5.1)
RBC # BLD AUTO: 4.61 M/UL (ref 4.6–6.2)
SODIUM SERPL-SCNC: 140 MMOL/L (ref 136–145)
WBC # BLD AUTO: 5.91 K/UL (ref 4.5–11)

## 2024-01-16 PROCEDURE — 99900035 HC TECH TIME PER 15 MIN (STAT)

## 2024-01-16 PROCEDURE — 25000003 PHARM REV CODE 250: Performed by: REGISTERED NURSE

## 2024-01-16 PROCEDURE — 27000221 HC OXYGEN, UP TO 24 HOURS

## 2024-01-16 PROCEDURE — 94761 N-INVAS EAR/PLS OXIMETRY MLT: CPT

## 2024-01-16 PROCEDURE — 25000242 PHARM REV CODE 250 ALT 637 W/ HCPCS: Performed by: INTERNAL MEDICINE

## 2024-01-16 PROCEDURE — 99900026 HC AIRWAY MAINTENANCE (STAT)

## 2024-01-16 PROCEDURE — 85025 COMPLETE CBC W/AUTO DIFF WBC: CPT | Performed by: INTERNAL MEDICINE

## 2024-01-16 PROCEDURE — 27000716 HC OXISENSOR PROBE, ANY SIZE

## 2024-01-16 PROCEDURE — 80048 BASIC METABOLIC PNL TOTAL CA: CPT | Performed by: INTERNAL MEDICINE

## 2024-01-16 PROCEDURE — 27000958

## 2024-01-16 PROCEDURE — 94640 AIRWAY INHALATION TREATMENT: CPT

## 2024-01-16 PROCEDURE — 27000935

## 2024-01-16 PROCEDURE — 63600175 PHARM REV CODE 636 W HCPCS: Performed by: INTERNAL MEDICINE

## 2024-01-16 PROCEDURE — 99900031 HC PATIENT EDUCATION (STAT)

## 2024-01-16 PROCEDURE — 11000004 HC SNF PRIVATE

## 2024-01-16 PROCEDURE — 25000003 PHARM REV CODE 250: Performed by: INTERNAL MEDICINE

## 2024-01-16 RX ADMIN — BUDESONIDE 0.5 MG: 0.5 INHALANT ORAL at 07:01

## 2024-01-16 RX ADMIN — APIXABAN 5 MG: 2.5 TABLET, FILM COATED ORAL at 08:01

## 2024-01-16 RX ADMIN — IPRATROPIUM BROMIDE AND ALBUTEROL SULFATE 3 ML: 2.5; .5 SOLUTION RESPIRATORY (INHALATION) at 07:01

## 2024-01-16 RX ADMIN — PANTOPRAZOLE SODIUM 40 MG: 40 GRANULE, DELAYED RELEASE ORAL at 08:01

## 2024-01-16 RX ADMIN — POTASSIUM IODIDE 5 DROP: 1 SOLUTION ORAL at 08:01

## 2024-01-16 RX ADMIN — PREDNISONE 10 MG: 10 TABLET ORAL at 08:01

## 2024-01-16 RX ADMIN — LEVETIRACETAM 500 MG: 500 SOLUTION ORAL at 08:01

## 2024-01-16 RX ADMIN — POTASSIUM IODIDE 5 DROP: 1 SOLUTION ORAL at 05:01

## 2024-01-16 RX ADMIN — POTASSIUM IODIDE 5 DROP: 1 SOLUTION ORAL at 01:01

## 2024-01-16 NOTE — PLAN OF CARE
Problem: Adult Inpatient Plan of Care  Goal: Plan of Care Review  Outcome: Ongoing, Progressing     Problem: Adult Inpatient Plan of Care  Goal: Patient-Specific Goal (Individualized)  Outcome: Ongoing, Progressing     Problem: Adult Inpatient Plan of Care  Goal: Optimal Comfort and Wellbeing  Outcome: Ongoing, Progressing     Problem: Device-Related Complication Risk (Mechanical Ventilation, Invasive)  Goal: Optimal Device Function  Outcome: Ongoing, Progressing     Problem: Feeding Intolerance (Enteral Nutrition)  Goal: Feeding Tolerance  Outcome: Ongoing, Progressing

## 2024-01-17 LAB — GLUCOSE SERPL-MCNC: 135 MG/DL (ref 70–105)

## 2024-01-17 PROCEDURE — 27000716 HC OXISENSOR PROBE, ANY SIZE

## 2024-01-17 PROCEDURE — 11000004 HC SNF PRIVATE

## 2024-01-17 PROCEDURE — 25000003 PHARM REV CODE 250: Performed by: INTERNAL MEDICINE

## 2024-01-17 PROCEDURE — 27000221 HC OXYGEN, UP TO 24 HOURS

## 2024-01-17 PROCEDURE — 25000003 PHARM REV CODE 250: Performed by: REGISTERED NURSE

## 2024-01-17 PROCEDURE — 94761 N-INVAS EAR/PLS OXIMETRY MLT: CPT

## 2024-01-17 PROCEDURE — 94640 AIRWAY INHALATION TREATMENT: CPT

## 2024-01-17 PROCEDURE — 99900035 HC TECH TIME PER 15 MIN (STAT)

## 2024-01-17 PROCEDURE — 82962 GLUCOSE BLOOD TEST: CPT

## 2024-01-17 PROCEDURE — 25000242 PHARM REV CODE 250 ALT 637 W/ HCPCS: Performed by: INTERNAL MEDICINE

## 2024-01-17 PROCEDURE — 27000959

## 2024-01-17 PROCEDURE — 99900031 HC PATIENT EDUCATION (STAT)

## 2024-01-17 PROCEDURE — 99900026 HC AIRWAY MAINTENANCE (STAT)

## 2024-01-17 PROCEDURE — 99310 SBSQ NF CARE HIGH MDM 45: CPT | Mod: ,,, | Performed by: INTERNAL MEDICINE

## 2024-01-17 PROCEDURE — 63600175 PHARM REV CODE 636 W HCPCS: Performed by: INTERNAL MEDICINE

## 2024-01-17 PROCEDURE — 27000958

## 2024-01-17 RX ADMIN — POTASSIUM IODIDE 5 DROP: 1 SOLUTION ORAL at 01:01

## 2024-01-17 RX ADMIN — BUDESONIDE 0.5 MG: 0.5 INHALANT ORAL at 07:01

## 2024-01-17 RX ADMIN — POLYETHYLENE GLYCOL 3350 17 G: 17 POWDER, FOR SOLUTION ORAL at 08:01

## 2024-01-17 RX ADMIN — APIXABAN 5 MG: 2.5 TABLET, FILM COATED ORAL at 08:01

## 2024-01-17 RX ADMIN — POTASSIUM IODIDE 5 DROP: 1 SOLUTION ORAL at 05:01

## 2024-01-17 RX ADMIN — PANTOPRAZOLE SODIUM 40 MG: 40 GRANULE, DELAYED RELEASE ORAL at 08:01

## 2024-01-17 RX ADMIN — LEVETIRACETAM 500 MG: 500 SOLUTION ORAL at 08:01

## 2024-01-17 RX ADMIN — POTASSIUM IODIDE 5 DROP: 1 SOLUTION ORAL at 08:01

## 2024-01-17 RX ADMIN — IPRATROPIUM BROMIDE AND ALBUTEROL SULFATE 3 ML: 2.5; .5 SOLUTION RESPIRATORY (INHALATION) at 07:01

## 2024-01-17 RX ADMIN — PREDNISONE 10 MG: 10 TABLET ORAL at 08:01

## 2024-01-17 NOTE — SUBJECTIVE & OBJECTIVE
Interval History: No acute events. Currently resting comfortably. Not tolerating capping at all. Afebrile today and vital signs are stable.      Objective:     Vital Signs (Most Recent):  Temp: 98.7 °F (37.1 °C) (01/17/24 1515)  Pulse: (!) 111 (01/17/24 1515)  Resp: (!) 28 (01/17/24 1515)  BP: 120/75 (01/17/24 1515)  SpO2: 100 % (01/17/24 1515) Vital Signs (24h Range):  Temp:  [98.3 °F (36.8 °C)-99.4 °F (37.4 °C)] 98.7 °F (37.1 °C)  Pulse:  [] 111  Resp:  [16-30] 28  SpO2:  [100 %] 100 %  BP: ()/(48-78) 120/75     Weight: 85 kg (187 lb 6.4 oz)  Body mass index is 37.85 kg/m².      Intake/Output Summary (Last 24 hours) at 1/17/2024 1659  Last data filed at 1/17/2024 1130  Gross per 24 hour   Intake 1704 ml   Output --   Net 1704 ml          Physical Exam  Vitals reviewed.   Constitutional:       General: He is not in acute distress.     Appearance: Normal appearance. He is ill-appearing.      Interventions: He is not intubated.     Comments: Tracheostomy PEG   HENT:      Head: Normocephalic and atraumatic.      Right Ear: External ear normal.      Left Ear: External ear normal.      Nose: Nose normal.      Mouth/Throat:      Mouth: Mucous membranes are dry.      Pharynx: Oropharynx is clear.      Comments: Trach in place  Eyes:      Extraocular Movements: Extraocular movements intact.      Conjunctiva/sclera: Conjunctivae normal.      Pupils: Pupils are equal, round, and reactive to light.   Neck:      Comments: trach  Cardiovascular:      Rate and Rhythm: Normal rate.      Heart sounds: Normal heart sounds. No murmur heard.  Pulmonary:      Effort: Pulmonary effort is normal. He is not intubated.      Breath sounds: Normal breath sounds. No wheezing, rhonchi or rales.   Abdominal:      General: Abdomen is flat. Bowel sounds are normal.      Palpations: Abdomen is soft.      Comments: peg   Musculoskeletal:         General: Normal range of motion.      Cervical back: Normal range of motion and neck  supple.      Right lower leg: No edema.      Left lower leg: No edema.   Skin:     General: Skin is warm and dry.      Capillary Refill: Capillary refill takes less than 2 seconds.      Coloration: Skin is not pale.   Neurological:      General: No focal deficit present.      Mental Status: He is alert and oriented to person, place, and time.      Comments: Opens eyes spontaneously but does not obey any commands   Psychiatric:         Mood and Affect: Mood normal.         Behavior: Behavior normal.           Review of Systems    Vents:  Vent Mode: CPAP (01/02/24 0500)  Ventilator Initiated: Yes (12/19/23 0450)  Set Rate: 0 BPM (12/26/23 0505)  Vt Set: 450 mL (12/26/23 0755)  Pressure Support:  (PSV max 10/ PSV min 5) (12/26/23 0505)  PEEP/CPAP: 10 cmH20 (01/02/24 0500)  Oxygen Concentration (%): 30 (01/17/24 1515)  Peak Airway Pressure: 19.8 cmH20 (01/02/24 0006)  Plateau Pressure: 341 cmH20 (11/18/23 0950)  Total Ve: 8.6 L/m (12/26/23 0755)  F/VT Ratio<105 (RSBI): (!) 60.61 (01/02/24 0500)    Lines/Drains/Airways       Drain  Duration                  Gastrostomy/Enterostomy midline -- days              Airway  Duration             Adult Surgical Airway 01/04/24 1300 Shiley Extra Large Cuffed Distal 6.0/ 75mm 13 days                    Significant Labs:    CBC/Anemia Profile:  Recent Labs   Lab 01/16/24  0634   WBC 5.91   HGB 13.2*   HCT 42.4   *   MCV 92.0   RDW 18.3*          Chemistries:  Recent Labs   Lab 01/16/24  0634      K 4.3      CO2 26   BUN 9   CREATININE 0.73   CALCIUM 9.7         All pertinent labs within the past 24 hours have been reviewed.    Significant Imaging:  I have reviewed all pertinent imaging results/findings within the past 24 hours.

## 2024-01-17 NOTE — PLAN OF CARE
Weekly Swing Bed Note    Patient's sat on trach collar with 30% FIO2.was 100%.  BBS rhonchi noted.

## 2024-01-17 NOTE — PLAN OF CARE
Problem: Aspiration (Enteral Nutrition)  Goal: Absence of Aspiration Signs and Symptoms  Outcome: Ongoing, Progressing     Problem: Feeding Intolerance (Enteral Nutrition)  Goal: Feeding Tolerance  Outcome: Ongoing, Progressing

## 2024-01-17 NOTE — PLAN OF CARE
[facility] - Medical Surgical Unit  Discharge Reassessment    Primary Care Provider: Nati Doyle FNP    Expected Discharge Date:     Reassessment (most recent)       Discharge Reassessment - 01/17/24 1139          Discharge Reassessment    Assessment Type Discharge Planning Brief Assessment     Did the patient's condition or plan change since previous assessment? No     Discharge Plan discussed with: Parent(s)     Name(s) and Number(s) Linn  pts mother     Communicated CRICKET with patient/caregiver Yes   approx two weeks    Discharge Plan A Home with family;Home Health     DME Needed Upon Discharge  hospital bed;lift device;respiratory supplies;suction machine     Transition of Care Barriers None     Why the patient remains in the hospital Requires continued medical care        Post-Acute Status    Post-Acute Authorization Home Health     Coverage medicare     Discharge Delays None known at this time                 Discussed with Linn discharge in approx two weeks and medicaid waiver and home set up and needed DME, HH ,and who family physician is and how to get pt to MD, She is to get back with me and is learning all care for Blue ,will get orders ready for discharge  home with family and home health of choice . Mother excited about going home

## 2024-01-17 NOTE — ASSESSMENT & PLAN NOTE
Opens eyes but does not do anything to command  Likely component of anoxic encephalopathy  He also had low blood sugar which could have contributed  11/22- no significant change. Questionable posturing  12/13- much more awake and alert. Does not obey any commands  1/17- new baseline. Awake and alert but does not obey any commands

## 2024-01-17 NOTE — CARE UPDATE
[facility] - Medical Surgical Unit - Swing Bed   Interdisciplinary Team Meeting    Patient: Blue Abdul   Today's Date: 1/17/2024   Estimated D/C Date:         Physician: Ramiro Flores MD Nurse Practitioner:  hernando   Pharmacy:  [facility]  Unit Director: Holly Carrillo   : Anita Krause Physical/Occupational Therapy: Jadon Mark   Speech Therapy:  Monica Anderson Activity Therapy: hernando   Nursing: Holly Carrillo  Respiratory: Tona Beckwith Dietary: Kuldeep Boggs  Other: na     Nurse  New Symptoms/Problems: na  Last Bowel Movement: 01/15/24   Urine: incontinent  Rojas: No  Bowel: incontinent   Constipated: No  Diarrhea: No   Isolation: Yes  Wound Care: No  Wound Location/Tx: na  Cognition: patient unresponsive  Aspiration Precautions: Yes  Comment(s): na    Respiratory   O2 Device: Trach Collar  O2 Flow: 30  SpO2: 100%  Neb Tx: Yes  Comment(s): na     Dietary  Nutrition:  Npo Bolus Isosource 6,11,16,20   Comment(s): teaching mother feeding and all aspects of Peg care     Speech Therapy  Speech/Swallowing:  unresponsive   Comment(s): na    Physical Therapy  Gait/Assistive Device: 0 ELOS: Plan to DC     Transfers: Activity did not occur  Bed Mobility: Maximum Assistance Range of Motion/Restrictions: na  Comment(s): hernando     Occupational Therapy  Eating/Grooming: Total Assistance Toileting: Total Assistance   Bathing: Total Assistance Dressing (Upper Body): Total Assistance   Dressing (Lower Body): Total Assistance Comment(s): na     Pharmacy  Medication Changes (see all MD orders in chart): No  Labs Reviewed: Yes  New Lab Orders: No  Comment(s): na      Tx Plan/Recommendations reviewed with family and/or patient on (date) 1/16/24.  Additional family Conference/Training: na  D/C Plan/Recommendations: Home with family  and hh CRICKET: two weeks approx  Comment(s): extensive discharge discussion with mother Linn

## 2024-01-17 NOTE — ASSESSMENT & PLAN NOTE
Currently on AVAPS 6 hours bid and doing ok  Continue to wean as tolerated  Sputum culture with heavy growth MRSA so Started linezolid bid for 7 days  11/22- afebrile. Will start some trach collar today  12/13- Doing well with trach collar. Plan to try to get to 16 hours of trach collar and will rest on avaps overnight  12/19- more purulent sputum production. Prelim culture with staph. I am going to start linezolid for 7 days. F/U culture and shape therapy accordingly  Doing well with trials. Going to increase as tolerated  1/9 doing good with trach collar but not tolerating capping. I think some of that is neurologic. I have explained to his mother that he might require the trach permanently  1/17 mother is learning trach care now. He is doing well with trach collar

## 2024-01-17 NOTE — PLAN OF CARE
Problem: Device-Related Complication Risk (Mechanical Ventilation, Invasive)  Goal: Optimal Device Function  Outcome: Ongoing, Progressing     Problem: Communication Impairment (Artificial Airway)  Goal: Effective Communication  Outcome: Ongoing, Progressing     Problem: Device-Related Complication Risk (Artificial Airway)  Goal: Optimal Device Function  Outcome: Ongoing, Progressing     Problem: Gas Exchange Impaired  Goal: Optimal Gas Exchange  Outcome: Ongoing, Progressing

## 2024-01-17 NOTE — CARE UPDATE
Pt mother completed pt trach care & suction    Resp.therapist present to observe and     Pt doing well    Will continue to monitor pt    TC @ 30%    %

## 2024-01-17 NOTE — PROGRESS NOTES
Wt: 187#Pt 's wt is down a little bit but caloric amount did not change when pt's TF was concerted to Bolus.  Fluid difference less than 50ml free water.  Pt's mother has been learning to give feedings.  Lab ok, Glu 154.  Last BM 1/15.  Pt has tolerated standard formula well.  Continue Bolus feedings.

## 2024-01-17 NOTE — PROGRESS NOTES
[facility] - Medical Surgical Unit  Pulmonology  Progress Note    Patient Name: Blue Abdul  MRN: [id number]  Admission Date: 11/9/2023  Hospital Length of Stay: 69 days  Code Status: Full Code  Attending Provider: Ramiro Flores MD  Primary Care Provider: Nati Doyle FNP   Principal Problem: Acute hypercapnic respiratory failure    Subjective:     Interval History: No acute events. Currently resting comfortably. Not tolerating capping at all. Afebrile today and vital signs are stable.      Objective:     Vital Signs (Most Recent):  Temp: 98.7 °F (37.1 °C) (01/17/24 1515)  Pulse: (!) 111 (01/17/24 1515)  Resp: (!) 28 (01/17/24 1515)  BP: 120/75 (01/17/24 1515)  SpO2: 100 % (01/17/24 1515) Vital Signs (24h Range):  Temp:  [98.3 °F (36.8 °C)-99.4 °F (37.4 °C)] 98.7 °F (37.1 °C)  Pulse:  [] 111  Resp:  [16-30] 28  SpO2:  [100 %] 100 %  BP: ()/(48-78) 120/75     Weight: 85 kg (187 lb 6.4 oz)  Body mass index is 37.85 kg/m².      Intake/Output Summary (Last 24 hours) at 1/17/2024 1659  Last data filed at 1/17/2024 1130  Gross per 24 hour   Intake 1704 ml   Output --   Net 1704 ml          Physical Exam  Vitals reviewed.   Constitutional:       General: He is not in acute distress.     Appearance: Normal appearance. He is ill-appearing.      Interventions: He is not intubated.     Comments: Tracheostomy PEG   HENT:      Head: Normocephalic and atraumatic.      Right Ear: External ear normal.      Left Ear: External ear normal.      Nose: Nose normal.      Mouth/Throat:      Mouth: Mucous membranes are dry.      Pharynx: Oropharynx is clear.      Comments: Trach in place  Eyes:      Extraocular Movements: Extraocular movements intact.      Conjunctiva/sclera: Conjunctivae normal.      Pupils: Pupils are equal, round, and reactive to light.   Neck:      Comments: trach  Cardiovascular:      Rate and Rhythm: Normal rate.      Heart sounds: Normal heart sounds. No murmur  heard.  Pulmonary:      Effort: Pulmonary effort is normal. He is not intubated.      Breath sounds: Normal breath sounds. No wheezing, rhonchi or rales.   Abdominal:      General: Abdomen is flat. Bowel sounds are normal.      Palpations: Abdomen is soft.      Comments: peg   Musculoskeletal:         General: Normal range of motion.      Cervical back: Normal range of motion and neck supple.      Right lower leg: No edema.      Left lower leg: No edema.   Skin:     General: Skin is warm and dry.      Capillary Refill: Capillary refill takes less than 2 seconds.      Coloration: Skin is not pale.   Neurological:      General: No focal deficit present.      Mental Status: He is alert and oriented to person, place, and time.      Comments: Opens eyes spontaneously but does not obey any commands   Psychiatric:         Mood and Affect: Mood normal.         Behavior: Behavior normal.           Review of Systems    Vents:  Vent Mode: CPAP (01/02/24 0500)  Ventilator Initiated: Yes (12/19/23 0450)  Set Rate: 0 BPM (12/26/23 0505)  Vt Set: 450 mL (12/26/23 0755)  Pressure Support:  (PSV max 10/ PSV min 5) (12/26/23 0505)  PEEP/CPAP: 10 cmH20 (01/02/24 0500)  Oxygen Concentration (%): 30 (01/17/24 1515)  Peak Airway Pressure: 19.8 cmH20 (01/02/24 0006)  Plateau Pressure: 341 cmH20 (11/18/23 0950)  Total Ve: 8.6 L/m (12/26/23 0755)  F/VT Ratio<105 (RSBI): (!) 60.61 (01/02/24 0500)    Lines/Drains/Airways       Drain  Duration                  Gastrostomy/Enterostomy midline -- days              Airway  Duration             Adult Surgical Airway 01/04/24 1300 Shiley Extra Large Cuffed Distal 6.0/ 75mm 13 days                    Significant Labs:    CBC/Anemia Profile:  Recent Labs   Lab 01/16/24  0634   WBC 5.91   HGB 13.2*   HCT 42.4   *   MCV 92.0   RDW 18.3*          Chemistries:  Recent Labs   Lab 01/16/24  0634      K 4.3      CO2 26   BUN 9   CREATININE 0.73   CALCIUM 9.7         All pertinent labs  within the past 24 hours have been reviewed.    Significant Imaging:  I have reviewed all pertinent imaging results/findings within the past 24 hours.  Assessment/Plan:     Neuro  Encephalopathy, metabolic  Opens eyes but does not do anything to command  Likely component of anoxic encephalopathy  He also had low blood sugar which could have contributed  11/22- no significant change. Questionable posturing  12/13- much more awake and alert. Does not obey any commands  1/17- new baseline. Awake and alert but does not obey any commands    Seizures  No recent seizure activity  Patient is on keppra 500 mg bid    Pulmonary  * Acute hypercapnic respiratory failure  Currently on AVAPS 6 hours bid and doing ok  Continue to wean as tolerated  Sputum culture with heavy growth MRSA so Started linezolid bid for 7 days  11/22- afebrile. Will start some trach collar today  12/13- Doing well with trach collar. Plan to try to get to 16 hours of trach collar and will rest on avaps overnight  12/19- more purulent sputum production. Prelim culture with staph. I am going to start linezolid for 7 days. F/U culture and shape therapy accordingly  Doing well with trials. Going to increase as tolerated  1/9 doing good with trach collar but not tolerating capping. I think some of that is neurologic. I have explained to his mother that he might require the trach permanently  1/17 mother is learning trach care now. He is doing well with trach collar    Endocrine  Diabetes mellitus  Accuchecks are ok  Currently on no medications  Can monitor blood sugar and if needed add sliding scale                 Craig Estrada, DO  Pulmonology  [facility] - Medical Surgical Unit

## 2024-01-18 LAB — GLUCOSE SERPL-MCNC: 149 MG/DL (ref 70–105)

## 2024-01-18 PROCEDURE — 99900026 HC AIRWAY MAINTENANCE (STAT)

## 2024-01-18 PROCEDURE — A6212 FOAM DRG <=16 SQ IN W/BORDER: HCPCS

## 2024-01-18 PROCEDURE — 99900035 HC TECH TIME PER 15 MIN (STAT)

## 2024-01-18 PROCEDURE — 25000003 PHARM REV CODE 250: Performed by: INTERNAL MEDICINE

## 2024-01-18 PROCEDURE — 99900031 HC PATIENT EDUCATION (STAT)

## 2024-01-18 PROCEDURE — 25000242 PHARM REV CODE 250 ALT 637 W/ HCPCS: Performed by: INTERNAL MEDICINE

## 2024-01-18 PROCEDURE — 82962 GLUCOSE BLOOD TEST: CPT

## 2024-01-18 PROCEDURE — 27000221 HC OXYGEN, UP TO 24 HOURS

## 2024-01-18 PROCEDURE — 63600175 PHARM REV CODE 636 W HCPCS: Performed by: INTERNAL MEDICINE

## 2024-01-18 PROCEDURE — 11000004 HC SNF PRIVATE

## 2024-01-18 PROCEDURE — 27200966 HC CLOSED SUCTION SYSTEM

## 2024-01-18 PROCEDURE — 94761 N-INVAS EAR/PLS OXIMETRY MLT: CPT

## 2024-01-18 PROCEDURE — 27000941

## 2024-01-18 PROCEDURE — 94640 AIRWAY INHALATION TREATMENT: CPT

## 2024-01-18 PROCEDURE — 25000003 PHARM REV CODE 250: Performed by: REGISTERED NURSE

## 2024-01-18 PROCEDURE — 27000935

## 2024-01-18 RX ADMIN — LEVETIRACETAM 500 MG: 500 SOLUTION ORAL at 08:01

## 2024-01-18 RX ADMIN — APIXABAN 5 MG: 2.5 TABLET, FILM COATED ORAL at 08:01

## 2024-01-18 RX ADMIN — POTASSIUM IODIDE 5 DROP: 1 SOLUTION ORAL at 05:01

## 2024-01-18 RX ADMIN — PANTOPRAZOLE SODIUM 40 MG: 40 GRANULE, DELAYED RELEASE ORAL at 09:01

## 2024-01-18 RX ADMIN — IPRATROPIUM BROMIDE AND ALBUTEROL SULFATE 3 ML: 2.5; .5 SOLUTION RESPIRATORY (INHALATION) at 07:01

## 2024-01-18 RX ADMIN — POTASSIUM IODIDE 5 DROP: 1 SOLUTION ORAL at 09:01

## 2024-01-18 RX ADMIN — PREDNISONE 10 MG: 10 TABLET ORAL at 09:01

## 2024-01-18 RX ADMIN — POTASSIUM IODIDE 5 DROP: 1 SOLUTION ORAL at 08:01

## 2024-01-18 RX ADMIN — LEVETIRACETAM 500 MG: 500 SOLUTION ORAL at 09:01

## 2024-01-18 RX ADMIN — APIXABAN 5 MG: 2.5 TABLET, FILM COATED ORAL at 09:01

## 2024-01-18 RX ADMIN — PANTOPRAZOLE SODIUM 40 MG: 40 GRANULE, DELAYED RELEASE ORAL at 08:01

## 2024-01-18 RX ADMIN — BUDESONIDE 0.5 MG: 0.5 INHALANT ORAL at 07:01

## 2024-01-18 RX ADMIN — POTASSIUM IODIDE 5 DROP: 1 SOLUTION ORAL at 12:01

## 2024-01-18 NOTE — PLAN OF CARE
Problem: Adult Inpatient Plan of Care  Goal: Plan of Care Review  Outcome: Ongoing, Progressing  Goal: Patient-Specific Goal (Individualized)  Outcome: Ongoing, Progressing  Goal: Absence of Hospital-Acquired Illness or Injury  Outcome: Ongoing, Progressing     Problem: Device-Related Complication Risk (Mechanical Ventilation, Invasive)  Goal: Optimal Device Function  Outcome: Ongoing, Progressing     Problem: Nutrition Impairment (Mechanical Ventilation, Invasive)  Goal: Optimal Nutrition Delivery  Outcome: Ongoing, Progressing     Problem: Communication Impairment (Artificial Airway)  Goal: Effective Communication  Outcome: Ongoing, Progressing     Problem: Device-Related Complication Risk (Artificial Airway)  Goal: Optimal Device Function  Outcome: Ongoing, Progressing     Problem: Skin and Tissue Injury (Artificial Airway)  Goal: Absence of Device-Related Skin or Tissue Injury  Outcome: Ongoing, Progressing     Problem: Fall Injury Risk  Goal: Absence of Fall and Fall-Related Injury  Outcome: Ongoing, Progressing     Problem: Skin Injury Risk Increased  Goal: Skin Health and Integrity  Outcome: Ongoing, Progressing     Problem: Impaired Wound Healing  Goal: Optimal Wound Healing  Outcome: Ongoing, Progressing     Problem: Gas Exchange Impaired  Goal: Optimal Gas Exchange  Outcome: Ongoing, Progressing     Problem: Breathing Pattern Ineffective  Goal: Effective Breathing Pattern  Outcome: Ongoing, Progressing     Problem: Airway Clearance Ineffective  Goal: Effective Airway Clearance  Outcome: Ongoing, Progressing

## 2024-01-18 NOTE — PROGRESS NOTES
Observed patient's mother during trach care cleaning and suctioning.  Patient's mother did fine with both procedure keeping the field very sterile.  No complications with either procedure at this time.

## 2024-01-19 LAB — GLUCOSE SERPL-MCNC: 191 MG/DL (ref 70–105)

## 2024-01-19 PROCEDURE — 25000242 PHARM REV CODE 250 ALT 637 W/ HCPCS: Performed by: INTERNAL MEDICINE

## 2024-01-19 PROCEDURE — 82962 GLUCOSE BLOOD TEST: CPT

## 2024-01-19 PROCEDURE — 99900026 HC AIRWAY MAINTENANCE (STAT)

## 2024-01-19 PROCEDURE — 27000941

## 2024-01-19 PROCEDURE — 27000221 HC OXYGEN, UP TO 24 HOURS

## 2024-01-19 PROCEDURE — 25000003 PHARM REV CODE 250: Performed by: REGISTERED NURSE

## 2024-01-19 PROCEDURE — 11000004 HC SNF PRIVATE

## 2024-01-19 PROCEDURE — 99900031 HC PATIENT EDUCATION (STAT)

## 2024-01-19 PROCEDURE — 99900035 HC TECH TIME PER 15 MIN (STAT)

## 2024-01-19 PROCEDURE — 27000935

## 2024-01-19 PROCEDURE — 27000958

## 2024-01-19 PROCEDURE — 94640 AIRWAY INHALATION TREATMENT: CPT

## 2024-01-19 PROCEDURE — 27200966 HC CLOSED SUCTION SYSTEM

## 2024-01-19 PROCEDURE — 94761 N-INVAS EAR/PLS OXIMETRY MLT: CPT

## 2024-01-19 PROCEDURE — 25000003 PHARM REV CODE 250: Performed by: INTERNAL MEDICINE

## 2024-01-19 PROCEDURE — 63600175 PHARM REV CODE 636 W HCPCS: Performed by: INTERNAL MEDICINE

## 2024-01-19 RX ADMIN — PREDNISONE 10 MG: 10 TABLET ORAL at 08:01

## 2024-01-19 RX ADMIN — PANTOPRAZOLE SODIUM 40 MG: 40 GRANULE, DELAYED RELEASE ORAL at 08:01

## 2024-01-19 RX ADMIN — IPRATROPIUM BROMIDE AND ALBUTEROL SULFATE 3 ML: 2.5; .5 SOLUTION RESPIRATORY (INHALATION) at 07:01

## 2024-01-19 RX ADMIN — POTASSIUM IODIDE 5 DROP: 1 SOLUTION ORAL at 08:01

## 2024-01-19 RX ADMIN — PANTOPRAZOLE SODIUM 40 MG: 40 GRANULE, DELAYED RELEASE ORAL at 09:01

## 2024-01-19 RX ADMIN — BUDESONIDE 0.5 MG: 0.5 INHALANT ORAL at 07:01

## 2024-01-19 RX ADMIN — LEVETIRACETAM 500 MG: 500 SOLUTION ORAL at 08:01

## 2024-01-19 RX ADMIN — POTASSIUM IODIDE 5 DROP: 1 SOLUTION ORAL at 05:01

## 2024-01-19 RX ADMIN — LEVETIRACETAM 500 MG: 500 SOLUTION ORAL at 09:01

## 2024-01-19 RX ADMIN — POTASSIUM IODIDE 5 DROP: 1 SOLUTION ORAL at 01:01

## 2024-01-19 RX ADMIN — POTASSIUM IODIDE 5 DROP: 1 SOLUTION ORAL at 09:01

## 2024-01-19 RX ADMIN — APIXABAN 5 MG: 2.5 TABLET, FILM COATED ORAL at 09:01

## 2024-01-19 RX ADMIN — APIXABAN 5 MG: 2.5 TABLET, FILM COATED ORAL at 08:01

## 2024-01-19 NOTE — PT/OT/SLP PROGRESS
OT talked to patient's mother regarding her request for a wheelchair to use at home for transporting patient to MD appts. OT encouraged pt's mother to use an ambulance for any transportation needs for patient, due to safety concerns related to his current medical condition. Patient's mother was in agreement after discussion and stated understanding of potential complications that could arise if she attempts to transport patient anywhere via wheelchair and personal vehicle.

## 2024-01-19 NOTE — PLAN OF CARE
Pts caregiver has been to Medicaid to start Medicaid waiver application and I have sent referral for upcoming discharge, [facility] ordered and faxed all clinical to [facility] and spoke with Kristen today she did receive fax , Mother wants me to try [employer] for other DME, since pt already gets albuterol from them .

## 2024-01-19 NOTE — PLAN OF CARE
PATIENTS MOTHER WAS ASLEEP AT HIS TIME HE GOT TRACH CARE AND SUCTION. PATIENTS O2 SAT ON 30% TRACH COLLAR IS 95-99%. PATIENT SEEMS TO BE RESTING COMFORTABLY.

## 2024-01-19 NOTE — PLAN OF CARE
Care plan reviewed  Problem: Adult Inpatient Plan of Care  Goal: Plan of Care Review  Outcome: Ongoing, Progressing     Problem: Adult Inpatient Plan of Care  Goal: Patient-Specific Goal (Individualized)  Outcome: Ongoing, Progressing     Problem: Adult Inpatient Plan of Care  Goal: Absence of Hospital-Acquired Illness or Injury  Outcome: Ongoing, Progressing     Problem: Adult Inpatient Plan of Care  Goal: Optimal Comfort and Wellbeing  Outcome: Ongoing, Progressing     Problem: Nutrition Impairment (Mechanical Ventilation, Invasive)  Goal: Optimal Nutrition Delivery  Outcome: Ongoing, Progressing

## 2024-01-20 LAB — GLUCOSE SERPL-MCNC: 167 MG/DL (ref 70–105)

## 2024-01-20 PROCEDURE — 63600175 PHARM REV CODE 636 W HCPCS: Performed by: INTERNAL MEDICINE

## 2024-01-20 PROCEDURE — 25000003 PHARM REV CODE 250: Performed by: INTERNAL MEDICINE

## 2024-01-20 PROCEDURE — 82962 GLUCOSE BLOOD TEST: CPT

## 2024-01-20 PROCEDURE — 99900026 HC AIRWAY MAINTENANCE (STAT)

## 2024-01-20 PROCEDURE — 11000004 HC SNF PRIVATE

## 2024-01-20 PROCEDURE — 99900031 HC PATIENT EDUCATION (STAT)

## 2024-01-20 PROCEDURE — 27000221 HC OXYGEN, UP TO 24 HOURS

## 2024-01-20 PROCEDURE — 94761 N-INVAS EAR/PLS OXIMETRY MLT: CPT

## 2024-01-20 PROCEDURE — 25000003 PHARM REV CODE 250: Performed by: REGISTERED NURSE

## 2024-01-20 PROCEDURE — 94640 AIRWAY INHALATION TREATMENT: CPT

## 2024-01-20 PROCEDURE — 99900035 HC TECH TIME PER 15 MIN (STAT)

## 2024-01-20 PROCEDURE — 25000242 PHARM REV CODE 250 ALT 637 W/ HCPCS: Performed by: INTERNAL MEDICINE

## 2024-01-20 RX ADMIN — LEVETIRACETAM 500 MG: 500 SOLUTION ORAL at 08:01

## 2024-01-20 RX ADMIN — POTASSIUM IODIDE 5 DROP: 1 SOLUTION ORAL at 05:01

## 2024-01-20 RX ADMIN — IPRATROPIUM BROMIDE AND ALBUTEROL SULFATE 3 ML: 2.5; .5 SOLUTION RESPIRATORY (INHALATION) at 07:01

## 2024-01-20 RX ADMIN — APIXABAN 5 MG: 2.5 TABLET, FILM COATED ORAL at 08:01

## 2024-01-20 RX ADMIN — PANTOPRAZOLE SODIUM 40 MG: 40 GRANULE, DELAYED RELEASE ORAL at 08:01

## 2024-01-20 RX ADMIN — POTASSIUM IODIDE 5 DROP: 1 SOLUTION ORAL at 08:01

## 2024-01-20 RX ADMIN — POTASSIUM IODIDE 5 DROP: 1 SOLUTION ORAL at 01:01

## 2024-01-20 RX ADMIN — PREDNISONE 10 MG: 10 TABLET ORAL at 08:01

## 2024-01-20 RX ADMIN — BUDESONIDE 0.5 MG: 0.5 INHALANT ORAL at 07:01

## 2024-01-20 NOTE — RESPIRATORY THERAPY
1315     placed pt on room air for home 02    Nurse notified     Will continue to monitor pt    02-92%        1330    Pt 02 was 85-86% on room air    Placed pt back on TC @ 28%    Pt 02 came back up to 92%

## 2024-01-20 NOTE — CARE UPDATE
Pt mother completed trach care & suction on pt    Resp.therapist present to observe &     Turned pt 02 down from 30% to 28% for weaning    Nurse notified     Will continue to monitor pt

## 2024-01-20 NOTE — PLAN OF CARE
Care plan reviewed  Problem: Adult Inpatient Plan of Care  Goal: Plan of Care Review  Outcome: Ongoing, Progressing     Problem: Adult Inpatient Plan of Care  Goal: Absence of Hospital-Acquired Illness or Injury  Outcome: Ongoing, Progressing     Problem: Adult Inpatient Plan of Care  Goal: Patient-Specific Goal (Individualized)  Outcome: Ongoing, Progressing     Problem: Adult Inpatient Plan of Care  Goal: Absence of Hospital-Acquired Illness or Injury  Outcome: Ongoing, Progressing

## 2024-01-20 NOTE — PLAN OF CARE
PATIENT O2 SAT ON 30% TRACH COLLAR 98%. PATIENT IS RESTING COMFORTABLY RESPIRATIONS ARE SLIGHTLY ELEVATED AT 26.

## 2024-01-20 NOTE — CARE UPDATE
0913    Took pt off 02 (room air) for 30 minutes to try to qualify for home 02    Nurse notified    Resp. Therapist will be in the room the entire time with pt    02-96% on 28%        0943    Pt was placed back on 02 @ 28%    He went 30 minutes without 02    He started to get agitated & a little SOB with increased RR    02 was 89%    Nurse notified

## 2024-01-21 LAB — GLUCOSE SERPL-MCNC: 163 MG/DL (ref 70–105)

## 2024-01-21 PROCEDURE — 27000716 HC OXISENSOR PROBE, ANY SIZE

## 2024-01-21 PROCEDURE — 99900035 HC TECH TIME PER 15 MIN (STAT)

## 2024-01-21 PROCEDURE — 25000003 PHARM REV CODE 250: Performed by: REGISTERED NURSE

## 2024-01-21 PROCEDURE — 25000003 PHARM REV CODE 250: Performed by: INTERNAL MEDICINE

## 2024-01-21 PROCEDURE — 94640 AIRWAY INHALATION TREATMENT: CPT

## 2024-01-21 PROCEDURE — 25000242 PHARM REV CODE 250 ALT 637 W/ HCPCS: Performed by: INTERNAL MEDICINE

## 2024-01-21 PROCEDURE — 99900026 HC AIRWAY MAINTENANCE (STAT)

## 2024-01-21 PROCEDURE — 27000958

## 2024-01-21 PROCEDURE — 63600175 PHARM REV CODE 636 W HCPCS: Performed by: INTERNAL MEDICINE

## 2024-01-21 PROCEDURE — 94761 N-INVAS EAR/PLS OXIMETRY MLT: CPT

## 2024-01-21 PROCEDURE — 27000935

## 2024-01-21 PROCEDURE — 99900031 HC PATIENT EDUCATION (STAT)

## 2024-01-21 PROCEDURE — 82962 GLUCOSE BLOOD TEST: CPT

## 2024-01-21 PROCEDURE — 27000221 HC OXYGEN, UP TO 24 HOURS

## 2024-01-21 PROCEDURE — 11000004 HC SNF PRIVATE

## 2024-01-21 RX ADMIN — PANTOPRAZOLE SODIUM 40 MG: 40 GRANULE, DELAYED RELEASE ORAL at 08:01

## 2024-01-21 RX ADMIN — POTASSIUM IODIDE 5 DROP: 1 SOLUTION ORAL at 08:01

## 2024-01-21 RX ADMIN — LEVETIRACETAM 500 MG: 500 SOLUTION ORAL at 08:01

## 2024-01-21 RX ADMIN — BUDESONIDE 0.5 MG: 0.5 INHALANT ORAL at 07:01

## 2024-01-21 RX ADMIN — IPRATROPIUM BROMIDE AND ALBUTEROL SULFATE 3 ML: 2.5; .5 SOLUTION RESPIRATORY (INHALATION) at 07:01

## 2024-01-21 RX ADMIN — POTASSIUM IODIDE 5 DROP: 1 SOLUTION ORAL at 05:01

## 2024-01-21 RX ADMIN — APIXABAN 5 MG: 2.5 TABLET, FILM COATED ORAL at 08:01

## 2024-01-21 RX ADMIN — PREDNISONE 10 MG: 10 TABLET ORAL at 08:01

## 2024-01-21 RX ADMIN — POTASSIUM IODIDE 5 DROP: 1 SOLUTION ORAL at 01:01

## 2024-01-21 NOTE — CARE UPDATE
DID A ROOM AIR TRIAL ON PATIENT. TRACH COLLAR OFF AT 1955. PATIENT O2 SAT WENT TO 86% WITHIN 3 MINUTES. TRACH COLLAR PLACED BACK ON PATIENT AT 28%.

## 2024-01-21 NOTE — RESPIRATORY THERAPY
PEDIATRIC O2 SAT PROBE WAS OBTAINED AND PLACED ON PATIENT. FOREHEAD O2 SAT PROBE SHOWED 100% AND PEDIATRIC FINGER PROBE SHOWED 93% WHICH CORRELATES WITH PORTABLE O2 SAT.

## 2024-01-21 NOTE — PLAN OF CARE
Problem: Adult Inpatient Plan of Care  Goal: Plan of Care Review  Outcome: Ongoing, Progressing     Problem: Adult Inpatient Plan of Care  Goal: Absence of Hospital-Acquired Illness or Injury  Outcome: Ongoing, Progressing     Problem: Adult Inpatient Plan of Care  Goal: Optimal Comfort and Wellbeing  Outcome: Ongoing, Progressing     Problem: Nutrition Impairment (Mechanical Ventilation, Invasive)  Goal: Optimal Nutrition Delivery  Outcome: Ongoing, Progressing     Problem: Bariatric Environmental Safety  Goal: Safety Maintained with Care  Outcome: Ongoing, Progressing     Problem: Feeding Intolerance (Enteral Nutrition)  Goal: Feeding Tolerance  Outcome: Ongoing, Progressing

## 2024-01-21 NOTE — PLAN OF CARE
PATIENT O2 SAT ON TRACH COLLAR 28% 94%. IT WAS NOTICED TODAY THAT O2 SAT ON MONITOR DOES NOT MATCH WHAT IS BEING DISPLAYED ON PORTABLE PULSE OX. TONIGHT MONITOR DISPLAYED 100%. PORTABLE PULSE OX READ 94%

## 2024-01-21 NOTE — CARE UPDATE
0933    Took pt off 02 (room air) X 30 minutes for home 02    Nurse notified    Will continue to monitor pt    02-92%    HR-106    RR-27

## 2024-01-21 NOTE — CARE UPDATE
Pt mother completed trach care & suction    Resp.therapist present the entire time    Pt deb well    Will continue to monitor pt

## 2024-01-21 NOTE — CARE UPDATE
1003    Placed pt back on TC @ 28% after he was on room air X 30 minutes    02-89%    02 came back up to 93%

## 2024-01-22 LAB — GLUCOSE SERPL-MCNC: 153 MG/DL (ref 70–105)

## 2024-01-22 PROCEDURE — 25000003 PHARM REV CODE 250: Performed by: INTERNAL MEDICINE

## 2024-01-22 PROCEDURE — 27000221 HC OXYGEN, UP TO 24 HOURS

## 2024-01-22 PROCEDURE — 27000958

## 2024-01-22 PROCEDURE — 25000003 PHARM REV CODE 250: Performed by: REGISTERED NURSE

## 2024-01-22 PROCEDURE — 63600175 PHARM REV CODE 636 W HCPCS: Performed by: INTERNAL MEDICINE

## 2024-01-22 PROCEDURE — 27200966 HC CLOSED SUCTION SYSTEM

## 2024-01-22 PROCEDURE — 99900026 HC AIRWAY MAINTENANCE (STAT)

## 2024-01-22 PROCEDURE — 82962 GLUCOSE BLOOD TEST: CPT

## 2024-01-22 PROCEDURE — 11000004 HC SNF PRIVATE

## 2024-01-22 PROCEDURE — 25000242 PHARM REV CODE 250 ALT 637 W/ HCPCS: Performed by: INTERNAL MEDICINE

## 2024-01-22 PROCEDURE — 27000935

## 2024-01-22 PROCEDURE — 94640 AIRWAY INHALATION TREATMENT: CPT

## 2024-01-22 PROCEDURE — 94761 N-INVAS EAR/PLS OXIMETRY MLT: CPT

## 2024-01-22 RX ADMIN — IPRATROPIUM BROMIDE AND ALBUTEROL SULFATE 3 ML: 2.5; .5 SOLUTION RESPIRATORY (INHALATION) at 07:01

## 2024-01-22 RX ADMIN — POTASSIUM IODIDE 5 DROP: 1 SOLUTION ORAL at 09:01

## 2024-01-22 RX ADMIN — BUDESONIDE 0.5 MG: 0.5 INHALANT ORAL at 07:01

## 2024-01-22 RX ADMIN — POTASSIUM IODIDE 5 DROP: 1 SOLUTION ORAL at 08:01

## 2024-01-22 RX ADMIN — POTASSIUM IODIDE 5 DROP: 1 SOLUTION ORAL at 05:01

## 2024-01-22 RX ADMIN — POTASSIUM IODIDE 5 DROP: 1 SOLUTION ORAL at 01:01

## 2024-01-22 RX ADMIN — PANTOPRAZOLE SODIUM 40 MG: 40 GRANULE, DELAYED RELEASE ORAL at 08:01

## 2024-01-22 RX ADMIN — LEVETIRACETAM 500 MG: 500 SOLUTION ORAL at 09:01

## 2024-01-22 RX ADMIN — APIXABAN 5 MG: 2.5 TABLET, FILM COATED ORAL at 09:01

## 2024-01-22 RX ADMIN — PREDNISONE 10 MG: 10 TABLET ORAL at 09:01

## 2024-01-22 RX ADMIN — APIXABAN 5 MG: 2.5 TABLET, FILM COATED ORAL at 08:01

## 2024-01-22 RX ADMIN — LEVETIRACETAM 500 MG: 500 SOLUTION ORAL at 08:01

## 2024-01-22 RX ADMIN — PANTOPRAZOLE SODIUM 40 MG: 40 GRANULE, DELAYED RELEASE ORAL at 09:01

## 2024-01-22 NOTE — PLAN OF CARE
Weekly Swing Bed note    Patient's sat this morning was 92% on Trach collar @ 28% Fio2.  BBS coarse.  Patient's Mother is suctioning and doing trach care with Respiratory personnel present.  Mother is doing well with teaching.

## 2024-01-22 NOTE — PLAN OF CARE
Problem: Adult Inpatient Plan of Care  Goal: Plan of Care Review  Outcome: Ongoing, Progressing     Problem: Adult Inpatient Plan of Care  Goal: Absence of Hospital-Acquired Illness or Injury  Outcome: Ongoing, Progressing     Problem: Adult Inpatient Plan of Care  Goal: Optimal Comfort and Wellbeing  Outcome: Ongoing, Progressing     Problem: Nutrition Impairment (Mechanical Ventilation, Invasive)  Goal: Optimal Nutrition Delivery  Outcome: Ongoing, Progressing     Problem: Feeding Intolerance (Enteral Nutrition)  Goal: Feeding Tolerance  Outcome: Ongoing, Progressing     Problem: Aspiration (Enteral Nutrition)  Goal: Absence of Aspiration Signs and Symptoms  Outcome: Ongoing, Progressing

## 2024-01-23 LAB
ANION GAP SERPL CALCULATED.3IONS-SCNC: 16 MMOL/L (ref 7–16)
ANISOCYTOSIS BLD QL SMEAR: ABNORMAL
BASOPHILS # BLD AUTO: 0.03 K/UL (ref 0–0.2)
BASOPHILS NFR BLD AUTO: 0.5 % (ref 0–1)
BUN SERPL-MCNC: 9 MG/DL (ref 7–18)
BUN/CREAT SERPL: 14 (ref 6–20)
CALCIUM SERPL-MCNC: 9.4 MG/DL (ref 8.5–10.1)
CHLORIDE SERPL-SCNC: 101 MMOL/L (ref 98–107)
CO2 SERPL-SCNC: 24 MMOL/L (ref 21–32)
CREAT SERPL-MCNC: 0.63 MG/DL (ref 0.7–1.3)
DIFFERENTIAL METHOD BLD: ABNORMAL
EGFR (NO RACE VARIABLE) (RUSH/TITUS): 130 ML/MIN/1.73M2
EOSINOPHIL # BLD AUTO: 0.11 K/UL (ref 0–0.5)
EOSINOPHIL NFR BLD AUTO: 1.7 % (ref 1–4)
EOSINOPHIL NFR BLD MANUAL: 2 % (ref 1–4)
ERYTHROCYTE [DISTWIDTH] IN BLOOD BY AUTOMATED COUNT: 18.5 % (ref 11.5–14.5)
GLUCOSE SERPL-MCNC: 145 MG/DL (ref 74–106)
HCT VFR BLD AUTO: 38.2 % (ref 40–54)
HGB BLD-MCNC: 11.8 G/DL (ref 13.5–18)
LYMPHOCYTES # BLD AUTO: 1.78 K/UL (ref 1–4.8)
LYMPHOCYTES NFR BLD AUTO: 27.5 % (ref 27–41)
LYMPHOCYTES NFR BLD MANUAL: 32 % (ref 27–41)
MCH RBC QN AUTO: 28.4 PG (ref 27–31)
MCHC RBC AUTO-ENTMCNC: 30.9 G/DL (ref 32–36)
MCV RBC AUTO: 91.8 FL (ref 80–96)
MONOCYTES # BLD AUTO: 0.61 K/UL (ref 0–0.8)
MONOCYTES NFR BLD AUTO: 9.4 % (ref 2–6)
MONOCYTES NFR BLD MANUAL: 6 % (ref 2–6)
MPC BLD CALC-MCNC: 10.7 FL (ref 9.4–12.4)
NEUTROPHILS # BLD AUTO: 3.95 K/UL (ref 1.8–7.7)
NEUTROPHILS NFR BLD AUTO: 60.9 % (ref 53–65)
NEUTS BAND NFR BLD MANUAL: 7 % (ref 1–5)
NEUTS SEG NFR BLD MANUAL: 53 % (ref 50–62)
NRBC BLD MANUAL-RTO: ABNORMAL %
PLATELET # BLD AUTO: 420 K/UL (ref 150–400)
POIKILOCYTOSIS BLD QL SMEAR: ABNORMAL
POTASSIUM SERPL-SCNC: 3.8 MMOL/L (ref 3.5–5.1)
RBC # BLD AUTO: 4.16 M/UL (ref 4.6–6.2)
SODIUM SERPL-SCNC: 137 MMOL/L (ref 136–145)
TARGETS BLD QL SMEAR: ABNORMAL
WBC # BLD AUTO: 6.48 K/UL (ref 4.5–11)

## 2024-01-23 PROCEDURE — 99900035 HC TECH TIME PER 15 MIN (STAT)

## 2024-01-23 PROCEDURE — 25000003 PHARM REV CODE 250: Performed by: INTERNAL MEDICINE

## 2024-01-23 PROCEDURE — 27000935

## 2024-01-23 PROCEDURE — 25000003 PHARM REV CODE 250: Performed by: REGISTERED NURSE

## 2024-01-23 PROCEDURE — 99900026 HC AIRWAY MAINTENANCE (STAT)

## 2024-01-23 PROCEDURE — 85025 COMPLETE CBC W/AUTO DIFF WBC: CPT | Performed by: INTERNAL MEDICINE

## 2024-01-23 PROCEDURE — 94640 AIRWAY INHALATION TREATMENT: CPT

## 2024-01-23 PROCEDURE — 27000941

## 2024-01-23 PROCEDURE — 11000004 HC SNF PRIVATE

## 2024-01-23 PROCEDURE — 25000242 PHARM REV CODE 250 ALT 637 W/ HCPCS: Performed by: INTERNAL MEDICINE

## 2024-01-23 PROCEDURE — 94761 N-INVAS EAR/PLS OXIMETRY MLT: CPT

## 2024-01-23 PROCEDURE — 99308 SBSQ NF CARE LOW MDM 20: CPT | Mod: ,,, | Performed by: INTERNAL MEDICINE

## 2024-01-23 PROCEDURE — 63600175 PHARM REV CODE 636 W HCPCS: Performed by: INTERNAL MEDICINE

## 2024-01-23 PROCEDURE — 80048 BASIC METABOLIC PNL TOTAL CA: CPT | Performed by: INTERNAL MEDICINE

## 2024-01-23 RX ADMIN — BUDESONIDE 0.5 MG: 0.5 INHALANT ORAL at 07:01

## 2024-01-23 RX ADMIN — PANTOPRAZOLE SODIUM 40 MG: 40 GRANULE, DELAYED RELEASE ORAL at 09:01

## 2024-01-23 RX ADMIN — PREDNISONE 10 MG: 10 TABLET ORAL at 09:01

## 2024-01-23 RX ADMIN — PANTOPRAZOLE SODIUM 40 MG: 40 GRANULE, DELAYED RELEASE ORAL at 08:01

## 2024-01-23 RX ADMIN — POTASSIUM IODIDE 5 DROP: 1 SOLUTION ORAL at 01:01

## 2024-01-23 RX ADMIN — POTASSIUM IODIDE 5 DROP: 1 SOLUTION ORAL at 05:01

## 2024-01-23 RX ADMIN — POLYETHYLENE GLYCOL 3350 17 G: 17 POWDER, FOR SOLUTION ORAL at 08:01

## 2024-01-23 RX ADMIN — APIXABAN 5 MG: 2.5 TABLET, FILM COATED ORAL at 08:01

## 2024-01-23 RX ADMIN — POTASSIUM IODIDE 5 DROP: 1 SOLUTION ORAL at 09:01

## 2024-01-23 RX ADMIN — IPRATROPIUM BROMIDE AND ALBUTEROL SULFATE 3 ML: 2.5; .5 SOLUTION RESPIRATORY (INHALATION) at 07:01

## 2024-01-23 RX ADMIN — LEVETIRACETAM 500 MG: 500 SOLUTION ORAL at 09:01

## 2024-01-23 RX ADMIN — POTASSIUM IODIDE 5 DROP: 1 SOLUTION ORAL at 08:01

## 2024-01-23 RX ADMIN — LEVETIRACETAM 500 MG: 500 SOLUTION ORAL at 08:01

## 2024-01-23 RX ADMIN — APIXABAN 5 MG: 2.5 TABLET, FILM COATED ORAL at 09:01

## 2024-01-23 RX ADMIN — BUDESONIDE 0.5 MG: 0.5 INHALANT ORAL at 08:01

## 2024-01-23 NOTE — PROGRESS NOTES
Wt: 192#  No problems noted with TF.  Lab WNL RDN visited pt this a.m. and educated pt on Home TF. Educational booklet and name with number given for questions.  This is pt's sole source of nutrition indefinitely.  TF order: one carton Isosource 1.5, 4 times per day with 125ml water before and after feedings. This TF provides: 1500 kcal, 68g PRO, 1764ml free water, 2000ml total fluids.  EEN: 9229-5702 kcal, 55-66g PRO, 1750-1980ml fluids per day.  Continue NS.

## 2024-01-23 NOTE — PLAN OF CARE
Problem: Adult Inpatient Plan of Care  Goal: Plan of Care Review  Outcome: Ongoing, Progressing  Goal: Patient-Specific Goal (Individualized)  Outcome: Ongoing, Progressing  Goal: Absence of Hospital-Acquired Illness or Injury  Outcome: Ongoing, Progressing  Goal: Optimal Comfort and Wellbeing  Outcome: Ongoing, Progressing  Goal: Readiness for Transition of Care  Outcome: Ongoing, Progressing     Problem: Nutrition Impairment (Mechanical Ventilation, Invasive)  Goal: Optimal Nutrition Delivery  Outcome: Ongoing, Progressing     Problem: Communication Impairment (Artificial Airway)  Goal: Effective Communication  Outcome: Ongoing, Not Progressing     Problem: Device-Related Complication Risk (Artificial Airway)  Goal: Optimal Device Function  Outcome: Ongoing, Progressing     Problem: Skin and Tissue Injury (Artificial Airway)  Goal: Absence of Device-Related Skin or Tissue Injury  Outcome: Ongoing, Progressing     Problem: Fall Injury Risk  Goal: Absence of Fall and Fall-Related Injury  Outcome: Ongoing, Progressing     Problem: Skin Injury Risk Increased  Goal: Skin Health and Integrity  Outcome: Ongoing, Progressing     Problem: Impaired Wound Healing  Goal: Optimal Wound Healing  Outcome: Ongoing, Progressing     Problem: Diabetes Comorbidity  Goal: Blood Glucose Level Within Targeted Range  Outcome: Ongoing, Progressing     Problem: Bariatric Environmental Safety  Goal: Safety Maintained with Care  Outcome: Ongoing, Progressing     Problem: Gas Exchange Impaired  Goal: Optimal Gas Exchange  Outcome: Ongoing, Progressing     Problem: Breathing Pattern Ineffective  Goal: Effective Breathing Pattern  Outcome: Ongoing, Progressing     Problem: Airway Clearance Ineffective  Goal: Effective Airway Clearance  Outcome: Ongoing, Progressing     Problem: Aspiration (Enteral Nutrition)  Goal: Absence of Aspiration Signs and Symptoms  Outcome: Ongoing, Progressing     Problem: Feeding Intolerance (Enteral  Nutrition)  Goal: Feeding Tolerance  Outcome: Ongoing, Progressing

## 2024-01-23 NOTE — ASSESSMENT & PLAN NOTE
Patient not tolerating his trach being plugged or Passy Olya valve he will need go home with tracheostomy.  Mother wean taught how to take of it

## 2024-01-23 NOTE — SUBJECTIVE & OBJECTIVE
Interval History:  Patient without complaints    Review of Systems  Objective:     Vital Signs (Most Recent):  Temp: 99 °F (37.2 °C) (01/23/24 0737)  Pulse: 96 (01/23/24 0755)  Resp: (!) 23 (01/23/24 0755)  BP: 116/65 (01/23/24 0737)  SpO2: 97 % (01/23/24 0755) Vital Signs (24h Range):  Temp:  [98.6 °F (37 °C)-99.4 °F (37.4 °C)] 99 °F (37.2 °C)  Pulse:  [] 96  Resp:  [19-29] 23  SpO2:  [90 %-98 %] 97 %  BP: ()/(52-71) 116/65     Weight: 87.5 kg (192 lb 14.4 oz)  Body mass index is 38.96 kg/m².    Intake/Output Summary (Last 24 hours) at 1/23/2024 1100  Last data filed at 1/23/2024 0931  Gross per 24 hour   Intake 2220 ml   Output --   Net 2220 ml         Physical Exam  Vitals reviewed.   Constitutional:       Appearance: Normal appearance.      Interventions: He is not intubated.  HENT:      Head: Normocephalic and atraumatic.      Nose: Nose normal.      Mouth/Throat:      Mouth: Mucous membranes are dry.      Pharynx: Oropharynx is clear.   Eyes:      Extraocular Movements: Extraocular movements intact.      Conjunctiva/sclera: Conjunctivae normal.      Pupils: Pupils are equal, round, and reactive to light.   Cardiovascular:      Rate and Rhythm: Normal rate.      Heart sounds: Normal heart sounds. No murmur heard.  Pulmonary:      Effort: Pulmonary effort is normal. He is not intubated.      Breath sounds: Normal breath sounds.   Abdominal:      General: Abdomen is flat. Bowel sounds are normal.      Palpations: Abdomen is soft.   Musculoskeletal:         General: Normal range of motion.      Cervical back: Normal range of motion and neck supple.      Right lower leg: No edema.      Left lower leg: No edema.   Skin:     General: Skin is warm and dry.      Capillary Refill: Capillary refill takes less than 2 seconds.   Neurological:      General: No focal deficit present.      Mental Status: He is alert and oriented to person, place, and time.   Psychiatric:         Mood and Affect: Mood normal.          Behavior: Behavior normal.             Significant Labs: All pertinent labs within the past 24 hours have been reviewed.  Recent Lab Results         01/23/24  0540        Anion Gap 16       Aniso 1+       Bands 7       Baso # 0.03       Basophil % 0.5       BUN 9       BUN/CREAT RATIO 14       Calcium 9.4       Chloride 101       CO2 24       Creatinine 0.63       Differential Method Manual       eGFR 130       Eos # 0.11       Eosinophil % 1.7        2       Glucose 145       Hematocrit 38.2       Hemoglobin 11.8       Lymph # 1.78       Lymph % 27.5        32       MCH 28.4       MCHC 30.9       MCV 91.8       Mono # 0.61       Mono % 9.4        6       MPV 10.7       Neutrophils, Abs 3.95       Neutrophils Relative 60.9       nRBC       Platelet Count 420       Poikilocytosis 1+       Potassium 3.8       RBC 4.16       RDW 18.5       Segmented Neutrophils, Man % 53       Sodium 137       Target Cells 1+       WBC 6.48               Significant Imaging: I have reviewed all pertinent imaging results/findings within the past 24 hours.

## 2024-01-23 NOTE — PROGRESS NOTES
[facility]  Hospital Medicine  Progress Note    Patient Name: Blue Abdul  MRN: [id number]  Patient Class: IP- Swing   Admission Date: 11/9/2023  Length of Stay: 75 days  Attending Physician: Ramiro Flores MD  Primary Care Provider: Nati Doyle FNP        Subjective:     Principal Problem:Acute hypercapnic respiratory failure        HPI:  33 y-old AAM with PMH of Trisomy 21, asthma, DM, GERD, and LILLIE. Patient presented to an outside hospital system on 09/08/2023 for behavioral issues r/t medication changes. While at the hospital he experienced a hypoglycemic episode with aspiration and was coded. Patient developed new onset seizure at that time with MRI showing cerebral edema. Patient experienced difficulty with vent weaning hattie trach was placed on 09/27/2023, PEG tube placed on 10/05/2023. Patient is admitted to [facility] for continued vent weaning, PT/OT eval and treatment.     Overview/Hospital Course:  No notes on file    Interval History:  Patient without complaints    Review of Systems  Objective:     Vital Signs (Most Recent):  Temp: 99 °F (37.2 °C) (01/23/24 0737)  Pulse: 96 (01/23/24 0755)  Resp: (!) 23 (01/23/24 0755)  BP: 116/65 (01/23/24 0737)  SpO2: 97 % (01/23/24 0755) Vital Signs (24h Range):  Temp:  [98.6 °F (37 °C)-99.4 °F (37.4 °C)] 99 °F (37.2 °C)  Pulse:  [] 96  Resp:  [19-29] 23  SpO2:  [90 %-98 %] 97 %  BP: ()/(52-71) 116/65     Weight: 87.5 kg (192 lb 14.4 oz)  Body mass index is 38.96 kg/m².    Intake/Output Summary (Last 24 hours) at 1/23/2024 1100  Last data filed at 1/23/2024 0931  Gross per 24 hour   Intake 2220 ml   Output --   Net 2220 ml         Physical Exam  Vitals reviewed.   Constitutional:       Appearance: Normal appearance.      Interventions: He is not intubated.  HENT:      Head: Normocephalic and atraumatic.      Nose: Nose normal.      Mouth/Throat:      Mouth: Mucous membranes are dry.      Pharynx:  Oropharynx is clear.   Eyes:      Extraocular Movements: Extraocular movements intact.      Conjunctiva/sclera: Conjunctivae normal.      Pupils: Pupils are equal, round, and reactive to light.   Cardiovascular:      Rate and Rhythm: Normal rate.      Heart sounds: Normal heart sounds. No murmur heard.  Pulmonary:      Effort: Pulmonary effort is normal. He is not intubated.      Breath sounds: Normal breath sounds.   Abdominal:      General: Abdomen is flat. Bowel sounds are normal.      Palpations: Abdomen is soft.   Musculoskeletal:         General: Normal range of motion.      Cervical back: Normal range of motion and neck supple.      Right lower leg: No edema.      Left lower leg: No edema.   Skin:     General: Skin is warm and dry.      Capillary Refill: Capillary refill takes less than 2 seconds.   Neurological:      General: No focal deficit present.      Mental Status: He is alert and oriented to person, place, and time.   Psychiatric:         Mood and Affect: Mood normal.         Behavior: Behavior normal.             Significant Labs: All pertinent labs within the past 24 hours have been reviewed.  Recent Lab Results         01/23/24  0540        Anion Gap 16       Aniso 1+       Bands 7       Baso # 0.03       Basophil % 0.5       BUN 9       BUN/CREAT RATIO 14       Calcium 9.4       Chloride 101       CO2 24       Creatinine 0.63       Differential Method Manual       eGFR 130       Eos # 0.11       Eosinophil % 1.7        2       Glucose 145       Hematocrit 38.2       Hemoglobin 11.8       Lymph # 1.78       Lymph % 27.5        32       MCH 28.4       MCHC 30.9       MCV 91.8       Mono # 0.61       Mono % 9.4        6       MPV 10.7       Neutrophils, Abs 3.95       Neutrophils Relative 60.9       nRBC       Platelet Count 420       Poikilocytosis 1+       Potassium 3.8       RBC 4.16       RDW 18.5       Segmented Neutrophils, Man % 53       Sodium 137       Target Cells 1+       WBC 6.48                Significant Imaging: I have reviewed all pertinent imaging results/findings within the past 24 hours.    Assessment/Plan:      * Acute hypercapnic respiratory failure  Patient not tolerating his trach being plugged or [place] valve he will need go home with tracheostomy.  Mother wean taught how to take of it    Encephalopathy, metabolic  Seems to be improving    Muscle weakness   PT and OT      Seizures  MRI showed encephalopathy  Keppra  Ativan PRN      Diabetes mellitus  Currently good control with sliding scale      VTE Risk Mitigation (From admission, onward)           Ordered     apixaban tablet 5 mg  2 times daily         11/09/23 2008     IP VTE HIGH RISK PATIENT  Once         11/09/23 1641     Place NORMA hose  Until discontinued         11/09/23 1641     Place sequential compression device  Until discontinued         11/09/23 1641                  Patient has a diagnosis dysphagia needs to feedings because of his tracheostomy is unable to swallow he will need to feedings long-term possibly for ever  Discharge Planning   CRICKET:      Code Status: Full Code   Is the patient medically ready for discharge?:     Reason for patient still in hospital (select all that apply): Patient trending condition, Laboratory test, Treatment, and Imaging  Discharge Plan A: Home with family, Home Health   Discharge Delays: None known at this time              Ramiro Flores MD  Department of Hospital Medicine   [facility]

## 2024-01-24 LAB — GLUCOSE SERPL-MCNC: 139 MG/DL (ref 70–105)

## 2024-01-24 PROCEDURE — 11000004 HC SNF PRIVATE

## 2024-01-24 PROCEDURE — 94640 AIRWAY INHALATION TREATMENT: CPT

## 2024-01-24 PROCEDURE — 25000003 PHARM REV CODE 250: Performed by: REGISTERED NURSE

## 2024-01-24 PROCEDURE — 82962 GLUCOSE BLOOD TEST: CPT

## 2024-01-24 PROCEDURE — 94761 N-INVAS EAR/PLS OXIMETRY MLT: CPT

## 2024-01-24 PROCEDURE — 99900026 HC AIRWAY MAINTENANCE (STAT)

## 2024-01-24 PROCEDURE — 63600175 PHARM REV CODE 636 W HCPCS: Performed by: INTERNAL MEDICINE

## 2024-01-24 PROCEDURE — 25000003 PHARM REV CODE 250: Performed by: INTERNAL MEDICINE

## 2024-01-24 PROCEDURE — 27000221 HC OXYGEN, UP TO 24 HOURS

## 2024-01-24 PROCEDURE — 99900035 HC TECH TIME PER 15 MIN (STAT)

## 2024-01-24 PROCEDURE — 27000941

## 2024-01-24 PROCEDURE — 99900031 HC PATIENT EDUCATION (STAT)

## 2024-01-24 PROCEDURE — 25000242 PHARM REV CODE 250 ALT 637 W/ HCPCS: Performed by: INTERNAL MEDICINE

## 2024-01-24 PROCEDURE — 27000935

## 2024-01-24 RX ADMIN — BUDESONIDE 0.5 MG: 0.5 INHALANT ORAL at 07:01

## 2024-01-24 RX ADMIN — POTASSIUM IODIDE 5 DROP: 1 SOLUTION ORAL at 08:01

## 2024-01-24 RX ADMIN — POTASSIUM IODIDE 5 DROP: 1 SOLUTION ORAL at 05:01

## 2024-01-24 RX ADMIN — POTASSIUM IODIDE 5 DROP: 1 SOLUTION ORAL at 01:01

## 2024-01-24 RX ADMIN — PREDNISONE 10 MG: 10 TABLET ORAL at 08:01

## 2024-01-24 RX ADMIN — APIXABAN 5 MG: 2.5 TABLET, FILM COATED ORAL at 08:01

## 2024-01-24 RX ADMIN — IPRATROPIUM BROMIDE AND ALBUTEROL SULFATE 3 ML: 2.5; .5 SOLUTION RESPIRATORY (INHALATION) at 07:01

## 2024-01-24 RX ADMIN — PANTOPRAZOLE SODIUM 40 MG: 40 GRANULE, DELAYED RELEASE ORAL at 08:01

## 2024-01-24 RX ADMIN — LEVETIRACETAM 500 MG: 500 SOLUTION ORAL at 08:01

## 2024-01-24 NOTE — CARE UPDATE
Placed pt on room air X 1 hour for home 02    Mother suctioned pt/ mother also taught how to use ambu bag properly    Resp.will monitor pt    02-93%

## 2024-01-24 NOTE — PLAN OF CARE
Problem: Communication Impairment (Artificial Airway)  Goal: Effective Communication  Outcome: Ongoing, Progressing     Problem: Device-Related Complication Risk (Artificial Airway)  Goal: Optimal Device Function  Outcome: Ongoing, Progressing     Problem: Gas Exchange Impaired  Goal: Optimal Gas Exchange  Outcome: Ongoing, Progressing     Problem: Breathing Pattern Ineffective  Goal: Effective Breathing Pattern  Outcome: Ongoing, Progressing

## 2024-01-24 NOTE — CARE UPDATE
[facility]  Discharge Reassessment    Primary Care Provider: Nati Doyle FNP    Expected Discharge Date:     Reassessment (most recent)       Discharge Reassessment - 01/24/24 1548          Discharge Reassessment    Assessment Type Discharge Planning Brief Assessment     Did the patient's condition or plan change since previous assessment? No     Discharge Plan discussed with: Parent(s)     Name(s) and Number(s) Linn     Communicated CRICKET with patient/caregiver Yes     Discharge Plan A Home with family;Home Health     DME Needed Upon Discharge  hospital bed;nutrition supplies;oxygen;respiratory supplies;suction machine     Transition of Care Barriers None     Why the patient remains in the hospital Other (see comment)   home       Post-Acute Status    Post-Acute Authorization Home Health     Coverage Medicare     Patient choice form signed by patient/caregiver List with quality metrics by geographic area provided     Discharge Delays Patient/Caregiver Education Not Complete                   Setting up with Linn  pt's mother home equipment and sending out referrals and request and orders for feedings and DME. Will plan for discharge next week .Angie at [facility] verified order and documentation and Lois Mars verified order , waiting on getting [address] for home  oxygen plan discharge next week

## 2024-01-24 NOTE — CARE UPDATE
Pt mother completed trach care and suction    Resp.therapist present to observe    Pt mother did well    02-93% on TC @28%

## 2024-01-24 NOTE — CARE UPDATE
[facility] - Swing Bed   Interdisciplinary Team Meeting    Patient: Blue Abdul   Today's Date: 1/24/2024   Estimated D/C Date:         Physician: Ramiro Flores MD Nurse Practitioner:  hernando   Pharmacy:  [facility]  Unit Director: Holly Carrillo   : Anita Krause Physical/Occupational Therapy: Jadon Mark   Speech Therapy:  Monica Anderson Activity Therapy: hernando   Nursing: Holly Carrillo  Respiratory: Tona Beckwith Dietary: Kuldeep Boggs  Other: na     Nurse  New Symptoms/Problems: na  Last Bowel Movement: 01/23/24   Urine: incontinent  Rojas: No  Bowel: incontinent   Constipated: No  Diarrhea: No   Isolation: No  Wound Care: No  Wound Location/Tx: na  Cognition: patient unresponsive  Aspiration Precautions: Yes  Comment(s): na    Respiratory   O2 Device: Trach Collar  O2 Flow: 28  SpO2: 97%  Neb Tx: No  Comment(s): na     Dietary  Nutrition:  Isosource 1.5 QID flush 125 cc ACHS  Comment(s): Mom being educated on TF and Peg care    Speech Therapy  Speech/Swallowing: No current speech or swallowing issues  Comment(s): unresponsive    Physical Therapy  Gait/Assistive Device: 0 ELOS: Plan to DC     Transfers: Activity did not occur  Bed Mobility: Total Assistance Range of Motion/Restrictions: na  Comment(s): hernando     Occupational Therapy  Eating/Grooming: Total Assistance Toileting: Total Assistance   Bathing: Total Assistance Dressing (Upper Body): Total Assistance   Dressing (Lower Body): Total Assistance Comment(s): hernando     Pharmacy  Medication Changes (see all MD orders in chart): No  Labs Reviewed: Yes  New Lab Orders: No  Comment(s): hernando      Tx Plan/Recommendations reviewed with family and/or patient on (date) daily .  Additional family Conference/Training: na  D/C Plan/Recommendations: Home with family  CRICKET:   Comment(s): and HH

## 2024-01-24 NOTE — CARE UPDATE
Placed pt on room air X 1 hour    For home 02 will continue to monitor    02-94%    HR-70    RR-18

## 2024-01-25 LAB — GLUCOSE SERPL-MCNC: 144 MG/DL (ref 70–105)

## 2024-01-25 PROCEDURE — 99900026 HC AIRWAY MAINTENANCE (STAT)

## 2024-01-25 PROCEDURE — 27000221 HC OXYGEN, UP TO 24 HOURS

## 2024-01-25 PROCEDURE — 25000003 PHARM REV CODE 250: Performed by: INTERNAL MEDICINE

## 2024-01-25 PROCEDURE — 11000004 HC SNF PRIVATE

## 2024-01-25 PROCEDURE — 94761 N-INVAS EAR/PLS OXIMETRY MLT: CPT

## 2024-01-25 PROCEDURE — 99900031 HC PATIENT EDUCATION (STAT)

## 2024-01-25 PROCEDURE — 25000003 PHARM REV CODE 250: Performed by: REGISTERED NURSE

## 2024-01-25 PROCEDURE — 27000958

## 2024-01-25 PROCEDURE — 82962 GLUCOSE BLOOD TEST: CPT

## 2024-01-25 PROCEDURE — 99900035 HC TECH TIME PER 15 MIN (STAT)

## 2024-01-25 PROCEDURE — 25000242 PHARM REV CODE 250 ALT 637 W/ HCPCS: Performed by: INTERNAL MEDICINE

## 2024-01-25 PROCEDURE — 27000935

## 2024-01-25 PROCEDURE — 94640 AIRWAY INHALATION TREATMENT: CPT

## 2024-01-25 PROCEDURE — 63600175 PHARM REV CODE 636 W HCPCS: Performed by: INTERNAL MEDICINE

## 2024-01-25 RX ADMIN — POTASSIUM IODIDE 5 DROP: 1 SOLUTION ORAL at 08:01

## 2024-01-25 RX ADMIN — APIXABAN 5 MG: 2.5 TABLET, FILM COATED ORAL at 08:01

## 2024-01-25 RX ADMIN — LEVETIRACETAM 500 MG: 500 SOLUTION ORAL at 08:01

## 2024-01-25 RX ADMIN — PANTOPRAZOLE SODIUM 40 MG: 40 GRANULE, DELAYED RELEASE ORAL at 08:01

## 2024-01-25 RX ADMIN — POTASSIUM IODIDE 5 DROP: 1 SOLUTION ORAL at 01:01

## 2024-01-25 RX ADMIN — BUDESONIDE 0.5 MG: 0.5 INHALANT ORAL at 07:01

## 2024-01-25 RX ADMIN — PREDNISONE 10 MG: 10 TABLET ORAL at 08:01

## 2024-01-25 RX ADMIN — POTASSIUM IODIDE 5 DROP: 1 SOLUTION ORAL at 04:01

## 2024-01-25 RX ADMIN — IPRATROPIUM BROMIDE AND ALBUTEROL SULFATE 3 ML: 2.5; .5 SOLUTION RESPIRATORY (INHALATION) at 07:01

## 2024-01-25 NOTE — RESPIRATORY THERAPY
Pt. Mother did Trach Care - watched mother do TC and did  Satisfactory.  Will continue to  monitor doing care. Mother is a bit slow in doing procedure.

## 2024-01-26 LAB — GLUCOSE SERPL-MCNC: 155 MG/DL (ref 70–105)

## 2024-01-26 PROCEDURE — 25000242 PHARM REV CODE 250 ALT 637 W/ HCPCS: Performed by: INTERNAL MEDICINE

## 2024-01-26 PROCEDURE — 82962 GLUCOSE BLOOD TEST: CPT

## 2024-01-26 PROCEDURE — 11000004 HC SNF PRIVATE

## 2024-01-26 PROCEDURE — 99900031 HC PATIENT EDUCATION (STAT)

## 2024-01-26 PROCEDURE — 94761 N-INVAS EAR/PLS OXIMETRY MLT: CPT

## 2024-01-26 PROCEDURE — 99900026 HC AIRWAY MAINTENANCE (STAT)

## 2024-01-26 PROCEDURE — 63600175 PHARM REV CODE 636 W HCPCS: Performed by: INTERNAL MEDICINE

## 2024-01-26 PROCEDURE — 27000221 HC OXYGEN, UP TO 24 HOURS

## 2024-01-26 PROCEDURE — 25000003 PHARM REV CODE 250: Performed by: REGISTERED NURSE

## 2024-01-26 PROCEDURE — 99900035 HC TECH TIME PER 15 MIN (STAT)

## 2024-01-26 PROCEDURE — 94640 AIRWAY INHALATION TREATMENT: CPT

## 2024-01-26 PROCEDURE — 25000003 PHARM REV CODE 250: Performed by: INTERNAL MEDICINE

## 2024-01-26 RX ADMIN — PANTOPRAZOLE SODIUM 40 MG: 40 GRANULE, DELAYED RELEASE ORAL at 09:01

## 2024-01-26 RX ADMIN — BUDESONIDE 0.5 MG: 0.5 INHALANT ORAL at 07:01

## 2024-01-26 RX ADMIN — IPRATROPIUM BROMIDE AND ALBUTEROL SULFATE 3 ML: 2.5; .5 SOLUTION RESPIRATORY (INHALATION) at 07:01

## 2024-01-26 RX ADMIN — APIXABAN 5 MG: 2.5 TABLET, FILM COATED ORAL at 08:01

## 2024-01-26 RX ADMIN — PREDNISONE 10 MG: 10 TABLET ORAL at 09:01

## 2024-01-26 RX ADMIN — POTASSIUM IODIDE 5 DROP: 1 SOLUTION ORAL at 05:01

## 2024-01-26 RX ADMIN — POTASSIUM IODIDE 5 DROP: 1 SOLUTION ORAL at 08:01

## 2024-01-26 RX ADMIN — PANTOPRAZOLE SODIUM 40 MG: 40 GRANULE, DELAYED RELEASE ORAL at 08:01

## 2024-01-26 RX ADMIN — POTASSIUM IODIDE 5 DROP: 1 SOLUTION ORAL at 09:01

## 2024-01-26 RX ADMIN — BUDESONIDE 0.5 MG: 0.5 INHALANT ORAL at 08:01

## 2024-01-26 RX ADMIN — LEVETIRACETAM 500 MG: 500 SOLUTION ORAL at 09:01

## 2024-01-26 RX ADMIN — POTASSIUM IODIDE 5 DROP: 1 SOLUTION ORAL at 12:01

## 2024-01-26 RX ADMIN — LEVETIRACETAM 500 MG: 500 SOLUTION ORAL at 08:01

## 2024-01-26 RX ADMIN — APIXABAN 5 MG: 2.5 TABLET, FILM COATED ORAL at 09:01

## 2024-01-26 NOTE — RESPIRATORY THERAPY
Trach care and suctioning done by mother with Respiratory at bedside.  Mother has good understanding of trach care.

## 2024-01-26 NOTE — PROGRESS NOTES
Wt: 196#  No change. For D/C home soon.  Pt's mother has been training to do Bolus Tfs.  No new lab. Last BM 1/26.  No problems noted with TF.  Continue POC.

## 2024-01-26 NOTE — RESPIRATORY THERAPY
Took Pt off O2 to see if Pt qualifies for home O2.  Sats dropped to 88% on roomair.  Placed pt back on 02 40% trach collar.  Sats came up to 94% on 40%

## 2024-01-27 LAB — GLUCOSE SERPL-MCNC: 142 MG/DL (ref 70–105)

## 2024-01-27 PROCEDURE — 99900026 HC AIRWAY MAINTENANCE (STAT)

## 2024-01-27 PROCEDURE — 27000221 HC OXYGEN, UP TO 24 HOURS

## 2024-01-27 PROCEDURE — 94640 AIRWAY INHALATION TREATMENT: CPT

## 2024-01-27 PROCEDURE — 99900035 HC TECH TIME PER 15 MIN (STAT)

## 2024-01-27 PROCEDURE — 94761 N-INVAS EAR/PLS OXIMETRY MLT: CPT

## 2024-01-27 PROCEDURE — 27000941

## 2024-01-27 PROCEDURE — 82962 GLUCOSE BLOOD TEST: CPT

## 2024-01-27 PROCEDURE — 27200966 HC CLOSED SUCTION SYSTEM

## 2024-01-27 PROCEDURE — 25000003 PHARM REV CODE 250: Performed by: INTERNAL MEDICINE

## 2024-01-27 PROCEDURE — 11000004 HC SNF PRIVATE

## 2024-01-27 PROCEDURE — 25000003 PHARM REV CODE 250: Performed by: REGISTERED NURSE

## 2024-01-27 PROCEDURE — 63600175 PHARM REV CODE 636 W HCPCS: Performed by: INTERNAL MEDICINE

## 2024-01-27 PROCEDURE — 27000935

## 2024-01-27 PROCEDURE — 25000242 PHARM REV CODE 250 ALT 637 W/ HCPCS: Performed by: INTERNAL MEDICINE

## 2024-01-27 RX ADMIN — APIXABAN 5 MG: 2.5 TABLET, FILM COATED ORAL at 08:01

## 2024-01-27 RX ADMIN — BUDESONIDE 0.5 MG: 0.5 INHALANT ORAL at 07:01

## 2024-01-27 RX ADMIN — IPRATROPIUM BROMIDE AND ALBUTEROL SULFATE 3 ML: 2.5; .5 SOLUTION RESPIRATORY (INHALATION) at 07:01

## 2024-01-27 RX ADMIN — PANTOPRAZOLE SODIUM 40 MG: 40 GRANULE, DELAYED RELEASE ORAL at 08:01

## 2024-01-27 RX ADMIN — PREDNISONE 10 MG: 10 TABLET ORAL at 08:01

## 2024-01-27 RX ADMIN — POLYETHYLENE GLYCOL 3350 17 G: 17 POWDER, FOR SOLUTION ORAL at 08:01

## 2024-01-27 RX ADMIN — LEVETIRACETAM 500 MG: 500 SOLUTION ORAL at 08:01

## 2024-01-27 RX ADMIN — POTASSIUM IODIDE 5 DROP: 1 SOLUTION ORAL at 08:01

## 2024-01-27 RX ADMIN — POTASSIUM IODIDE 5 DROP: 1 SOLUTION ORAL at 01:01

## 2024-01-27 RX ADMIN — POTASSIUM IODIDE 5 DROP: 1 SOLUTION ORAL at 05:01

## 2024-01-28 LAB — GLUCOSE SERPL-MCNC: 132 MG/DL (ref 70–105)

## 2024-01-28 PROCEDURE — 25000003 PHARM REV CODE 250: Performed by: INTERNAL MEDICINE

## 2024-01-28 PROCEDURE — 27000935

## 2024-01-28 PROCEDURE — 94640 AIRWAY INHALATION TREATMENT: CPT

## 2024-01-28 PROCEDURE — 25000003 PHARM REV CODE 250: Performed by: REGISTERED NURSE

## 2024-01-28 PROCEDURE — 63600175 PHARM REV CODE 636 W HCPCS: Performed by: INTERNAL MEDICINE

## 2024-01-28 PROCEDURE — 99900031 HC PATIENT EDUCATION (STAT)

## 2024-01-28 PROCEDURE — 94761 N-INVAS EAR/PLS OXIMETRY MLT: CPT

## 2024-01-28 PROCEDURE — 27200966 HC CLOSED SUCTION SYSTEM

## 2024-01-28 PROCEDURE — 25000242 PHARM REV CODE 250 ALT 637 W/ HCPCS: Performed by: INTERNAL MEDICINE

## 2024-01-28 PROCEDURE — 99900035 HC TECH TIME PER 15 MIN (STAT)

## 2024-01-28 PROCEDURE — 82962 GLUCOSE BLOOD TEST: CPT

## 2024-01-28 PROCEDURE — 99900026 HC AIRWAY MAINTENANCE (STAT)

## 2024-01-28 PROCEDURE — 11000004 HC SNF PRIVATE

## 2024-01-28 PROCEDURE — 27000221 HC OXYGEN, UP TO 24 HOURS

## 2024-01-28 PROCEDURE — 27000941

## 2024-01-28 RX ADMIN — POTASSIUM IODIDE 5 DROP: 1 SOLUTION ORAL at 05:01

## 2024-01-28 RX ADMIN — APIXABAN 5 MG: 2.5 TABLET, FILM COATED ORAL at 08:01

## 2024-01-28 RX ADMIN — LEVETIRACETAM 500 MG: 500 SOLUTION ORAL at 08:01

## 2024-01-28 RX ADMIN — PANTOPRAZOLE SODIUM 40 MG: 40 GRANULE, DELAYED RELEASE ORAL at 08:01

## 2024-01-28 RX ADMIN — BUDESONIDE 0.5 MG: 0.5 INHALANT ORAL at 07:01

## 2024-01-28 RX ADMIN — POTASSIUM IODIDE 5 DROP: 1 SOLUTION ORAL at 01:01

## 2024-01-28 RX ADMIN — POTASSIUM IODIDE 5 DROP: 1 SOLUTION ORAL at 09:01

## 2024-01-28 RX ADMIN — IPRATROPIUM BROMIDE AND ALBUTEROL SULFATE 3 ML: 2.5; .5 SOLUTION RESPIRATORY (INHALATION) at 07:01

## 2024-01-28 RX ADMIN — LEVETIRACETAM 500 MG: 500 SOLUTION ORAL at 09:01

## 2024-01-28 RX ADMIN — PANTOPRAZOLE SODIUM 40 MG: 40 GRANULE, DELAYED RELEASE ORAL at 09:01

## 2024-01-28 RX ADMIN — APIXABAN 5 MG: 2.5 TABLET, FILM COATED ORAL at 09:01

## 2024-01-28 RX ADMIN — PREDNISONE 10 MG: 10 TABLET ORAL at 09:01

## 2024-01-28 RX ADMIN — POTASSIUM IODIDE 5 DROP: 1 SOLUTION ORAL at 08:01

## 2024-01-29 LAB — GLUCOSE SERPL-MCNC: 169 MG/DL (ref 70–105)

## 2024-01-29 PROCEDURE — 94760 N-INVAS EAR/PLS OXIMETRY 1: CPT

## 2024-01-29 PROCEDURE — 11000004 HC SNF PRIVATE

## 2024-01-29 PROCEDURE — 82962 GLUCOSE BLOOD TEST: CPT

## 2024-01-29 PROCEDURE — 25000003 PHARM REV CODE 250: Performed by: REGISTERED NURSE

## 2024-01-29 PROCEDURE — 94640 AIRWAY INHALATION TREATMENT: CPT

## 2024-01-29 PROCEDURE — 99900026 HC AIRWAY MAINTENANCE (STAT)

## 2024-01-29 PROCEDURE — 94761 N-INVAS EAR/PLS OXIMETRY MLT: CPT

## 2024-01-29 PROCEDURE — 27000935

## 2024-01-29 PROCEDURE — 63600175 PHARM REV CODE 636 W HCPCS: Performed by: INTERNAL MEDICINE

## 2024-01-29 PROCEDURE — 25000003 PHARM REV CODE 250: Performed by: INTERNAL MEDICINE

## 2024-01-29 PROCEDURE — 99900035 HC TECH TIME PER 15 MIN (STAT)

## 2024-01-29 PROCEDURE — 27000941

## 2024-01-29 PROCEDURE — 27000221 HC OXYGEN, UP TO 24 HOURS

## 2024-01-29 PROCEDURE — 25000242 PHARM REV CODE 250 ALT 637 W/ HCPCS: Performed by: INTERNAL MEDICINE

## 2024-01-29 RX ADMIN — APIXABAN 5 MG: 2.5 TABLET, FILM COATED ORAL at 09:01

## 2024-01-29 RX ADMIN — POTASSIUM IODIDE 5 DROP: 1 SOLUTION ORAL at 05:01

## 2024-01-29 RX ADMIN — APIXABAN 5 MG: 2.5 TABLET, FILM COATED ORAL at 08:01

## 2024-01-29 RX ADMIN — IPRATROPIUM BROMIDE AND ALBUTEROL SULFATE 3 ML: 2.5; .5 SOLUTION RESPIRATORY (INHALATION) at 07:01

## 2024-01-29 RX ADMIN — POTASSIUM IODIDE 5 DROP: 1 SOLUTION ORAL at 09:01

## 2024-01-29 RX ADMIN — PANTOPRAZOLE SODIUM 40 MG: 40 GRANULE, DELAYED RELEASE ORAL at 08:01

## 2024-01-29 RX ADMIN — LEVETIRACETAM 500 MG: 500 SOLUTION ORAL at 09:01

## 2024-01-29 RX ADMIN — POTASSIUM IODIDE 5 DROP: 1 SOLUTION ORAL at 01:01

## 2024-01-29 RX ADMIN — PREDNISONE 10 MG: 10 TABLET ORAL at 08:01

## 2024-01-29 RX ADMIN — PANTOPRAZOLE SODIUM 40 MG: 40 GRANULE, DELAYED RELEASE ORAL at 09:01

## 2024-01-29 RX ADMIN — BUDESONIDE 0.5 MG: 0.5 INHALANT ORAL at 07:01

## 2024-01-29 NOTE — PLAN OF CARE
Weekly Swing Bed Note    Patient' s sat on trach collar 35% this morning was 98%.  BBS coarse.  Patients Mother is doing the trach care and suctioning patient.  She will take him home.

## 2024-01-29 NOTE — RESPIRATORY THERAPY
Took o2 off pt to qualify for home o2. Pts o2 was 95% before being taken off. After about 5 minutes, pts spo2 dropped down to 88%. After placing o2 back on pt, pts spo2 increased back to 94%. Pt qualifies for home o2.

## 2024-01-30 LAB
ANION GAP SERPL CALCULATED.3IONS-SCNC: 18 MMOL/L (ref 7–16)
BASOPHILS # BLD AUTO: 0.03 K/UL (ref 0–0.2)
BASOPHILS NFR BLD AUTO: 0.5 % (ref 0–1)
BUN SERPL-MCNC: 10 MG/DL (ref 7–18)
BUN/CREAT SERPL: 14 (ref 6–20)
CALCIUM SERPL-MCNC: 9.5 MG/DL (ref 8.5–10.1)
CHLORIDE SERPL-SCNC: 102 MMOL/L (ref 98–107)
CO2 SERPL-SCNC: 23 MMOL/L (ref 21–32)
CREAT SERPL-MCNC: 0.7 MG/DL (ref 0.7–1.3)
DIFFERENTIAL METHOD BLD: ABNORMAL
EGFR (NO RACE VARIABLE) (RUSH/TITUS): 126 ML/MIN/1.73M2
EOSINOPHIL # BLD AUTO: 0.15 K/UL (ref 0–0.5)
EOSINOPHIL NFR BLD AUTO: 2.6 % (ref 1–4)
ERYTHROCYTE [DISTWIDTH] IN BLOOD BY AUTOMATED COUNT: 18.3 % (ref 11.5–14.5)
GLUCOSE SERPL-MCNC: 125 MG/DL (ref 74–106)
GLUCOSE SERPL-MCNC: 134 MG/DL (ref 70–105)
HCT VFR BLD AUTO: 42.4 % (ref 40–54)
HGB BLD-MCNC: 13.1 G/DL (ref 13.5–18)
LYMPHOCYTES # BLD AUTO: 1.43 K/UL (ref 1–4.8)
LYMPHOCYTES NFR BLD AUTO: 24.5 % (ref 27–41)
MCH RBC QN AUTO: 28.4 PG (ref 27–31)
MCHC RBC AUTO-ENTMCNC: 30.9 G/DL (ref 32–36)
MCV RBC AUTO: 91.8 FL (ref 80–96)
MONOCYTES # BLD AUTO: 0.45 K/UL (ref 0–0.8)
MONOCYTES NFR BLD AUTO: 7.7 % (ref 2–6)
MPC BLD CALC-MCNC: 10.5 FL (ref 9.4–12.4)
NEUTROPHILS # BLD AUTO: 3.78 K/UL (ref 1.8–7.7)
NEUTROPHILS NFR BLD AUTO: 64.7 % (ref 53–65)
PLATELET # BLD AUTO: 420 K/UL (ref 150–400)
POTASSIUM SERPL-SCNC: 4.3 MMOL/L (ref 3.5–5.1)
RBC # BLD AUTO: 4.62 M/UL (ref 4.6–6.2)
SODIUM SERPL-SCNC: 139 MMOL/L (ref 136–145)
WBC # BLD AUTO: 5.84 K/UL (ref 4.5–11)

## 2024-01-30 PROCEDURE — 82962 GLUCOSE BLOOD TEST: CPT

## 2024-01-30 PROCEDURE — 25000003 PHARM REV CODE 250: Performed by: INTERNAL MEDICINE

## 2024-01-30 PROCEDURE — 11000004 HC SNF PRIVATE

## 2024-01-30 PROCEDURE — 85025 COMPLETE CBC W/AUTO DIFF WBC: CPT | Performed by: INTERNAL MEDICINE

## 2024-01-30 PROCEDURE — 63600175 PHARM REV CODE 636 W HCPCS: Performed by: INTERNAL MEDICINE

## 2024-01-30 PROCEDURE — 25000003 PHARM REV CODE 250: Performed by: REGISTERED NURSE

## 2024-01-30 PROCEDURE — 25000242 PHARM REV CODE 250 ALT 637 W/ HCPCS: Performed by: INTERNAL MEDICINE

## 2024-01-30 PROCEDURE — 27000946

## 2024-01-30 PROCEDURE — 94761 N-INVAS EAR/PLS OXIMETRY MLT: CPT

## 2024-01-30 PROCEDURE — 99316 NF DSCHRG MGMT 30 MIN+: CPT | Mod: ,,, | Performed by: INTERNAL MEDICINE

## 2024-01-30 PROCEDURE — 80048 BASIC METABOLIC PNL TOTAL CA: CPT | Performed by: INTERNAL MEDICINE

## 2024-01-30 PROCEDURE — 99900035 HC TECH TIME PER 15 MIN (STAT)

## 2024-01-30 PROCEDURE — 94640 AIRWAY INHALATION TREATMENT: CPT

## 2024-01-30 PROCEDURE — 27000221 HC OXYGEN, UP TO 24 HOURS

## 2024-01-30 PROCEDURE — 27000984 HC MATTRESS, AIR

## 2024-01-30 RX ORDER — LEVETIRACETAM 100 MG/ML
500 SOLUTION ORAL 2 TIMES DAILY
Qty: 300 ML | Refills: 5 | Status: SHIPPED | OUTPATIENT
Start: 2024-01-30

## 2024-01-30 RX ORDER — METOPROLOL TARTRATE 25 MG/1
25 TABLET, FILM COATED ORAL 2 TIMES DAILY
Qty: 60 TABLET | Refills: 5 | Status: SHIPPED | OUTPATIENT
Start: 2024-01-30

## 2024-01-30 RX ORDER — PREDNISONE 10 MG/1
5 TABLET ORAL DAILY
Qty: 20 TABLET | Refills: 0 | Status: SHIPPED | OUTPATIENT
Start: 2024-01-31

## 2024-01-30 RX ORDER — BUDESONIDE 0.5 MG/2ML
0.5 INHALANT ORAL EVERY 12 HOURS
Qty: 120 ML | Refills: 5 | Status: SHIPPED | OUTPATIENT
Start: 2024-01-30 | End: 2024-02-01

## 2024-01-30 RX ORDER — IPRATROPIUM BROMIDE AND ALBUTEROL SULFATE 2.5; .5 MG/3ML; MG/3ML
3 SOLUTION RESPIRATORY (INHALATION) EVERY 6 HOURS PRN
Qty: 75 ML | Refills: 0 | Status: SHIPPED | OUTPATIENT
Start: 2024-01-30 | End: 2024-01-31

## 2024-01-30 RX ORDER — POLYETHYLENE GLYCOL 3350 17 G/17G
17 POWDER, FOR SOLUTION ORAL DAILY PRN
Qty: 30 PACKET | Refills: 5 | Status: SHIPPED | OUTPATIENT
Start: 2024-01-30

## 2024-01-30 RX ORDER — IPRATROPIUM BROMIDE AND ALBUTEROL SULFATE 2.5; .5 MG/3ML; MG/3ML
3 SOLUTION RESPIRATORY (INHALATION) EVERY 6 HOURS
Qty: 75 ML | Refills: 0 | Status: SHIPPED | OUTPATIENT
Start: 2024-01-30

## 2024-01-30 RX ORDER — DICLOFENAC SODIUM 10 MG/G
2 GEL TOPICAL 3 TIMES DAILY PRN
Qty: 100 G | Refills: 5 | Status: SHIPPED | OUTPATIENT
Start: 2024-01-30

## 2024-01-30 RX ORDER — BUDESONIDE 0.5 MG/2ML
0.5 INHALANT ORAL DAILY
Qty: 60 ML | Refills: 2 | Status: SHIPPED | OUTPATIENT
Start: 2024-01-30 | End: 2024-04-29

## 2024-01-30 RX ADMIN — BUDESONIDE 0.5 MG: 0.5 INHALANT ORAL at 08:01

## 2024-01-30 RX ADMIN — POTASSIUM IODIDE 5 DROP: 1 SOLUTION ORAL at 01:01

## 2024-01-30 RX ADMIN — PANTOPRAZOLE SODIUM 40 MG: 40 GRANULE, DELAYED RELEASE ORAL at 08:01

## 2024-01-30 RX ADMIN — LEVETIRACETAM 500 MG: 500 SOLUTION ORAL at 09:01

## 2024-01-30 RX ADMIN — PANTOPRAZOLE SODIUM 40 MG: 40 GRANULE, DELAYED RELEASE ORAL at 09:01

## 2024-01-30 RX ADMIN — PREDNISONE 10 MG: 10 TABLET ORAL at 08:01

## 2024-01-30 RX ADMIN — LEVETIRACETAM 500 MG: 500 SOLUTION ORAL at 08:01

## 2024-01-30 RX ADMIN — APIXABAN 5 MG: 2.5 TABLET, FILM COATED ORAL at 09:01

## 2024-01-30 RX ADMIN — IPRATROPIUM BROMIDE AND ALBUTEROL SULFATE 3 ML: 2.5; .5 SOLUTION RESPIRATORY (INHALATION) at 08:01

## 2024-01-30 RX ADMIN — POTASSIUM IODIDE 5 DROP: 1 SOLUTION ORAL at 08:01

## 2024-01-30 RX ADMIN — APIXABAN 5 MG: 2.5 TABLET, FILM COATED ORAL at 08:01

## 2024-01-30 RX ADMIN — IPRATROPIUM BROMIDE AND ALBUTEROL SULFATE 3 ML: 2.5; .5 SOLUTION RESPIRATORY (INHALATION) at 07:01

## 2024-01-30 RX ADMIN — POTASSIUM IODIDE 5 DROP: 1 SOLUTION ORAL at 04:01

## 2024-01-30 RX ADMIN — POTASSIUM IODIDE 5 DROP: 1 SOLUTION ORAL at 09:01

## 2024-01-30 NOTE — RESPIRATORY THERAPY
Took trach collar off pt to qualify pt for home o2. Pts spo2 started at 91% and decreased to 87% within 5 minutes. Placed o2 back on pt at 35% and spo2 increased to 92%. Pt qualifies for home o2.

## 2024-01-30 NOTE — PLAN OF CARE
Problem: Aspiration (Enteral Nutrition)  Goal: Absence of Aspiration Signs and Symptoms  Outcome: Ongoing, Progressing     Problem: Feeding Intolerance (Enteral Nutrition)  Goal: Feeding Tolerance  Outcome: Ongoing, Progressing     Problem: Nutrition Impairment (Mechanical Ventilation, Invasive)  Goal: Optimal Nutrition Delivery  Outcome: Met

## 2024-01-30 NOTE — HOSPITAL COURSE
Patient was admitted to the hospital on 11 09/20/2023 was on the ventilator when he arrived we have been able wean from the ventilator he is on trach collar the biggest issue is that he could not tolerate having his trach removed and wheeze downsize him to a 6-1/2 cuff is ready for discharge home follow-up with his primary care was pits will continue with trach collar and receive care by his mother he will have tube feedings because he can not eat with the trachea he also needs a hospital bed to keep his bed at 45 degree for feeding for pain control.  I will be glad to see if needed respiratory complications.  Any trach issues be referred to Dr. Shady Tovar had [facility].

## 2024-01-30 NOTE — PROGRESS NOTES
Wt: 191#  RDN visited pt this a.m. and pt's mother denied questions re: TF.  Pt tolerating Bolus TF well.  Last BM , B-169, lab ok.  Goals met for D/C.

## 2024-01-30 NOTE — PLAN OF CARE
ALL orders and updates have been sent to [facility] and spoke with Angie today and we are ready for delivery of supplies and All orders and updates have been sent to Rajwinder at [facility]. Ready for planned discharge in am , Dr Flores has done discharge summary and transportation is scheduled for 0800 . Mother Linn is aware .

## 2024-01-30 NOTE — DISCHARGE SUMMARY
[facility]  Hospital Medicine  Discharge Summary      Patient Name: Blue Abdul  MRN: [id number]  [facility]: [id number]  Patient Class: IP- Swing  Admission Date: 11/9/2023  Hospital Length of Stay: 82 days  Discharge Date and Time:  01/30/2024 9:56 AM  Attending Physician: Ramiro Flores MD   Discharging Provider: Ramiro Flores MD  Primary Care Provider: Nati Doyle FNP    Primary Care Team: Networked reference to record PCT     HPI:   33 y-old AAM with PMH of Trisomy 21, asthma, DM, GERD, and LILLIE. Patient presented to an outside hospital system on 09/08/2023 for behavioral issues r/t medication changes. While at the hospital he experienced a hypoglycemic episode with aspiration and was coded. Patient developed new onset seizure at that time with MRI showing cerebral edema. Patient experienced difficulty with vent weaning hattie trach was placed on 09/27/2023, PEG tube placed on 10/05/2023. Patient is admitted to [facility] for continued vent weaning, PT/OT eval and treatment.     * No surgery found *      Hospital Course:   Patient was admitted to the hospital on 11 09/20/2023 was on the ventilator when he arrived we have been able wean from the ventilator he is on trach collar the biggest issue is that he could not tolerate having his trach removed and wheeze downsize him to a 6-1/2 cuff is ready for discharge home follow-up with his primary care was pits will continue with trach collar and receive care by his mother he will have tube feedings because he can not eat with the trachea he also needs a hospital bed to keep his bed at 45 degree for feeding for pain control.  I will be glad to see if needed respiratory complications.  Any trach issues be referred to Dr. Shady Tovar had [facility].     Goals of Care Treatment Preferences:  Code Status: Full Code      Consults:   Consults (From admission, onward)          Status Ordering  Provider     Inpatient consult to Registered Dietitian/Nutritionist  Once        Provider:  (Not yet assigned)    Completed ARCHANA RAMOS     IP consult to case management  Once        Provider:  (Not yet assigned)    Acknowledged ALEXIS RODRIGUES            No new Assessment & Plan notes have been filed under this hospital service since the last note was generated.  Service: Hospital Medicine    Final Active Diagnoses:    Diagnosis Date Noted POA    PRINCIPAL PROBLEM:  Acute hypercapnic respiratory failure [J96.02]  Yes    Encephalopathy, metabolic [G93.41] 11/15/2023 Yes    Muscle weakness [M62.81] 11/10/2023 Yes    Diabetes mellitus [E11.9]  Yes    Seizures [R56.9]  Yes      Problems Resolved During this Admission:       Discharged Condition: fair    Disposition: Home or Self Care    Follow Up:   Follow-up Information       Nati Doyle FNP Follow up in 2 week(s).    Specialty: Family Medicine  Why: cbc, bmp  Contact information:  [address] [place] [facility]  [place] [address] [phone]                           Patient Instructions:   No discharge procedures on file.    Significant Diagnostic Studies: Labs: A1C:   Recent Labs   Lab 11/09/23  1700   HGBA1C 6.4     Microbiology:  Not applicable  Radiology:  Noted  Endoscopy:  Not applicable    Pending Diagnostic Studies:       None           Medications:  Reconciled Home Medications:      Medication List        START taking these medications      budesonide 0.5 mg/2 mL nebulizer solution  Commonly known as: PULMICORT  Take 2 mLs (0.5 mg total) by nebulization every 12 (twelve) hours. Controller     diclofenac sodium 1 % Gel  Commonly known as: VOLTAREN  Apply 2 g topically 3 (three) times daily as needed (muscle pain).     polyethylene glycol 17 gram Pwpk  Commonly known as: GLYCOLAX  17 g by Per G Tube route daily as needed for Constipation.     predniSONE 10 MG tablet  Commonly known as: DELTASONE  0.5 tablets (5 mg total)  by Per G Tube route once daily.  Start taking on: January 31, 2024            CONTINUE taking these medications      albuterol-ipratropium 2.5 mg-0.5 mg/3 mL nebulizer solution  Commonly known as: DUO-NEB  Take 3 mLs by nebulization every 6 (six) hours. Rescue     apixaban 5 mg Tab  Commonly known as: ELIQUIS  1 tablet (5 mg total) by Per G Tube route 2 (two) times daily.     levETIRAcetam 100 mg/mL Soln  Commonly known as: KEPPRA  5 mLs (500 mg total) by Per G Tube route 2 (two) times daily.     metoprolol tartrate 25 MG tablet  Commonly known as: LOPRESSOR  1 tablet (25 mg total) by Per G Tube route 2 (two) times daily.     pantoprazole 40 mg suspension  Commonly known as: PROTONIX  40 mg by Per G Tube route once daily.              Indwelling Lines/Drains at time of discharge:   Lines/Drains/Airways       Drain  Duration                  Gastrostomy/Enterostomy midline -- days              Airway  Duration             Adult Surgical Airway 01/04/24 1300 [place] Extra Large Cuffed Distal 6.0/ 75mm 25 days                    Time spent on the discharge of patient: 35 minutes         Ramiro Flores MD  Department of Hospital Medicine  [facility]

## 2024-01-31 VITALS
OXYGEN SATURATION: 90 % | TEMPERATURE: 100 F | BODY MASS INDEX: 38.34 KG/M2 | HEIGHT: 60 IN | SYSTOLIC BLOOD PRESSURE: 113 MMHG | DIASTOLIC BLOOD PRESSURE: 61 MMHG | WEIGHT: 195.31 LBS | HEART RATE: 105 BPM | RESPIRATION RATE: 28 BRPM

## 2024-01-31 LAB — GLUCOSE SERPL-MCNC: 137 MG/DL (ref 70–105)

## 2024-01-31 PROCEDURE — 82962 GLUCOSE BLOOD TEST: CPT

## 2024-01-31 PROCEDURE — 25000242 PHARM REV CODE 250 ALT 637 W/ HCPCS: Performed by: INTERNAL MEDICINE

## 2024-01-31 PROCEDURE — 99900035 HC TECH TIME PER 15 MIN (STAT)

## 2024-01-31 PROCEDURE — 99900026 HC AIRWAY MAINTENANCE (STAT)

## 2024-01-31 PROCEDURE — 25000003 PHARM REV CODE 250: Performed by: INTERNAL MEDICINE

## 2024-01-31 PROCEDURE — 25000003 PHARM REV CODE 250: Performed by: REGISTERED NURSE

## 2024-01-31 PROCEDURE — 63600175 PHARM REV CODE 636 W HCPCS: Performed by: INTERNAL MEDICINE

## 2024-01-31 PROCEDURE — 94640 AIRWAY INHALATION TREATMENT: CPT

## 2024-01-31 PROCEDURE — 27000221 HC OXYGEN, UP TO 24 HOURS

## 2024-01-31 PROCEDURE — 94761 N-INVAS EAR/PLS OXIMETRY MLT: CPT

## 2024-01-31 PROCEDURE — 94760 N-INVAS EAR/PLS OXIMETRY 1: CPT

## 2024-01-31 RX ORDER — IPRATROPIUM BROMIDE AND ALBUTEROL SULFATE 2.5; .5 MG/3ML; MG/3ML
SOLUTION RESPIRATORY (INHALATION) EVERY 6 HOURS PRN
Qty: 90 ML | Refills: 0 | Status: SHIPPED | OUTPATIENT
Start: 2024-01-31

## 2024-01-31 RX ADMIN — PREDNISONE 10 MG: 10 TABLET ORAL at 08:01

## 2024-01-31 RX ADMIN — POTASSIUM IODIDE 5 DROP: 1 SOLUTION ORAL at 08:01

## 2024-01-31 RX ADMIN — BUDESONIDE 0.5 MG: 0.5 INHALANT ORAL at 07:01

## 2024-01-31 RX ADMIN — PANTOPRAZOLE SODIUM 40 MG: 40 GRANULE, DELAYED RELEASE ORAL at 08:01

## 2024-01-31 RX ADMIN — LEVETIRACETAM 500 MG: 500 SOLUTION ORAL at 08:01

## 2024-01-31 RX ADMIN — IPRATROPIUM BROMIDE AND ALBUTEROL SULFATE 3 ML: 2.5; .5 SOLUTION RESPIRATORY (INHALATION) at 07:01

## 2024-01-31 RX ADMIN — APIXABAN 5 MG: 2.5 TABLET, FILM COATED ORAL at 08:01

## 2024-01-31 NOTE — NURSING
Patient discharged home, [facility] ambulance service here to transport patient home. 30% O2 via trach collar in route. Feeding tube intact and clamped

## 2024-01-31 NOTE — PLAN OF CARE
Problem: Adult Inpatient Plan of Care  Goal: Plan of Care Review  Outcome: Met  Goal: Patient-Specific Goal (Individualized)  Outcome: Met  Goal: Absence of Hospital-Acquired Illness or Injury  Outcome: Met  Goal: Optimal Comfort and Wellbeing  Outcome: Met  Goal: Readiness for Transition of Care  Outcome: Met     Problem: Communication Impairment (Artificial Airway)  Goal: Effective Communication  Outcome: Met     Problem: Device-Related Complication Risk (Artificial Airway)  Goal: Optimal Device Function  Outcome: Met     Problem: Skin and Tissue Injury (Artificial Airway)  Goal: Absence of Device-Related Skin or Tissue Injury  Outcome: Met     Problem: Fall Injury Risk  Goal: Absence of Fall and Fall-Related Injury  Outcome: Met     Problem: Skin Injury Risk Increased  Goal: Skin Health and Integrity  Outcome: Met     Problem: Impaired Wound Healing  Goal: Optimal Wound Healing  Outcome: Met     Problem: Diabetes Comorbidity  Goal: Blood Glucose Level Within Targeted Range  Outcome: Met     Problem: Bariatric Environmental Safety  Goal: Safety Maintained with Care  Outcome: Met     Problem: Gas Exchange Impaired  Goal: Optimal Gas Exchange  Outcome: Met     Problem: Breathing Pattern Ineffective  Goal: Effective Breathing Pattern  Outcome: Met     Problem: Airway Clearance Ineffective  Goal: Effective Airway Clearance  Outcome: Met     Problem: Aspiration (Enteral Nutrition)  Goal: Absence of Aspiration Signs and Symptoms  Outcome: Met     Problem: Feeding Intolerance (Enteral Nutrition)  Goal: Feeding Tolerance  Outcome: Met

## 2024-01-31 NOTE — PLAN OF CARE
Problem: Communication Impairment (Artificial Airway)  Goal: Effective Communication  Outcome: Ongoing, Progressing     Problem: Device-Related Complication Risk (Artificial Airway)  Goal: Optimal Device Function  Outcome: Ongoing, Progressing     Problem: Gas Exchange Impaired  Goal: Optimal Gas Exchange  Outcome: Ongoing, Progressing     Problem: Airway Clearance Ineffective  Goal: Effective Airway Clearance  Outcome: Ongoing, Progressing

## 2024-01-31 NOTE — NURSING
Morning meds given per tube and discharge instructions reviewed with pt's mother verbally and a copy of the AVS given to her. Mother is knowlegable of the home medications, follow up appointments, daily care of ADL's, med administration, feeding administration, and care of the trach/ airway. Also aware of the s/s to report and return to the ER for.

## 2024-02-01 ENCOUNTER — PATIENT OUTREACH (OUTPATIENT)
Dept: ADMINISTRATIVE | Facility: CLINIC | Age: 34
End: 2024-02-01

## 2024-02-01 ENCOUNTER — TELEPHONE (OUTPATIENT)
Dept: PHARMACY | Facility: CLINIC | Age: 34
End: 2024-02-01

## 2024-02-01 DIAGNOSIS — J96.00 ACUTE RESPIRATORY FAILURE, UNSPECIFIED WHETHER WITH HYPOXIA OR HYPERCAPNIA: Primary | ICD-10-CM

## 2024-02-01 RX ORDER — BUDESONIDE 0.5 MG/2ML
0.5 INHALANT ORAL DAILY
Qty: 120 ML | Refills: 5 | Status: SHIPPED | OUTPATIENT
Start: 2024-02-01 | End: 2025-01-31

## 2024-02-01 NOTE — PROGRESS NOTES
Patient is unable to afford is budesonide nebulizer solution 0.5 mg/2ml every 12 hours.    Send in basket message to Dr. Flores regarding a cheaper medication.  Email sent to pharmacy assistance program at [facility].   Awaiting response.

## 2024-02-01 NOTE — PROGRESS NOTES
C3 nurse spoke with Blue Abdul  for a TCC post hospital discharge follow up call. The patient has a scheduled [facility] appointment with Nati Doyle FNP  on 2/14/2024 @ 100.

## 2024-12-01 NOTE — PROGRESS NOTES
Called and updated patient's daughter, Juliana. All questions answered.    Pt is on trach collar at 40% during the day and CPAP at night per mothers request. Pt is doing well on trach collar no issues.

## 2024-12-05 NOTE — PLAN OF CARE
Problem: Communication Impairment (Mechanical Ventilation, Invasive)  Goal: Effective Communication  Outcome: Ongoing, Progressing  Intervention: Ensure Effective Communication  Flowsheets (Taken 12/21/2023 1808)  Communication Enhancement Strategies:   call light answered in person   repetition utilized   nonverbal strategies used   family/caregiver assisted with communication   extra time allowed for response      stated